# Patient Record
Sex: FEMALE | Race: WHITE | NOT HISPANIC OR LATINO | Employment: OTHER | URBAN - METROPOLITAN AREA
[De-identification: names, ages, dates, MRNs, and addresses within clinical notes are randomized per-mention and may not be internally consistent; named-entity substitution may affect disease eponyms.]

---

## 2023-04-15 LAB
ALANINE AMINOTRANSFERASE (SGPT) (U/L) IN SER/PLAS: 15 U/L (ref 7–45)
ALBUMIN (G/DL) IN SER/PLAS: 4.1 G/DL (ref 3.4–5)
ALKALINE PHOSPHATASE (U/L) IN SER/PLAS: 111 U/L (ref 33–136)
ANION GAP IN SER/PLAS: 15 MMOL/L (ref 10–20)
ASPARTATE AMINOTRANSFERASE (SGOT) (U/L) IN SER/PLAS: 20 U/L (ref 9–39)
BILIRUBIN TOTAL (MG/DL) IN SER/PLAS: 0.6 MG/DL (ref 0–1.2)
CALCIDIOL (25 OH VITAMIN D3) (NG/ML) IN SER/PLAS: 17 NG/ML
CALCIUM (MG/DL) IN SER/PLAS: 9.8 MG/DL (ref 8.6–10.6)
CARBON DIOXIDE, TOTAL (MMOL/L) IN SER/PLAS: 25 MMOL/L (ref 21–32)
CHLORIDE (MMOL/L) IN SER/PLAS: 106 MMOL/L (ref 98–107)
COBALAMIN (VITAMIN B12) (PG/ML) IN SER/PLAS: 496 PG/ML (ref 211–911)
CREATININE (MG/DL) IN SER/PLAS: 1.69 MG/DL (ref 0.5–1.05)
ERYTHROCYTE DISTRIBUTION WIDTH (RATIO) BY AUTOMATED COUNT: 12.5 % (ref 11.5–14.5)
ERYTHROCYTE MEAN CORPUSCULAR HEMOGLOBIN CONCENTRATION (G/DL) BY AUTOMATED: 31.8 G/DL (ref 32–36)
ERYTHROCYTE MEAN CORPUSCULAR VOLUME (FL) BY AUTOMATED COUNT: 99 FL (ref 80–100)
ERYTHROCYTES (10*6/UL) IN BLOOD BY AUTOMATED COUNT: 4.09 X10E12/L (ref 4–5.2)
ESTIMATED AVERAGE GLUCOSE FOR HBA1C: 171 MG/DL
GFR FEMALE: 28 ML/MIN/1.73M2
GLUCOSE (MG/DL) IN SER/PLAS: 141 MG/DL (ref 74–99)
HEMATOCRIT (%) IN BLOOD BY AUTOMATED COUNT: 40.6 % (ref 36–46)
HEMOGLOBIN (G/DL) IN BLOOD: 12.9 G/DL (ref 12–16)
HEMOGLOBIN A1C/HEMOGLOBIN TOTAL IN BLOOD: 7.6 %
LEUKOCYTES (10*3/UL) IN BLOOD BY AUTOMATED COUNT: 9.8 X10E9/L (ref 4.4–11.3)
NRBC (PER 100 WBCS) BY AUTOMATED COUNT: 0 /100 WBC (ref 0–0)
PLATELETS (10*3/UL) IN BLOOD AUTOMATED COUNT: 261 X10E9/L (ref 150–450)
POTASSIUM (MMOL/L) IN SER/PLAS: 5.1 MMOL/L (ref 3.5–5.3)
PROTEIN TOTAL: 6.7 G/DL (ref 6.4–8.2)
SODIUM (MMOL/L) IN SER/PLAS: 141 MMOL/L (ref 136–145)
THYROTROPIN (MIU/L) IN SER/PLAS BY DETECTION LIMIT <= 0.05 MIU/L: 1.72 MIU/L (ref 0.44–3.98)
UREA NITROGEN (MG/DL) IN SER/PLAS: 42 MG/DL (ref 6–23)

## 2023-04-21 ENCOUNTER — OFFICE VISIT (OUTPATIENT)
Dept: PRIMARY CARE | Facility: CLINIC | Age: 88
End: 2023-04-21
Payer: MEDICARE

## 2023-04-21 VITALS
SYSTOLIC BLOOD PRESSURE: 126 MMHG | WEIGHT: 142 LBS | BODY MASS INDEX: 24.24 KG/M2 | DIASTOLIC BLOOD PRESSURE: 68 MMHG | HEIGHT: 64 IN

## 2023-04-21 DIAGNOSIS — N18.9 ACUTE RENAL FAILURE SUPERIMPOSED ON CHRONIC KIDNEY DISEASE, UNSPECIFIED CKD STAGE, UNSPECIFIED ACUTE RENAL FAILURE TYPE: ICD-10-CM

## 2023-04-21 DIAGNOSIS — E78.5 HYPERLIPIDEMIA, UNSPECIFIED HYPERLIPIDEMIA TYPE: ICD-10-CM

## 2023-04-21 DIAGNOSIS — N17.9 ACUTE RENAL FAILURE SUPERIMPOSED ON CHRONIC KIDNEY DISEASE, UNSPECIFIED CKD STAGE, UNSPECIFIED ACUTE RENAL FAILURE TYPE: ICD-10-CM

## 2023-04-21 DIAGNOSIS — I10 PRIMARY HYPERTENSION: Primary | ICD-10-CM

## 2023-04-21 DIAGNOSIS — K21.9 GASTROESOPHAGEAL REFLUX DISEASE WITHOUT ESOPHAGITIS: ICD-10-CM

## 2023-04-21 DIAGNOSIS — E11.9 TYPE 2 DIABETES MELLITUS WITHOUT COMPLICATION, WITHOUT LONG-TERM CURRENT USE OF INSULIN (MULTI): ICD-10-CM

## 2023-04-21 PROBLEM — R26.81 UNSTEADY GAIT: Status: ACTIVE | Noted: 2023-04-21

## 2023-04-21 PROBLEM — K57.32 DIVERTICULITIS OF COLON: Status: ACTIVE | Noted: 2023-04-21

## 2023-04-21 PROBLEM — I83.93 VARICOSE VEINS OF LEGS: Status: ACTIVE | Noted: 2023-04-21

## 2023-04-21 PROBLEM — I26.99 PULMONARY EMBOLISM (MULTI): Status: ACTIVE | Noted: 2023-04-21

## 2023-04-21 PROBLEM — S91.214A: Status: ACTIVE | Noted: 2023-04-21

## 2023-04-21 PROBLEM — K80.50 CHOLEDOCHOLITHIASIS: Status: ACTIVE | Noted: 2023-04-21

## 2023-04-21 PROBLEM — H90.3 ASYMMETRICAL SENSORINEURAL HEARING LOSS: Status: ACTIVE | Noted: 2020-02-13

## 2023-04-21 PROBLEM — R09.89 CHRONIC SINUS COMPLAINTS: Status: ACTIVE | Noted: 2023-04-21

## 2023-04-21 PROBLEM — S09.93XA INJURY OF FACE: Status: ACTIVE | Noted: 2023-04-21

## 2023-04-21 PROBLEM — R42 VERTIGO: Status: ACTIVE | Noted: 2023-04-21

## 2023-04-21 PROBLEM — M79.605 BILATERAL LEG AND FOOT PAIN: Status: ACTIVE | Noted: 2023-04-21

## 2023-04-21 PROBLEM — L98.9 SKIN LESION: Status: ACTIVE | Noted: 2023-04-21

## 2023-04-21 PROBLEM — H90.3 SENSORINEURAL HEARING LOSS, BILATERAL: Status: ACTIVE | Noted: 2020-02-13

## 2023-04-21 PROBLEM — G89.29 CHRONIC BILATERAL LOW BACK PAIN WITH RIGHT-SIDED SCIATICA: Status: ACTIVE | Noted: 2023-04-13

## 2023-04-21 PROBLEM — G45.9 TIA (TRANSIENT ISCHEMIC ATTACK): Status: ACTIVE | Noted: 2023-04-21

## 2023-04-21 PROBLEM — R10.9 ABDOMINAL PAIN: Status: ACTIVE | Noted: 2023-04-21

## 2023-04-21 PROBLEM — R19.7 DIARRHEA: Status: ACTIVE | Noted: 2023-04-21

## 2023-04-21 PROBLEM — M25.521 RIGHT ELBOW PAIN: Status: ACTIVE | Noted: 2023-04-21

## 2023-04-21 PROBLEM — H52.13 BILATERAL MYOPIA: Status: ACTIVE | Noted: 2023-04-21

## 2023-04-21 PROBLEM — M51.16 DISPLACEMENT OF LUMBAR INTERVERTEBRAL DISC WITH RADICULOPATHY: Status: ACTIVE | Noted: 2023-03-15

## 2023-04-21 PROBLEM — M54.32 SCIATICA OF LEFT SIDE: Status: ACTIVE | Noted: 2023-04-21

## 2023-04-21 PROBLEM — N20.0 KIDNEY STONE: Status: ACTIVE | Noted: 2023-04-21

## 2023-04-21 PROBLEM — R74.8 LIVER ENZYME ELEVATION: Status: ACTIVE | Noted: 2023-04-21

## 2023-04-21 PROBLEM — F41.9 ANXIETY: Status: ACTIVE | Noted: 2023-04-21

## 2023-04-21 PROBLEM — R19.5 LOOSE STOOLS: Status: ACTIVE | Noted: 2023-04-21

## 2023-04-21 PROBLEM — M25.569 KNEE PAIN: Status: ACTIVE | Noted: 2023-04-21

## 2023-04-21 PROBLEM — G57.60 LESION OF PLANTAR NERVE: Status: ACTIVE | Noted: 2023-04-21

## 2023-04-21 PROBLEM — F32.A DEPRESSION: Status: ACTIVE | Noted: 2023-04-21

## 2023-04-21 PROBLEM — I25.10 ARTERIOSCLEROTIC CARDIOVASCULAR DISEASE: Status: ACTIVE | Noted: 2023-04-21

## 2023-04-21 PROBLEM — L08.9 LOCAL INFECTION OF SKIN AND SUBCUTANEOUS TISSUE: Status: ACTIVE | Noted: 2023-04-21

## 2023-04-21 PROBLEM — M54.41 CHRONIC BILATERAL LOW BACK PAIN WITH RIGHT-SIDED SCIATICA: Status: ACTIVE | Noted: 2023-04-13

## 2023-04-21 PROBLEM — M79.672 BILATERAL LEG AND FOOT PAIN: Status: ACTIVE | Noted: 2023-04-21

## 2023-04-21 PROBLEM — E11.42 DIABETIC PERIPHERAL NEUROPATHY (MULTI): Status: ACTIVE | Noted: 2023-04-21

## 2023-04-21 PROBLEM — R35.0 INCREASED FREQUENCY OF URINATION: Status: ACTIVE | Noted: 2023-04-21

## 2023-04-21 PROBLEM — B35.1 ONYCHOMYCOSIS: Status: ACTIVE | Noted: 2023-04-21

## 2023-04-21 PROBLEM — H43.399 VITREOUS FLOATERS: Status: ACTIVE | Noted: 2023-04-21

## 2023-04-21 PROBLEM — H69.93 DYSFUNCTION OF BOTH EUSTACHIAN TUBES: Status: ACTIVE | Noted: 2020-02-13

## 2023-04-21 PROBLEM — Q45.3 CONGENITAL ANOMALY OF PANCREAS: Status: ACTIVE | Noted: 2023-04-21

## 2023-04-21 PROBLEM — R07.9 CHEST PAIN: Status: ACTIVE | Noted: 2023-04-21

## 2023-04-21 PROBLEM — J01.90 ACUTE SINUSITIS: Status: ACTIVE | Noted: 2023-04-21

## 2023-04-21 PROBLEM — R11.2 NAUSEA AND VOMITING: Status: ACTIVE | Noted: 2023-04-21

## 2023-04-21 PROBLEM — J32.9 SINUSITIS: Status: ACTIVE | Noted: 2023-04-21

## 2023-04-21 PROBLEM — Z96.1 PRESENCE OF INTRAOCULAR LENS: Status: ACTIVE | Noted: 2023-04-21

## 2023-04-21 PROBLEM — E78.00 HYPERCHOLESTEROLEMIA: Status: ACTIVE | Noted: 2023-04-21

## 2023-04-21 PROBLEM — Z97.3 WEARS EYEGLASSES: Status: ACTIVE | Noted: 2023-04-21

## 2023-04-21 PROBLEM — M21.6X1 PLANTAR FAT PAD ATROPHY OF RIGHT FOOT: Status: ACTIVE | Noted: 2023-04-21

## 2023-04-21 PROBLEM — M79.604 BILATERAL LEG AND FOOT PAIN: Status: ACTIVE | Noted: 2023-04-21

## 2023-04-21 PROBLEM — T14.8XXA WOUND OF SKIN: Status: ACTIVE | Noted: 2023-04-21

## 2023-04-21 PROBLEM — H93.13 BILATERAL TINNITUS: Status: ACTIVE | Noted: 2020-02-13

## 2023-04-21 PROBLEM — H26.499 PCO (POSTERIOR CAPSULAR OPACIFICATION): Status: ACTIVE | Noted: 2023-04-21

## 2023-04-21 PROBLEM — I72.8 SPLENIC ARTERY ANEURYSM (CMS-HCC): Status: ACTIVE | Noted: 2023-04-21

## 2023-04-21 PROBLEM — H52.203 ASTIGMATISM OF BOTH EYES: Status: ACTIVE | Noted: 2023-04-21

## 2023-04-21 PROBLEM — Z98.890 HISTORY OF YAG LASER IRIDOTOMY: Status: ACTIVE | Noted: 2023-04-21

## 2023-04-21 PROBLEM — R05.9 COUGH: Status: ACTIVE | Noted: 2023-04-21

## 2023-04-21 PROBLEM — J30.9 ALLERGIC RHINITIS: Status: ACTIVE | Noted: 2020-02-13

## 2023-04-21 PROBLEM — B02.9 SHINGLES: Status: ACTIVE | Noted: 2023-04-21

## 2023-04-21 PROBLEM — H53.002 AMBLYOPIA OF LEFT EYE: Status: ACTIVE | Noted: 2023-04-21

## 2023-04-21 PROBLEM — A04.72 CLOSTRIDIUM DIFFICILE COLITIS: Status: ACTIVE | Noted: 2023-04-21

## 2023-04-21 PROBLEM — M54.9 BACK PAIN: Status: ACTIVE | Noted: 2023-04-21

## 2023-04-21 PROBLEM — G47.00 INSOMNIA: Status: ACTIVE | Noted: 2023-04-21

## 2023-04-21 PROBLEM — K31.84 GASTROPARESIS: Status: ACTIVE | Noted: 2023-04-21

## 2023-04-21 PROBLEM — H35.3131 EARLY DRY STAGE NONEXUDATIVE AGE-RELATED MACULAR DEGENERATION OF BOTH EYES: Status: ACTIVE | Noted: 2023-04-21

## 2023-04-21 PROBLEM — K59.00 CONSTIPATION: Status: ACTIVE | Noted: 2023-04-21

## 2023-04-21 PROBLEM — M48.062 SPINAL STENOSIS, LUMBAR REGION, WITH NEUROGENIC CLAUDICATION: Status: ACTIVE | Noted: 2023-03-15

## 2023-04-21 PROBLEM — R60.0 EDEMA, LEG: Status: ACTIVE | Noted: 2023-04-21

## 2023-04-21 PROBLEM — M79.671 BILATERAL LEG AND FOOT PAIN: Status: ACTIVE | Noted: 2023-04-21

## 2023-04-21 PROBLEM — H53.009 AMBLYOPIA: Status: ACTIVE | Noted: 2023-04-21

## 2023-04-21 PROBLEM — H35.30 AMD (AGE RELATED MACULAR DEGENERATION): Status: ACTIVE | Noted: 2023-04-21

## 2023-04-21 PROBLEM — I73.9 CLAUDICATION (CMS-HCC): Status: ACTIVE | Noted: 2023-04-21

## 2023-04-21 PROBLEM — E87.1 HYPONATREMIA: Status: ACTIVE | Noted: 2023-04-21

## 2023-04-21 PROCEDURE — 3074F SYST BP LT 130 MM HG: CPT | Performed by: INTERNAL MEDICINE

## 2023-04-21 PROCEDURE — 1159F MED LIST DOCD IN RCRD: CPT | Performed by: INTERNAL MEDICINE

## 2023-04-21 PROCEDURE — 99214 OFFICE O/P EST MOD 30 MIN: CPT | Performed by: INTERNAL MEDICINE

## 2023-04-21 PROCEDURE — 3078F DIAST BP <80 MM HG: CPT | Performed by: INTERNAL MEDICINE

## 2023-04-21 RX ORDER — CLOPIDOGREL BISULFATE 75 MG/1
75 TABLET ORAL DAILY
Status: ON HOLD | COMMUNITY
Start: 2020-01-21 | End: 2024-03-04

## 2023-04-21 RX ORDER — CYCLOBENZAPRINE HCL 10 MG
10 TABLET ORAL 2 TIMES DAILY PRN
COMMUNITY
Start: 2023-02-21 | End: 2024-03-08 | Stop reason: HOSPADM

## 2023-04-21 RX ORDER — PANTOPRAZOLE SODIUM 40 MG/1
40 TABLET, DELAYED RELEASE ORAL
Qty: 90 TABLET | Refills: 1 | Status: SHIPPED | OUTPATIENT
Start: 2023-04-21 | End: 2023-10-23 | Stop reason: SDUPTHER

## 2023-04-21 RX ORDER — ATORVASTATIN CALCIUM 40 MG/1
40 TABLET, FILM COATED ORAL DAILY
COMMUNITY
Start: 2012-08-06 | End: 2023-10-20 | Stop reason: WASHOUT

## 2023-04-21 RX ORDER — CHOLECALCIFEROL (VITAMIN D3) 25 MCG
TABLET ORAL
COMMUNITY
End: 2023-10-20 | Stop reason: WASHOUT

## 2023-04-21 RX ORDER — LANCETS
EACH MISCELLANEOUS
Qty: 100 EACH | Refills: 3 | Status: SHIPPED | OUTPATIENT
Start: 2023-04-21 | End: 2023-07-13 | Stop reason: SDUPTHER

## 2023-04-21 RX ORDER — GABAPENTIN 300 MG/1
300 CAPSULE ORAL 2 TIMES DAILY
COMMUNITY

## 2023-04-21 RX ORDER — BLOOD SUGAR DIAGNOSTIC
STRIP MISCELLANEOUS
COMMUNITY
End: 2024-03-08 | Stop reason: HOSPADM

## 2023-04-21 RX ORDER — FLUTICASONE PROPIONATE 50 MCG
2 SPRAY, SUSPENSION (ML) NASAL DAILY
COMMUNITY
Start: 2017-08-18

## 2023-04-21 RX ORDER — URSODIOL 300 MG/1
300 CAPSULE ORAL 2 TIMES DAILY
COMMUNITY
Start: 2015-09-16 | End: 2023-07-13 | Stop reason: SDUPTHER

## 2023-04-21 RX ORDER — ALPRAZOLAM 0.25 MG/1
TABLET ORAL
COMMUNITY
End: 2023-10-20 | Stop reason: WASHOUT

## 2023-04-21 RX ORDER — GLIPIZIDE 5 MG/1
5 TABLET ORAL
Qty: 180 TABLET | Refills: 1 | Status: SHIPPED
Start: 2023-04-21 | End: 2023-10-20 | Stop reason: WASHOUT

## 2023-04-21 RX ORDER — METOPROLOL SUCCINATE 100 MG/1
100 TABLET, EXTENDED RELEASE ORAL 2 TIMES DAILY
COMMUNITY
Start: 2018-09-27

## 2023-04-21 RX ORDER — GLIPIZIDE 5 MG/1
5 TABLET ORAL DAILY
COMMUNITY
Start: 2013-04-01 | End: 2023-04-21 | Stop reason: SDUPTHER

## 2023-04-21 RX ORDER — PANTOPRAZOLE SODIUM 40 MG/1
40 TABLET, DELAYED RELEASE ORAL
COMMUNITY
Start: 2019-10-29 | End: 2023-04-21 | Stop reason: SDUPTHER

## 2023-04-21 RX ORDER — LOSARTAN POTASSIUM 100 MG/1
100 TABLET ORAL DAILY
COMMUNITY
Start: 2019-10-28

## 2023-04-21 RX ORDER — AZELASTINE 1 MG/ML
2 SPRAY, METERED NASAL 2 TIMES DAILY
COMMUNITY

## 2023-04-21 RX ORDER — AMLODIPINE BESYLATE 10 MG/1
10 TABLET ORAL DAILY
COMMUNITY
Start: 2015-08-18

## 2023-04-21 NOTE — PROGRESS NOTES
Subjective   Patient ID: Catie Groves is a 89 y.o. female who presents for Follow-up and Med Refill.    Med Refill       Patient is here for follow-up  Follow-up on coronary artery disease hypertension high cholesterol anxiety  Did blood work  Needs medications  Patient got II nerve blocks back pain is doing little better but she has really suffered from the back pain.  Steroid only helped temporarily        past recap  Blood pressure  Needs medication refill  Shingles pain is doing better  Follow-up on coronary artery disease hypertension high cholesterol  Anxiety is doing better sometimes she takes tramadol  Wants nausea medication for occasional use  Wants handicap sticker  Shingles vaccine she is due  Overall doing well        Patient is complaining of having right upper quadrant pain and soreness having loose stools cholestyramine is not agreeing  She is worried that her GI issues are flaring up  She has history of common bile duct stones and irritable bowel disease  She also has history of diverticulitis C. difficile colitis     patient went to see the GI for the abdominal discomfort she was having she had MRI done CAT scans and ultrasound done everything came back normal  She did blood work for cholesterol diabetes  Here for follow-up and needs refills on medications  Overall she is doing great  Wants prescription for Zofran for occasional use      patient fell and broke her nose has a lot of bruises. On Wednesday she was walking in the parking lot to get groceries and she fell on her face  Now she is having pain in the right elbow she did not get x-ray of the elbow done concerned if she has broken the bone  She has sutures on the chin     Patient here for follow-up on hospital stay  Was admitted for TIA symptoms started on Plavix  Her blood pressure was very high last evening she is concerned if Plavix is making her blood pressure goal  She called me early morning hours around 5 spoke to her and after that  "she felt comfortable and slept  blood pressure is better now   She is also concerned about findings on the mastoid in MRI  Still feels sinuses to be congested  Review of Systems    Objective   /68   Ht 1.626 m (5' 4\")   Wt 64.4 kg (142 lb)   BMI 24.37 kg/m²     Physical Exam  Vitals reviewed.   Constitutional:       Appearance: Normal appearance.   HENT:      Head: Normocephalic and atraumatic.      Right Ear: Tympanic membrane, ear canal and external ear normal.      Left Ear: Tympanic membrane, ear canal and external ear normal.      Nose: Nose normal.      Mouth/Throat:      Pharynx: Oropharynx is clear.   Eyes:      Extraocular Movements: Extraocular movements intact.      Conjunctiva/sclera: Conjunctivae normal.      Pupils: Pupils are equal, round, and reactive to light.   Cardiovascular:      Rate and Rhythm: Normal rate and regular rhythm.      Pulses: Normal pulses.      Heart sounds: Normal heart sounds.   Pulmonary:      Effort: Pulmonary effort is normal.      Breath sounds: Normal breath sounds.   Abdominal:      General: Abdomen is flat. Bowel sounds are normal.      Palpations: Abdomen is soft.   Musculoskeletal:      Cervical back: Normal range of motion and neck supple.   Skin:     General: Skin is warm and dry.   Neurological:      General: No focal deficit present.      Mental Status: She is alert and oriented to person, place, and time.   Psychiatric:         Mood and Affect: Mood normal.         Assessment/Plan   Problem List Items Addressed This Visit          Circulatory    Hypertension - Primary    Relevant Orders    Basic Metabolic Panel    CBC    Comprehensive Metabolic Panel    Hemoglobin A1C    Lipid Panel    Vitamin D, Total       Digestive    Esophageal reflux    Relevant Medications    pantoprazole (ProtoNix) 40 mg EC tablet    Other Relevant Orders    Basic Metabolic Panel    CBC    Comprehensive Metabolic Panel    Hemoglobin A1C    Lipid Panel    Vitamin D, Total       " Endocrine/Metabolic    Diabetes mellitus (CMS/HCC)    Relevant Medications    glipiZIDE (Glucotrol) 5 mg tablet    lancets misc    Other Relevant Orders    Basic Metabolic Panel    CBC    Comprehensive Metabolic Panel    Hemoglobin A1C    Lipid Panel    Vitamin D, Total       Other    Hyperlipidemia    Relevant Orders    Basic Metabolic Panel    CBC    Comprehensive Metabolic Panel    Hemoglobin A1C    Lipid Panel    Vitamin D, Total     Other Visit Diagnoses       Acute renal failure superimposed on chronic kidney disease, unspecified CKD stage, unspecified acute renal failure type (CMS/[facility])        Relevant Orders    US renal complete    Basic Metabolic Panel    CBC    Comprehensive Metabolic Panel    Hemoglobin A1C    Lipid Panel    Vitamin D, Total    US bladder             past recap  Patient symptoms could be related to gastroparesis  Patient quit taking Reglan  We we will try Reglan again  Try sucralfate to help with the acid reflux  She needs Plavix for her coronary artery disease and I do not think symptoms are caused by Plavix  Increase fiber in the diet  Follow-up if not better     10/21/22     Blood work results reviewed  Cholesterol well controlled  Sugar well controlled  Blood pressure stable  Patient had follow-up with a cardiologist coronary artery disease stable  Advised patient to take Metamucil to help with the loose stool  Explained that not safe to give standing order for antibiotics  Refer to physical therapy for lower back pain and hip pain  Medications refilled  Follow-up in 6 months    4/21/2023  Blood pressure stable  Creatinine has gone up to 1.69 BUN 42  Encourage patient to drink more water  Cut down salt intake if not better will do ultrasound kidneys and recheck blood work in 3 months  A1c has gone up to 7.6 patient has been really well controlled on diabetes all along  Steroids must be doing it  We will recheck and see if needs medication adjustment  Patient wants to wait until next  blood work  Cholesterol is okay  Follow-up blood work in 3 months

## 2023-06-28 ENCOUNTER — LAB (OUTPATIENT)
Dept: LAB | Facility: LAB | Age: 88
End: 2023-06-28
Payer: MEDICARE

## 2023-06-28 DIAGNOSIS — N18.9 ACUTE RENAL FAILURE SUPERIMPOSED ON CHRONIC KIDNEY DISEASE, UNSPECIFIED CKD STAGE, UNSPECIFIED ACUTE RENAL FAILURE TYPE: ICD-10-CM

## 2023-06-28 DIAGNOSIS — E78.5 HYPERLIPIDEMIA, UNSPECIFIED HYPERLIPIDEMIA TYPE: ICD-10-CM

## 2023-06-28 DIAGNOSIS — K21.9 GASTROESOPHAGEAL REFLUX DISEASE WITHOUT ESOPHAGITIS: ICD-10-CM

## 2023-06-28 DIAGNOSIS — N17.9 ACUTE RENAL FAILURE SUPERIMPOSED ON CHRONIC KIDNEY DISEASE, UNSPECIFIED CKD STAGE, UNSPECIFIED ACUTE RENAL FAILURE TYPE: ICD-10-CM

## 2023-06-28 DIAGNOSIS — E11.9 TYPE 2 DIABETES MELLITUS WITHOUT COMPLICATION, WITHOUT LONG-TERM CURRENT USE OF INSULIN (MULTI): ICD-10-CM

## 2023-06-28 DIAGNOSIS — I10 PRIMARY HYPERTENSION: ICD-10-CM

## 2023-06-28 LAB
CALCIDIOL (25 OH VITAMIN D3) (NG/ML) IN SER/PLAS: 25 NG/ML
ERYTHROCYTE DISTRIBUTION WIDTH (RATIO) BY AUTOMATED COUNT: 11.7 % (ref 11.5–14.5)
ERYTHROCYTE MEAN CORPUSCULAR HEMOGLOBIN CONCENTRATION (G/DL) BY AUTOMATED: 32.3 G/DL (ref 32–36)
ERYTHROCYTE MEAN CORPUSCULAR VOLUME (FL) BY AUTOMATED COUNT: 99 FL (ref 80–100)
ERYTHROCYTES (10*6/UL) IN BLOOD BY AUTOMATED COUNT: 3.94 X10E12/L (ref 4–5.2)
ESTIMATED AVERAGE GLUCOSE FOR HBA1C: 137 MG/DL
HEMATOCRIT (%) IN BLOOD BY AUTOMATED COUNT: 39 % (ref 36–46)
HEMOGLOBIN (G/DL) IN BLOOD: 12.6 G/DL (ref 12–16)
HEMOGLOBIN A1C/HEMOGLOBIN TOTAL IN BLOOD: 6.4 %
LEUKOCYTES (10*3/UL) IN BLOOD BY AUTOMATED COUNT: 8.6 X10E9/L (ref 4.4–11.3)
NRBC (PER 100 WBCS) BY AUTOMATED COUNT: 0 /100 WBC (ref 0–0)
PLATELETS (10*3/UL) IN BLOOD AUTOMATED COUNT: 273 X10E9/L (ref 150–450)

## 2023-06-28 PROCEDURE — 36415 COLL VENOUS BLD VENIPUNCTURE: CPT

## 2023-06-28 PROCEDURE — 85027 COMPLETE CBC AUTOMATED: CPT

## 2023-06-28 PROCEDURE — 80053 COMPREHEN METABOLIC PANEL: CPT

## 2023-06-28 PROCEDURE — 83036 HEMOGLOBIN GLYCOSYLATED A1C: CPT

## 2023-06-28 PROCEDURE — 82306 VITAMIN D 25 HYDROXY: CPT

## 2023-06-28 PROCEDURE — 80061 LIPID PANEL: CPT

## 2023-06-29 LAB
ALANINE AMINOTRANSFERASE (SGPT) (U/L) IN SER/PLAS: 12 U/L (ref 7–45)
ALBUMIN (G/DL) IN SER/PLAS: 4.2 G/DL (ref 3.4–5)
ALKALINE PHOSPHATASE (U/L) IN SER/PLAS: 108 U/L (ref 33–136)
ANION GAP IN SER/PLAS: 16 MMOL/L (ref 10–20)
ASPARTATE AMINOTRANSFERASE (SGOT) (U/L) IN SER/PLAS: 19 U/L (ref 9–39)
BILIRUBIN TOTAL (MG/DL) IN SER/PLAS: 0.5 MG/DL (ref 0–1.2)
CALCIUM (MG/DL) IN SER/PLAS: 10 MG/DL (ref 8.6–10.6)
CARBON DIOXIDE, TOTAL (MMOL/L) IN SER/PLAS: 22 MMOL/L (ref 21–32)
CHLORIDE (MMOL/L) IN SER/PLAS: 107 MMOL/L (ref 98–107)
CHOLESTEROL (MG/DL) IN SER/PLAS: 145 MG/DL (ref 0–199)
CHOLESTEROL IN HDL (MG/DL) IN SER/PLAS: 45.5 MG/DL
CHOLESTEROL/HDL RATIO: 3.2
CREATININE (MG/DL) IN SER/PLAS: 1.93 MG/DL (ref 0.5–1.05)
GFR FEMALE: 24 ML/MIN/1.73M2
GLUCOSE (MG/DL) IN SER/PLAS: 142 MG/DL (ref 74–99)
LDL: 70 MG/DL (ref 0–99)
POTASSIUM (MMOL/L) IN SER/PLAS: 5.4 MMOL/L (ref 3.5–5.3)
PROTEIN TOTAL: 6.8 G/DL (ref 6.4–8.2)
SODIUM (MMOL/L) IN SER/PLAS: 140 MMOL/L (ref 136–145)
TRIGLYCERIDE (MG/DL) IN SER/PLAS: 150 MG/DL (ref 0–149)
UREA NITROGEN (MG/DL) IN SER/PLAS: 38 MG/DL (ref 6–23)
VLDL: 30 MG/DL (ref 0–40)

## 2023-07-13 ENCOUNTER — TELEPHONE (OUTPATIENT)
Dept: PRIMARY CARE | Facility: CLINIC | Age: 88
End: 2023-07-13
Payer: MEDICARE

## 2023-07-13 DIAGNOSIS — E11.9 TYPE 2 DIABETES MELLITUS WITHOUT COMPLICATION, WITHOUT LONG-TERM CURRENT USE OF INSULIN (MULTI): ICD-10-CM

## 2023-07-13 DIAGNOSIS — K80.50 CHOLEDOCHOLITHIASIS: ICD-10-CM

## 2023-07-13 RX ORDER — LANCETS
EACH MISCELLANEOUS
Qty: 100 EACH | Refills: 3 | Status: SHIPPED
Start: 2023-07-13 | End: 2023-07-14

## 2023-07-13 RX ORDER — URSODIOL 300 MG/1
300 CAPSULE ORAL 2 TIMES DAILY
Qty: 180 CAPSULE | Refills: 0 | Status: SHIPPED | OUTPATIENT
Start: 2023-07-13 | End: 2023-10-11

## 2023-07-14 RX ORDER — LANCETS
EACH MISCELLANEOUS
Qty: 100 EACH | Refills: 3 | Status: SHIPPED | OUTPATIENT
Start: 2023-07-14 | End: 2024-03-29 | Stop reason: SDUPTHER

## 2023-07-28 PROBLEM — M54.50 LOWER BACK PAIN: Status: ACTIVE | Noted: 2023-07-28

## 2023-07-28 PROBLEM — E11.9 NON-INSULIN DEPENDENT TYPE 2 DIABETES MELLITUS (MULTI): Status: ACTIVE | Noted: 2023-07-28

## 2023-07-28 PROBLEM — K21.00 REFLUX ESOPHAGITIS: Status: ACTIVE | Noted: 2023-07-28

## 2023-07-28 PROBLEM — M54.16 LUMBAR RADICULOPATHY: Status: ACTIVE | Noted: 2023-07-28

## 2023-07-28 PROBLEM — M79.606 LOWER EXTREMITY PAIN: Status: ACTIVE | Noted: 2023-07-28

## 2023-07-28 PROBLEM — J30.2 SEASONAL ALLERGIC RHINITIS: Status: ACTIVE | Noted: 2020-02-13

## 2023-07-28 PROBLEM — E55.9 VITAMIN D DEFICIENCY: Status: ACTIVE | Noted: 2023-07-28

## 2023-07-28 RX ORDER — NAPROXEN SODIUM 220 MG/1
TABLET, FILM COATED ORAL EVERY 24 HOURS
COMMUNITY
End: 2023-10-20 | Stop reason: WASHOUT

## 2023-07-31 ENCOUNTER — OFFICE VISIT (OUTPATIENT)
Dept: PRIMARY CARE | Facility: CLINIC | Age: 88
End: 2023-07-31
Payer: MEDICARE

## 2023-07-31 VITALS
HEIGHT: 64 IN | SYSTOLIC BLOOD PRESSURE: 136 MMHG | BODY MASS INDEX: 23.39 KG/M2 | DIASTOLIC BLOOD PRESSURE: 60 MMHG | WEIGHT: 137 LBS

## 2023-07-31 DIAGNOSIS — E78.5 HYPERLIPIDEMIA, UNSPECIFIED HYPERLIPIDEMIA TYPE: ICD-10-CM

## 2023-07-31 DIAGNOSIS — N39.0 URINARY TRACT INFECTION WITHOUT HEMATURIA, SITE UNSPECIFIED: ICD-10-CM

## 2023-07-31 DIAGNOSIS — E11.9 TYPE 2 DIABETES MELLITUS WITHOUT COMPLICATION, WITHOUT LONG-TERM CURRENT USE OF INSULIN (MULTI): ICD-10-CM

## 2023-07-31 DIAGNOSIS — I10 PRIMARY HYPERTENSION: ICD-10-CM

## 2023-07-31 DIAGNOSIS — R10.9 ABDOMINAL PAIN, UNSPECIFIED ABDOMINAL LOCATION: ICD-10-CM

## 2023-07-31 PROCEDURE — 1036F TOBACCO NON-USER: CPT | Performed by: INTERNAL MEDICINE

## 2023-07-31 PROCEDURE — 3075F SYST BP GE 130 - 139MM HG: CPT | Performed by: INTERNAL MEDICINE

## 2023-07-31 PROCEDURE — 99214 OFFICE O/P EST MOD 30 MIN: CPT | Performed by: INTERNAL MEDICINE

## 2023-07-31 PROCEDURE — 1159F MED LIST DOCD IN RCRD: CPT | Performed by: INTERNAL MEDICINE

## 2023-07-31 PROCEDURE — 1126F AMNT PAIN NOTED NONE PRSNT: CPT | Performed by: INTERNAL MEDICINE

## 2023-07-31 PROCEDURE — 3078F DIAST BP <80 MM HG: CPT | Performed by: INTERNAL MEDICINE

## 2023-07-31 RX ORDER — METHYLPREDNISOLONE 4 MG/1
TABLET ORAL
Qty: 21 TABLET | Refills: 0 | Status: SHIPPED | OUTPATIENT
Start: 2023-07-31 | End: 2024-02-14 | Stop reason: SDUPTHER

## 2023-07-31 RX ORDER — LACTULOSE 10 G/15ML
30 SOLUTION ORAL DAILY
Qty: 1350 ML | Refills: 0 | Status: SHIPPED
Start: 2023-07-31 | End: 2024-03-08 | Stop reason: HOSPADM

## 2023-07-31 ASSESSMENT — ENCOUNTER SYMPTOMS
OCCASIONAL FEELINGS OF UNSTEADINESS: 0
DEPRESSION: 0
LOSS OF SENSATION IN FEET: 0

## 2023-08-05 ENCOUNTER — LAB (OUTPATIENT)
Dept: LAB | Facility: LAB | Age: 88
End: 2023-08-05
Payer: MEDICARE

## 2023-08-05 DIAGNOSIS — N39.0 URINARY TRACT INFECTION WITHOUT HEMATURIA, SITE UNSPECIFIED: ICD-10-CM

## 2023-08-05 LAB
APPEARANCE, URINE: CLEAR
BILIRUBIN, URINE: NEGATIVE
BLOOD, URINE: NEGATIVE
COLOR, URINE: COLORLESS
GLUCOSE, URINE: NEGATIVE MG/DL
KETONES, URINE: NEGATIVE MG/DL
LEUKOCYTE ESTERASE, URINE: NEGATIVE
NITRITE, URINE: NEGATIVE
PH, URINE: 6 (ref 5–8)
PROTEIN, URINE: NEGATIVE MG/DL
SPECIFIC GRAVITY, URINE: 1 (ref 1–1.03)
UROBILINOGEN, URINE: <2 MG/DL (ref 0–1.9)

## 2023-08-05 PROCEDURE — 81003 URINALYSIS AUTO W/O SCOPE: CPT

## 2023-08-05 PROCEDURE — 87086 URINE CULTURE/COLONY COUNT: CPT

## 2023-08-06 LAB — URINE CULTURE: NORMAL

## 2023-08-07 ENCOUNTER — OFFICE VISIT (OUTPATIENT)
Dept: PRIMARY CARE | Facility: CLINIC | Age: 88
End: 2023-08-07
Payer: MEDICARE

## 2023-08-07 VITALS — DIASTOLIC BLOOD PRESSURE: 66 MMHG | SYSTOLIC BLOOD PRESSURE: 130 MMHG

## 2023-08-07 DIAGNOSIS — R10.9 ABDOMINAL PAIN, UNSPECIFIED ABDOMINAL LOCATION: Primary | ICD-10-CM

## 2023-08-07 DIAGNOSIS — R14.0 ABDOMINAL DISTENSION: ICD-10-CM

## 2023-08-07 PROCEDURE — 1126F AMNT PAIN NOTED NONE PRSNT: CPT | Performed by: INTERNAL MEDICINE

## 2023-08-07 PROCEDURE — 3078F DIAST BP <80 MM HG: CPT | Performed by: INTERNAL MEDICINE

## 2023-08-07 PROCEDURE — 99213 OFFICE O/P EST LOW 20 MIN: CPT | Performed by: INTERNAL MEDICINE

## 2023-08-07 PROCEDURE — 3075F SYST BP GE 130 - 139MM HG: CPT | Performed by: INTERNAL MEDICINE

## 2023-08-07 PROCEDURE — 1159F MED LIST DOCD IN RCRD: CPT | Performed by: INTERNAL MEDICINE

## 2023-08-07 PROCEDURE — 1036F TOBACCO NON-USER: CPT | Performed by: INTERNAL MEDICINE

## 2023-08-07 ASSESSMENT — ENCOUNTER SYMPTOMS
ABDOMINAL PAIN: 1
BACK PAIN: 1

## 2023-08-08 NOTE — PROGRESS NOTES
Subjective   Patient ID: Catie Groves is a 89 y.o. female who presents for Follow-up and Med Refill.    Med Refill    Patient is here with complaints of having abdominal pain.  Last Wednesday went to the ER urine grew Klebsiella pneumonia treated for UTI  2 weeks ago was very constipated stomach was hurting all over no still gets distended and not having good bowel movement  She is on gabapentin by Dr. quesada  Her back also hurts quite a bit     patient is here for follow-up  Follow-up on coronary artery disease hypertension high cholesterol anxiety  Did blood work  Needs medications  Patient got II nerve blocks back pain is doing little better but she has really suffered from the back pain.  Steroid only helped temporarily         past recap  Blood pressure  Needs medication refill  Shingles pain is doing better  Follow-up on coronary artery disease hypertension high cholesterol  Anxiety is doing better sometimes she takes tramadol  Wants nausea medication for occasional use  Wants handicap sticker  Shingles vaccine she is due  Overall doing well        Patient is complaining of having right upper quadrant pain and soreness having loose stools cholestyramine is not agreeing  She is worried that her GI issues are flaring up  She has history of common bile duct stones and irritable bowel disease  She also has history of diverticulitis C. difficile colitis     patient went to see the GI for the abdominal discomfort she was having she had MRI done CAT scans and ultrasound done everything came back normal  She did blood work for cholesterol diabetes  Here for follow-up and needs refills on medications  Overall she is doing great  Wants prescription for Zofran for occasional use      patient fell and broke her nose has a lot of bruises. On Wednesday she was walking in the parking lot to get groceries and she fell on her face  Now she is having pain in the right elbow she did not get x-ray of the elbow done concerned  "if she has broken the bone  She has sutures on the chin     Patient here for follow-up on hospital stay  Was admitted for TIA symptoms started on Plavix  Her blood pressure was very high last evening she is concerned if Plavix is making her blood pressure goal  She called me early morning hours around 5 spoke to her and after that she felt comfortable and slept  blood pressure is better now   She is also concerned about findings on the mastoid in MRI  Still feels sinuses to be congested    Review of Systems    Objective   /60   Ht 1.626 m (5' 4\")   Wt 62.1 kg (137 lb)   BMI 23.52 kg/m²     Physical Exam  Vitals reviewed.   Constitutional:       Appearance: Normal appearance.   HENT:      Head: Normocephalic and atraumatic.      Right Ear: Tympanic membrane, ear canal and external ear normal.      Left Ear: Tympanic membrane, ear canal and external ear normal.      Nose: Nose normal.      Mouth/Throat:      Pharynx: Oropharynx is clear.   Eyes:      Extraocular Movements: Extraocular movements intact.      Conjunctiva/sclera: Conjunctivae normal.      Pupils: Pupils are equal, round, and reactive to light.   Cardiovascular:      Rate and Rhythm: Normal rate and regular rhythm.      Pulses: Normal pulses.      Heart sounds: Normal heart sounds.   Pulmonary:      Effort: Pulmonary effort is normal.      Breath sounds: Normal breath sounds.   Abdominal:      General: Abdomen is flat. Bowel sounds are normal.      Palpations: Abdomen is soft.   Musculoskeletal:      Cervical back: Normal range of motion and neck supple.   Skin:     General: Skin is warm and dry.   Neurological:      General: No focal deficit present.      Mental Status: She is alert and oriented to person, place, and time.   Psychiatric:         Mood and Affect: Mood normal.         Assessment/Plan   Problem List Items Addressed This Visit          Cardiac and Vasculature    Hyperlipidemia    Relevant Orders    CBC    Comprehensive Metabolic " Panel    Lipid Panel    Hemoglobin A1C    Hypertension    Relevant Orders    CBC    Comprehensive Metabolic Panel    Lipid Panel    Hemoglobin A1C       Endocrine/Metabolic    Diabetes mellitus (CMS/HCC)    Relevant Orders    CBC    Comprehensive Metabolic Panel    Lipid Panel    Hemoglobin A1C       Gastrointestinal and Abdominal    Abdominal pain    Relevant Medications    methylPREDNISolone (Medrol Dospak) 4 mg tablets    lactulose 20 gram/30 mL oral solution     Other Visit Diagnoses       Urinary tract infection without hematuria, site unspecified        Relevant Orders    Urine Culture (Completed)    Urinalysis with Reflex Microscopic (Completed)           past recap  Patient symptoms could be related to gastroparesis  Patient quit taking Reglan  We we will try Reglan again  Try sucralfate to help with the acid reflux  She needs Plavix for her coronary artery disease and I do not think symptoms are caused by Plavix  Increase fiber in the diet  Follow-up if not better     10/21/22     Blood work results reviewed  Cholesterol well controlled  Sugar well controlled  Blood pressure stable  Patient had follow-up with a cardiologist coronary artery disease stable  Advised patient to take Metamucil to help with the loose stool  Explained that not safe to give standing order for antibiotics  Refer to physical therapy for lower back pain and hip pain  Medications refilled  Follow-up in 6 months     4/21/2023  Blood pressure stable  Creatinine has gone up to 1.69 BUN 42  Encourage patient to drink more water  Cut down salt intake if not better will do ultrasound kidneys and recheck blood work in 3 months  A1c has gone up to 7.6 patient has been really well controlled on diabetes all along  Steroids must be doing it  We will recheck and see if needs medication adjustment  Patient wants to wait until next blood work  Cholesterol is okay  Follow-up blood work in 3 months    7/31/2023  ER records reviewed  BUN 23 creatinine  1.5 potassium 5.1  Last blood work here showed elevated creatinine and potassium was high but at that time patient says she was getting nerve blocks which made her kidneys work well  We will check UA C&S  Try lactulose for constipation  Medrol Dosepak for the back pain  Follow-up if not better

## 2023-08-08 NOTE — PROGRESS NOTES
Subjective   Patient ID: Catie Groves is a 89 y.o. female who presents for Back Pain and Abdominal Pain.    Back Pain  Associated symptoms include abdominal pain.   Abdominal Pain    Patient is here because she is still having off-and-on abdominal pain and feels very distended not having good bowel movement in spite of taking lactulose      Patient is here with complaints of having abdominal pain.  Last Wednesday went to the ER urine grew Klebsiella pneumonia treated for UTI  2 weeks ago was very constipated stomach was hurting all over no still gets distended and not having good bowel movement  She is on gabapentin by Dr. quesada  Her back also hurts quite a bit      patient is here for follow-up  Follow-up on coronary artery disease hypertension high cholesterol anxiety  Did blood work  Needs medications  Patient got II nerve blocks back pain is doing little better but she has really suffered from the back pain.  Steroid only helped temporarily         past recap  Blood pressure  Needs medication refill  Shingles pain is doing better  Follow-up on coronary artery disease hypertension high cholesterol  Anxiety is doing better sometimes she takes tramadol  Wants nausea medication for occasional use  Wants handicap sticker  Shingles vaccine she is due  Overall doing well        Patient is complaining of having right upper quadrant pain and soreness having loose stools cholestyramine is not agreeing  She is worried that her GI issues are flaring up  She has history of common bile duct stones and irritable bowel disease  She also has history of diverticulitis C. difficile colitis     patient went to see the GI for the abdominal discomfort she was having she had MRI done CAT scans and ultrasound done everything came back normal  She did blood work for cholesterol diabetes  Here for follow-up and needs refills on medications  Overall she is doing great  Wants prescription for Zofran for occasional use      patient  fell and broke her nose has a lot of bruises. On Wednesday she was walking in the parking lot to get groceries and she fell on her face  Now she is having pain in the right elbow she did not get x-ray of the elbow done concerned if she has broken the bone  She has sutures on the chin     Patient here for follow-up on hospital stay  Was admitted for TIA symptoms started on Plavix  Her blood pressure was very high last evening she is concerned if Plavix is making her blood pressure goal  She called me early morning hours around 5 spoke to her and after that she felt comfortable and slept  blood pressure is better now   She is also concerned about findings on the mastoid in MRI  Still feels sinuses to be congested       Review of Systems   Gastrointestinal:  Positive for abdominal pain.   Musculoskeletal:  Positive for back pain.       Objective   /66     Physical Exam  Vitals reviewed.   Constitutional:       Appearance: Normal appearance.   HENT:      Head: Normocephalic and atraumatic.      Right Ear: Tympanic membrane, ear canal and external ear normal.      Left Ear: Tympanic membrane, ear canal and external ear normal.      Nose: Nose normal.      Mouth/Throat:      Pharynx: Oropharynx is clear.   Eyes:      Extraocular Movements: Extraocular movements intact.      Conjunctiva/sclera: Conjunctivae normal.      Pupils: Pupils are equal, round, and reactive to light.   Cardiovascular:      Rate and Rhythm: Normal rate and regular rhythm.      Pulses: Normal pulses.      Heart sounds: Normal heart sounds.   Pulmonary:      Effort: Pulmonary effort is normal.      Breath sounds: Normal breath sounds.   Abdominal:      General: Abdomen is flat. Bowel sounds are normal. There is distension.      Palpations: Abdomen is soft.   Musculoskeletal:      Cervical back: Normal range of motion and neck supple.   Skin:     General: Skin is warm and dry.   Neurological:      General: No focal deficit present.      Mental  Status: She is alert and oriented to person, place, and time.   Psychiatric:         Mood and Affect: Mood normal.         Assessment/Plan   Problem List Items Addressed This Visit          Gastrointestinal and Abdominal    Abdominal pain - Primary    Relevant Orders    CT abdomen pelvis wo IV contrast (Completed)     Other Visit Diagnoses       Abdominal distension        Relevant Orders    CT abdomen pelvis wo IV contrast (Completed)          past recap  Patient symptoms could be related to gastroparesis  Patient quit taking Reglan  We we will try Reglan again  Try sucralfate to help with the acid reflux  She needs Plavix for her coronary artery disease and I do not think symptoms are caused by Plavix  Increase fiber in the diet  Follow-up if not better     10/21/22     Blood work results reviewed  Cholesterol well controlled  Sugar well controlled  Blood pressure stable  Patient had follow-up with a cardiologist coronary artery disease stable  Advised patient to take Metamucil to help with the loose stool  Explained that not safe to give standing order for antibiotics  Refer to physical therapy for lower back pain and hip pain  Medications refilled  Follow-up in 6 months     4/21/2023  Blood pressure stable  Creatinine has gone up to 1.69 BUN 42  Encourage patient to drink more water  Cut down salt intake if not better will do ultrasound kidneys and recheck blood work in 3 months  A1c has gone up to 7.6 patient has been really well controlled on diabetes all along  Steroids must be doing it  We will recheck and see if needs medication adjustment  Patient wants to wait until next blood work  Cholesterol is okay  Follow-up blood work in 3 months     7/31/2023  ER records reviewed  BUN 23 creatinine 1.5 potassium 5.1  Last blood work here showed elevated creatinine and potassium was high but at that time patient says she was getting nerve blocks which made her kidneys work well  We will check UA C&S  Try lactulose  for constipation  Medrol Dosepak for the back pain  Follow-up if not better    8/7/2023  Patient has distended abdomen  Stat CT of the abdomen pelvis done without contrast because of compromised kidney function  No acute pathology found  Advised patient to take lactulose twice a day or 3 times a day to see if it helps to improve and regularize her bowel movements  Follow-up if not better

## 2023-08-24 ENCOUNTER — LAB (OUTPATIENT)
Dept: LAB | Facility: LAB | Age: 88
End: 2023-08-24
Payer: MEDICARE

## 2023-08-24 ENCOUNTER — HOSPITAL ENCOUNTER (OUTPATIENT)
Dept: DATA CONVERSION | Facility: HOSPITAL | Age: 88
Discharge: HOME | End: 2023-08-24

## 2023-08-24 ENCOUNTER — OFFICE VISIT (OUTPATIENT)
Dept: PRIMARY CARE | Facility: CLINIC | Age: 88
End: 2023-08-24
Payer: MEDICARE

## 2023-08-24 VITALS
WEIGHT: 133 LBS | HEIGHT: 64 IN | SYSTOLIC BLOOD PRESSURE: 138 MMHG | BODY MASS INDEX: 22.71 KG/M2 | DIASTOLIC BLOOD PRESSURE: 72 MMHG

## 2023-08-24 DIAGNOSIS — R10.9 ABDOMINAL PAIN, UNSPECIFIED ABDOMINAL LOCATION: Primary | ICD-10-CM

## 2023-08-24 DIAGNOSIS — R10.9 ABDOMINAL PAIN, UNSPECIFIED ABDOMINAL LOCATION: ICD-10-CM

## 2023-08-24 DIAGNOSIS — R10.9 UNSPECIFIED ABDOMINAL PAIN: ICD-10-CM

## 2023-08-24 LAB
ANION GAP IN SER/PLAS: 16 MMOL/L (ref 10–20)
CALCIUM (MG/DL) IN SER/PLAS: 10.3 MG/DL (ref 8.6–10.6)
CARBON DIOXIDE, TOTAL (MMOL/L) IN SER/PLAS: 21 MMOL/L (ref 21–32)
CHLORIDE (MMOL/L) IN SER/PLAS: 105 MMOL/L (ref 98–107)
CREATININE (MG/DL) IN SER/PLAS: 1.13 MG/DL (ref 0.5–1.05)
ERYTHROCYTE DISTRIBUTION WIDTH (RATIO) BY AUTOMATED COUNT: 11.9 % (ref 11.5–14.5)
ERYTHROCYTE MEAN CORPUSCULAR HEMOGLOBIN CONCENTRATION (G/DL) BY AUTOMATED: 31.6 G/DL (ref 32–36)
ERYTHROCYTE MEAN CORPUSCULAR VOLUME (FL) BY AUTOMATED COUNT: 98 FL (ref 80–100)
ERYTHROCYTES (10*6/UL) IN BLOOD BY AUTOMATED COUNT: 4.42 X10E12/L (ref 4–5.2)
GFR FEMALE: 45 ML/MIN/1.73M2
GLUCOSE (MG/DL) IN SER/PLAS: 133 MG/DL (ref 74–99)
HEMATOCRIT (%) IN BLOOD BY AUTOMATED COUNT: 43.4 % (ref 36–46)
HEMOGLOBIN (G/DL) IN BLOOD: 13.7 G/DL (ref 12–16)
LEUKOCYTES (10*3/UL) IN BLOOD BY AUTOMATED COUNT: 9.8 X10E9/L (ref 4.4–11.3)
NRBC (PER 100 WBCS) BY AUTOMATED COUNT: 0 /100 WBC (ref 0–0)
PLATELETS (10*3/UL) IN BLOOD AUTOMATED COUNT: 275 X10E9/L (ref 150–450)
POTASSIUM (MMOL/L) IN SER/PLAS: 4.4 MMOL/L (ref 3.5–5.3)
SODIUM (MMOL/L) IN SER/PLAS: 138 MMOL/L (ref 136–145)
UREA NITROGEN (MG/DL) IN SER/PLAS: 22 MG/DL (ref 6–23)

## 2023-08-24 PROCEDURE — 80048 BASIC METABOLIC PNL TOTAL CA: CPT

## 2023-08-24 PROCEDURE — 1126F AMNT PAIN NOTED NONE PRSNT: CPT | Performed by: INTERNAL MEDICINE

## 2023-08-24 PROCEDURE — 36415 COLL VENOUS BLD VENIPUNCTURE: CPT

## 2023-08-24 PROCEDURE — 3075F SYST BP GE 130 - 139MM HG: CPT | Performed by: INTERNAL MEDICINE

## 2023-08-24 PROCEDURE — 3078F DIAST BP <80 MM HG: CPT | Performed by: INTERNAL MEDICINE

## 2023-08-24 PROCEDURE — 99213 OFFICE O/P EST LOW 20 MIN: CPT | Performed by: INTERNAL MEDICINE

## 2023-08-24 PROCEDURE — 85027 COMPLETE CBC AUTOMATED: CPT

## 2023-08-24 PROCEDURE — 1036F TOBACCO NON-USER: CPT | Performed by: INTERNAL MEDICINE

## 2023-08-24 PROCEDURE — 1159F MED LIST DOCD IN RCRD: CPT | Performed by: INTERNAL MEDICINE

## 2023-08-24 RX ORDER — METRONIDAZOLE 500 MG/1
500 TABLET ORAL 3 TIMES DAILY
Qty: 30 TABLET | Refills: 0 | Status: SHIPPED | OUTPATIENT
Start: 2023-08-24 | End: 2023-09-03

## 2023-08-24 RX ORDER — CIPROFLOXACIN 500 MG/1
500 TABLET ORAL 2 TIMES DAILY
Qty: 20 TABLET | Refills: 0 | Status: SHIPPED | OUTPATIENT
Start: 2023-08-24 | End: 2023-09-03

## 2023-08-27 ASSESSMENT — ENCOUNTER SYMPTOMS: ABDOMINAL PAIN: 1

## 2023-08-27 NOTE — PROGRESS NOTES
Subjective   Patient ID: Catie Groves is a 89 y.o. female who presents for Abdominal Pain.    Abdominal Pain    Patient is here still having abdominal bloating still having left lower quadrant discomfort and very worried that something is wrong   She is drinking enough water  If you are in the morning but during the day does not pee as much     patient is here because she is still having off-and-on abdominal pain and feels very distended not having good bowel movement in spite of taking lactulose     Patient is here with complaints of having abdominal pain.  Last Wednesday went to the ER urine grew Klebsiella pneumonia treated for UTI  2 weeks ago was very constipated stomach was hurting all over no still gets distended and not having good bowel movement  She is on gabapentin by Dr. quesada  Her back also hurts quite a bit      patient is here for follow-up  Follow-up on coronary artery disease hypertension high cholesterol anxiety  Did blood work  Needs medications  Patient got II nerve blocks back pain is doing little better but she has really suffered from the back pain.  Steroid only helped temporarily      past recap  Blood pressure  Needs medication refill  Shingles pain is doing better  Follow-up on coronary artery disease hypertension high cholesterol  Anxiety is doing better sometimes she takes tramadol  Wants nausea medication for occasional use  Wants handicap sticker  Shingles vaccine she is due  Overall doing well        Patient is complaining of having right upper quadrant pain and soreness having loose stools cholestyramine is not agreeing  She is worried that her GI issues are flaring up  She has history of common bile duct stones and irritable bowel disease  She also has history of diverticulitis C. difficile colitis     patient went to see the GI for the abdominal discomfort she was having she had MRI done CAT scans and ultrasound done everything came back normal  She did blood work for  "cholesterol diabetes  Here for follow-up and needs refills on medications  Overall she is doing great  Wants prescription for Zofran for occasional use      patient fell and broke her nose has a lot of bruises. On Wednesday she was walking in the parking lot to get groceries and she fell on her face  Now she is having pain in the right elbow she did not get x-ray of the elbow done concerned if she has broken the bone  She has sutures on the chin     Patient here for follow-up on hospital stay  Was admitted for TIA symptoms started on Plavix  Her blood pressure was very high last evening she is concerned if Plavix is making her blood pressure goal  She called me early morning hours around 5 spoke to her and after that she felt comfortable and slept  blood pressure is better now   She is also concerned about findings on the mastoid in MRI  Still feels sinuses to be congested        Review of Systems   Gastrointestinal:  Positive for abdominal pain.   Musculoskeletal:  Positive for back pain.        Review of Systems   Gastrointestinal:  Positive for abdominal pain.       Objective   /72   Ht 1.626 m (5' 4\")   Wt 60.3 kg (133 lb)   BMI 22.83 kg/m²     Physical Exam  Vitals reviewed.   Constitutional:       Appearance: Normal appearance.   HENT:      Head: Normocephalic and atraumatic.      Right Ear: Tympanic membrane, ear canal and external ear normal.      Left Ear: Tympanic membrane, ear canal and external ear normal.      Nose: Nose normal.      Mouth/Throat:      Pharynx: Oropharynx is clear.   Eyes:      Extraocular Movements: Extraocular movements intact.      Conjunctiva/sclera: Conjunctivae normal.      Pupils: Pupils are equal, round, and reactive to light.   Cardiovascular:      Rate and Rhythm: Normal rate and regular rhythm.      Pulses: Normal pulses.      Heart sounds: Normal heart sounds.   Pulmonary:      Effort: Pulmonary effort is normal.      Breath sounds: Normal breath sounds. "   Abdominal:      General: Abdomen is flat. Bowel sounds are normal. There is distension.      Palpations: Abdomen is soft.   Musculoskeletal:      Cervical back: Normal range of motion and neck supple.   Skin:     General: Skin is warm and dry.   Neurological:      General: No focal deficit present.      Mental Status: She is alert and oriented to person, place, and time.   Psychiatric:         Mood and Affect: Mood normal.         Assessment/Plan   Problem List Items Addressed This Visit          Gastrointestinal and Abdominal    Abdominal pain - Primary    Relevant Medications    metroNIDAZOLE (Flagyl) 500 mg tablet    ciprofloxacin (Cipro) 500 mg tablet    Other Relevant Orders    CBC (Completed)    Basic Metabolic Panel (Completed)    US abdomen    Referral to Gastroenterology     past recap  Patient symptoms could be related to gastroparesis  Patient quit taking Reglan  We we will try Reglan again  Try sucralfate to help with the acid reflux  She needs Plavix for her coronary artery disease and I do not think symptoms are caused by Plavix  Increase fiber in the diet  Follow-up if not better     10/21/22     Blood work results reviewed  Cholesterol well controlled  Sugar well controlled  Blood pressure stable  Patient had follow-up with a cardiologist coronary artery disease stable  Advised patient to take Metamucil to help with the loose stool  Explained that not safe to give standing order for antibiotics  Refer to physical therapy for lower back pain and hip pain  Medications refilled  Follow-up in 6 months     4/21/2023  Blood pressure stable  Creatinine has gone up to 1.69 BUN 42  Encourage patient to drink more water  Cut down salt intake if not better will do ultrasound kidneys and recheck blood work in 3 months  A1c has gone up to 7.6 patient has been really well controlled on diabetes all along  Steroids must be doing it  We will recheck and see if needs medication adjustment  Patient wants to wait  until next blood work  Cholesterol is okay  Follow-up blood work in 3 months     7/31/2023  ER records reviewed  BUN 23 creatinine 1.5 potassium 5.1  Last blood work here showed elevated creatinine and potassium was high but at that time patient says she was getting nerve blocks which made her kidneys work well  We will check UA C&S  Try lactulose for constipation  Medrol Dosepak for the back pain  Follow-up if not better     8/7/2023  Patient has distended abdomen  Stat CT of the abdomen pelvis done without contrast because of compromised kidney function  No acute pathology found  Advised patient to take lactulose twice a day or 3 times a day to see if it helps to improve and regularize her bowel movements  Follow-up if not better            8/24/2023  All blood work has been negative  CAT scan has been negative  We will do the ultrasound of the abdomen to make sure there is no urinary retention causing the symptoms  Refer to GI if ultrasound is negative

## 2023-09-07 ENCOUNTER — HOSPITAL ENCOUNTER (OUTPATIENT)
Dept: DATA CONVERSION | Facility: HOSPITAL | Age: 88
Discharge: HOME | End: 2023-09-07
Payer: MEDICARE

## 2023-09-07 DIAGNOSIS — R14.0 ABDOMINAL DISTENSION (GASEOUS): ICD-10-CM

## 2023-09-21 PROBLEM — Z04.9 CONDITION NOT FOUND: Status: ACTIVE | Noted: 2023-09-21

## 2023-09-21 PROBLEM — S09.90XA INJURY OF HEAD: Status: ACTIVE | Noted: 2023-09-21

## 2023-09-21 PROBLEM — T14.8XXA CONTUSION: Status: ACTIVE | Noted: 2023-09-21

## 2023-09-21 PROBLEM — H92.09 OTALGIA: Status: ACTIVE | Noted: 2023-09-21
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
## 2023-09-21 PROBLEM — R00.2 PALPITATIONS: Status: ACTIVE | Noted: 2023-09-21

## 2023-09-21 PROBLEM — H53.9 ABNORMAL VISION: Status: ACTIVE | Noted: 2023-09-21

## 2023-09-21 PROBLEM — Z96.1 PSEUDOPHAKIA: Status: ACTIVE | Noted: 2023-09-21

## 2023-09-21 PROBLEM — Q45.3 PANCREAS DIVISUM: Status: ACTIVE | Noted: 2023-09-21

## 2023-09-21 PROBLEM — G50.1 ATYPICAL FACIAL PAIN: Status: ACTIVE | Noted: 2023-09-21

## 2023-09-21 RX ORDER — DOCUSATE SODIUM 100 MG/1
100 CAPSULE, LIQUID FILLED ORAL 2 TIMES DAILY
COMMUNITY

## 2023-09-21 RX ORDER — LOSARTAN POTASSIUM 25 MG/1
25 TABLET ORAL DAILY
Status: ON HOLD | COMMUNITY
End: 2024-03-04

## 2023-09-21 RX ORDER — ACETAMINOPHEN 500 MG
1 TABLET ORAL DAILY
COMMUNITY

## 2023-09-21 RX ORDER — NITROGLYCERIN 0.4 MG/1
0.4 TABLET SUBLINGUAL EVERY 5 MIN PRN
COMMUNITY

## 2023-09-21 RX ORDER — NAPROXEN SODIUM 220 MG/1
81 TABLET, FILM COATED ORAL DAILY
Status: ON HOLD | COMMUNITY
End: 2024-03-04

## 2023-09-21 RX ORDER — MIRTAZAPINE 7.5 MG/1
7.5 TABLET, FILM COATED ORAL EVERY EVENING
COMMUNITY
End: 2024-03-08 | Stop reason: HOSPADM

## 2023-09-21 RX ORDER — AMLODIPINE BESYLATE 5 MG/1
5 TABLET ORAL DAILY
COMMUNITY
End: 2023-10-20 | Stop reason: WASHOUT

## 2023-09-21 RX ORDER — ATENOLOL 100 MG/1
100 TABLET ORAL DAILY
COMMUNITY
End: 2024-03-08 | Stop reason: HOSPADM

## 2023-09-21 RX ORDER — GLIPIZIDE 5 MG/1
5 TABLET ORAL DAILY
COMMUNITY
End: 2023-10-23 | Stop reason: SDUPTHER

## 2023-09-21 RX ORDER — SIMVASTATIN 40 MG/1
40 TABLET, FILM COATED ORAL DAILY
COMMUNITY
End: 2024-03-08 | Stop reason: HOSPADM

## 2023-09-21 RX ORDER — MIRTAZAPINE 15 MG/1
15 TABLET, FILM COATED ORAL NIGHTLY
COMMUNITY
End: 2024-03-29 | Stop reason: SDUPTHER

## 2023-09-21 RX ORDER — ALPRAZOLAM 0.25 MG/1
0.25 TABLET ORAL 3 TIMES DAILY
COMMUNITY
End: 2024-03-08 | Stop reason: HOSPADM

## 2023-09-21 RX ORDER — METOCLOPRAMIDE 5 MG/1
5 TABLET ORAL 4 TIMES DAILY
COMMUNITY

## 2023-09-21 RX ORDER — ATORVASTATIN CALCIUM 40 MG/1
40 TABLET, FILM COATED ORAL DAILY
COMMUNITY

## 2023-09-21 RX ORDER — POLYETHYLENE GLYCOL 3350 17 G/17G
17 POWDER, FOR SOLUTION ORAL DAILY
COMMUNITY

## 2023-10-13 ENCOUNTER — APPOINTMENT (OUTPATIENT)
Dept: PRIMARY CARE | Facility: CLINIC | Age: 88
End: 2023-10-13
Payer: MEDICARE

## 2023-10-14 ENCOUNTER — LAB (OUTPATIENT)
Dept: LAB | Facility: LAB | Age: 88
End: 2023-10-14
Payer: MEDICARE

## 2023-10-14 DIAGNOSIS — E78.5 HYPERLIPIDEMIA, UNSPECIFIED HYPERLIPIDEMIA TYPE: ICD-10-CM

## 2023-10-14 DIAGNOSIS — I10 PRIMARY HYPERTENSION: ICD-10-CM

## 2023-10-14 DIAGNOSIS — E11.9 TYPE 2 DIABETES MELLITUS WITHOUT COMPLICATION, WITHOUT LONG-TERM CURRENT USE OF INSULIN (MULTI): ICD-10-CM

## 2023-10-14 LAB
ALBUMIN SERPL BCP-MCNC: 4.2 G/DL (ref 3.4–5)
ALP SERPL-CCNC: 125 U/L (ref 33–136)
ALT SERPL W P-5'-P-CCNC: 15 U/L (ref 7–45)
ANION GAP SERPL CALC-SCNC: 17 MMOL/L (ref 10–20)
AST SERPL W P-5'-P-CCNC: 22 U/L (ref 9–39)
BILIRUB SERPL-MCNC: 0.4 MG/DL (ref 0–1.2)
BUN SERPL-MCNC: 26 MG/DL (ref 6–23)
CALCIUM SERPL-MCNC: 9.8 MG/DL (ref 8.6–10.6)
CHLORIDE SERPL-SCNC: 106 MMOL/L (ref 98–107)
CHOLEST SERPL-MCNC: 168 MG/DL (ref 0–199)
CHOLESTEROL/HDL RATIO: 2.8
CO2 SERPL-SCNC: 23 MMOL/L (ref 21–32)
CREAT SERPL-MCNC: 1.27 MG/DL (ref 0.5–1.05)
ERYTHROCYTE [DISTWIDTH] IN BLOOD BY AUTOMATED COUNT: 12.6 % (ref 11.5–14.5)
EST. AVERAGE GLUCOSE BLD GHB EST-MCNC: 137 MG/DL
GFR SERPL CREATININE-BSD FRML MDRD: 39 ML/MIN/1.73M*2
GLUCOSE SERPL-MCNC: 103 MG/DL (ref 74–99)
HBA1C MFR BLD: 6.4 %
HCT VFR BLD AUTO: 40.7 % (ref 36–46)
HDLC SERPL-MCNC: 59.4 MG/DL
HGB BLD-MCNC: 12.7 G/DL (ref 12–16)
LDLC SERPL CALC-MCNC: 74 MG/DL
MCH RBC QN AUTO: 31.5 PG (ref 26–34)
MCHC RBC AUTO-ENTMCNC: 31.2 G/DL (ref 32–36)
MCV RBC AUTO: 101 FL (ref 80–100)
NON HDL CHOLESTEROL: 109 MG/DL (ref 0–149)
NRBC BLD-RTO: 0 /100 WBCS (ref 0–0)
PLATELET # BLD AUTO: 311 X10*3/UL (ref 150–450)
PMV BLD AUTO: 10 FL (ref 7.5–11.5)
POTASSIUM SERPL-SCNC: 4.5 MMOL/L (ref 3.5–5.3)
PROT SERPL-MCNC: 6.7 G/DL (ref 6.4–8.2)
RBC # BLD AUTO: 4.03 X10*6/UL (ref 4–5.2)
SODIUM SERPL-SCNC: 141 MMOL/L (ref 136–145)
TRIGL SERPL-MCNC: 171 MG/DL (ref 0–149)
VLDL: 34 MG/DL (ref 0–40)
WBC # BLD AUTO: 8.4 X10*3/UL (ref 4.4–11.3)

## 2023-10-14 PROCEDURE — 85027 COMPLETE CBC AUTOMATED: CPT

## 2023-10-14 PROCEDURE — 80061 LIPID PANEL: CPT

## 2023-10-14 PROCEDURE — 36415 COLL VENOUS BLD VENIPUNCTURE: CPT

## 2023-10-14 PROCEDURE — 80053 COMPREHEN METABOLIC PANEL: CPT

## 2023-10-14 PROCEDURE — 83036 HEMOGLOBIN GLYCOSYLATED A1C: CPT

## 2023-10-20 ENCOUNTER — OFFICE VISIT (OUTPATIENT)
Dept: CARDIOLOGY | Facility: CLINIC | Age: 88
End: 2023-10-20
Payer: MEDICARE

## 2023-10-20 ENCOUNTER — OFFICE VISIT (OUTPATIENT)
Dept: PRIMARY CARE | Facility: CLINIC | Age: 88
End: 2023-10-20
Payer: MEDICARE

## 2023-10-20 VITALS
DIASTOLIC BLOOD PRESSURE: 78 MMHG | OXYGEN SATURATION: 96 % | WEIGHT: 133.8 LBS | BODY MASS INDEX: 22.97 KG/M2 | HEART RATE: 70 BPM | SYSTOLIC BLOOD PRESSURE: 158 MMHG

## 2023-10-20 VITALS
SYSTOLIC BLOOD PRESSURE: 150 MMHG | DIASTOLIC BLOOD PRESSURE: 74 MMHG | HEIGHT: 64 IN | WEIGHT: 135.8 LBS | BODY MASS INDEX: 23.18 KG/M2

## 2023-10-20 DIAGNOSIS — I10 BENIGN ESSENTIAL HTN: ICD-10-CM

## 2023-10-20 DIAGNOSIS — E11.9 CONTROLLED TYPE 2 DIABETES MELLITUS WITHOUT COMPLICATION, WITHOUT LONG-TERM CURRENT USE OF INSULIN (MULTI): ICD-10-CM

## 2023-10-20 DIAGNOSIS — E78.5 DYSLIPIDEMIA: ICD-10-CM

## 2023-10-20 DIAGNOSIS — E78.00 HYPERCHOLESTEROLEMIA: ICD-10-CM

## 2023-10-20 DIAGNOSIS — K80.50 CHOLEDOCHOLITHIASIS: ICD-10-CM

## 2023-10-20 DIAGNOSIS — I25.10 ARTERIOSCLEROTIC CARDIOVASCULAR DISEASE: Primary | ICD-10-CM

## 2023-10-20 PROCEDURE — 3077F SYST BP >= 140 MM HG: CPT | Performed by: INTERNAL MEDICINE

## 2023-10-20 PROCEDURE — 1126F AMNT PAIN NOTED NONE PRSNT: CPT | Performed by: INTERNAL MEDICINE

## 2023-10-20 PROCEDURE — 99214 OFFICE O/P EST MOD 30 MIN: CPT | Mod: PO | Performed by: INTERNAL MEDICINE

## 2023-10-20 PROCEDURE — 1159F MED LIST DOCD IN RCRD: CPT | Performed by: INTERNAL MEDICINE

## 2023-10-20 PROCEDURE — 99214 OFFICE O/P EST MOD 30 MIN: CPT | Performed by: INTERNAL MEDICINE

## 2023-10-20 PROCEDURE — 1036F TOBACCO NON-USER: CPT | Performed by: INTERNAL MEDICINE

## 2023-10-20 PROCEDURE — 3078F DIAST BP <80 MM HG: CPT | Performed by: INTERNAL MEDICINE

## 2023-10-20 PROCEDURE — 99213 OFFICE O/P EST LOW 20 MIN: CPT | Performed by: INTERNAL MEDICINE

## 2023-10-20 RX ORDER — URSODIOL 300 MG/1
300 CAPSULE ORAL 2 TIMES DAILY
COMMUNITY
End: 2023-10-20 | Stop reason: SDUPTHER

## 2023-10-20 RX ORDER — URSODIOL 300 MG/1
300 CAPSULE ORAL 2 TIMES DAILY
Qty: 180 CAPSULE | Refills: 0 | Status: SHIPPED | OUTPATIENT
Start: 2023-10-20 | End: 2024-03-29 | Stop reason: SDUPTHER

## 2023-10-20 ASSESSMENT — LIFESTYLE VARIABLES
HOW MANY STANDARD DRINKS CONTAINING ALCOHOL DO YOU HAVE ON A TYPICAL DAY: PATIENT DOES NOT DRINK
HOW OFTEN DO YOU HAVE A DRINK CONTAINING ALCOHOL: NEVER
AUDIT-C TOTAL SCORE: 0
SKIP TO QUESTIONS 9-10: 1
HOW OFTEN DO YOU HAVE SIX OR MORE DRINKS ON ONE OCCASION: NEVER

## 2023-10-20 ASSESSMENT — PAIN SCALES - GENERAL: PAINLEVEL: 0-NO PAIN

## 2023-10-20 ASSESSMENT — ENCOUNTER SYMPTOMS: ABDOMINAL PAIN: 1

## 2023-10-20 NOTE — PROGRESS NOTES
Subjective   Catie Groves is a 89 y.o. female.    Chief Complaint:  No chief complaint on file.    HPI  This is a 89 y/o female here today for a six month Cardiology follow up visit. She was hospitalized in March 2022 at [facility] with HTN and heart palpitations- see discharge note. Today she denies chest pain, heart palpitations, sob, or pedal edema. She has chronic back pain and has gotten two injections (Cortizone) for pain. Reviewed lab work results and current medications.    Review of Systems   All other systems reviewed and are negative.      Objective   Cardiovascular:      PMI at left midclavicular line. Normal rate. Regular rhythm. Normal S1. Normal S2.       Murmurs: There is no murmur.      No gallop.  No click. No rub.   Pulses:     Intact distal pulses.   Edema:     Peripheral edema absent.         Lab Review:   Lab on 10/14/2023   Component Date Value    WBC 10/14/2023 8.4     nRBC 10/14/2023 0.0     RBC 10/14/2023 4.03     Hemoglobin 10/14/2023 12.7     Hematocrit 10/14/2023 40.7     MCV 10/14/2023 101 (H)     MCH 10/14/2023 31.5     MCHC 10/14/2023 31.2 (L)     RDW 10/14/2023 12.6     Platelets 10/14/2023 311     MPV 10/14/2023 10.0     Glucose 10/14/2023 103 (H)     Sodium 10/14/2023 141     Potassium 10/14/2023 4.5     Chloride 10/14/2023 106     Bicarbonate 10/14/2023 23     Anion Gap 10/14/2023 17     Urea Nitrogen 10/14/2023 26 (H)     Creatinine 10/14/2023 1.27 (H)     eGFR 10/14/2023 39 (L)     Calcium 10/14/2023 9.8     Albumin 10/14/2023 4.2     Alkaline Phosphatase 10/14/2023 125     Total Protein 10/14/2023 6.7     AST 10/14/2023 22     Bilirubin, Total 10/14/2023 0.4     ALT 10/14/2023 15     Cholesterol 10/14/2023 168     HDL-Cholesterol 10/14/2023 59.4     Cholesterol/HDL Ratio 10/14/2023 2.8     LDL Calculated 10/14/2023 74     VLDL 10/14/2023 34     Triglycerides 10/14/2023 171 (H)     Non HDL Cholesterol 10/14/2023 109     Hemoglobin A1C 10/14/2023 6.4 (H)     Estimated Average  Glucose 10/14/2023 137    Lab on 08/24/2023   Component Date Value    WBC 08/24/2023 9.8     nRBC 08/24/2023 0.0     RBC 08/24/2023 4.42     Hemoglobin 08/24/2023 13.7     Hematocrit 08/24/2023 43.4     MCV 08/24/2023 98     MCHC 08/24/2023 31.6 (L)     Platelets 08/24/2023 275     RDW 08/24/2023 11.9     Glucose 08/24/2023 133 (H)     Sodium 08/24/2023 138     Potassium 08/24/2023 4.4     Chloride 08/24/2023 105     Bicarbonate 08/24/2023 21     Anion Gap 08/24/2023 16     Urea Nitrogen 08/24/2023 22     Creatinine 08/24/2023 1.13 (H)     GFR Female 08/24/2023 45 (A)     Calcium 08/24/2023 10.3    Lab on 08/05/2023   Component Date Value    Urine Culture 08/05/2023 **Culture Comments - See Below     Color, Urine 08/05/2023 COLORLESS     Appearance, Urine 08/05/2023 CLEAR     Specific Gravity, Urine 08/05/2023 1.005     pH, Urine 08/05/2023 6.0     Protein, Urine 08/05/2023 NEGATIVE     Glucose, Urine 08/05/2023 NEGATIVE     Blood, Urine 08/05/2023 NEGATIVE     Ketones, Urine 08/05/2023 NEGATIVE     Bilirubin, Urine 08/05/2023 NEGATIVE     Urobilinogen, Urine 08/05/2023 <2.0     Nitrite, Urine 08/05/2023 NEGATIVE     Leukocyte Esterase, Urine 08/05/2023 NEGATIVE    Lab on 06/28/2023   Component Date Value    WBC 06/28/2023 8.6     nRBC 06/28/2023 0.0     RBC 06/28/2023 3.94 (L)     Hemoglobin 06/28/2023 12.6     Hematocrit 06/28/2023 39.0     MCV 06/28/2023 99     MCHC 06/28/2023 32.3     Platelets 06/28/2023 273     RDW 06/28/2023 11.7     Glucose 06/28/2023 142 (H)     Sodium 06/28/2023 140     Potassium 06/28/2023 5.4 (H)     Chloride 06/28/2023 107     Bicarbonate 06/28/2023 22     Anion Gap 06/28/2023 16     Urea Nitrogen 06/28/2023 38 (H)     Creatinine 06/28/2023 1.93 (H)     GFR Female 06/28/2023 24 (A)     Calcium 06/28/2023 10.0     Albumin 06/28/2023 4.2     Alkaline Phosphatase 06/28/2023 108     Total Protein 06/28/2023 6.8     AST 06/28/2023 19     Total Bilirubin 06/28/2023 0.5     ALT (SGPT)  06/28/2023 12     Hemoglobin A1C 06/28/2023 6.4 (A)     Estimated Average Glucose 06/28/2023 137     Cholesterol 06/28/2023 145     HDL 06/28/2023 45.5     Cholesterol/HDL Ratio 06/28/2023 3.2     LDL 06/28/2023 70     VLDL 06/28/2023 30     Triglycerides 06/28/2023 150 (H)     Vitamin D, 25-Hydroxy 06/28/2023 25 (A)        Assessment/Plan   Impressions  Assessment:  1 CAD with anteroseptal MI 01/19/1995.    2. Status post PCI to high-grade mid LAD stenosis 04/19/1995. Patient is without any anginal symptoms. She did have an IV pharmacological nuclear stress test on 04/19/2013. That was negative for evidence of ischemia. The patient did have a repeat cardiac catheter performed parenthetically in 12/ 2008, with results as noted. More recently the patient was admitted to [facility] on 6/6/2014 with recurrent diverticulitis along with some intermittent chest tightness/discomfort. As such she did have a pharmacological nuclear stress test performed on 6/9/2014 which was negative for evidence of ischemia. Patient was admitted to [facility] on 11/3/2014 for chest pain and hypertension. She has not had any recurrent chest pain. ECG done today shows NSR.    3. Status post a right groin exploration and repair of right femoral artery occlusion following cardiac cath 12/2008.    4. Hyperlipidemia. The patient will continue atorvastatin 40 mg daily. Recent lipid panel from 12/9/2020 includes cholesterol 158 LDL 77 HDL 52 triglyceride 143. The SMA panel was normal except creatinine 1.37. Glycohemoglobin was 6.1%.    5. Hypertension. The patient's blood pressure is slightly elevated today. The patient has a considerable degree of anxiety with respect to her blood pressure. Will continue amlodipine 10 mg daily, losartan 100 mg daily and metoprolol  mg daily unchanged for now.    6. Type 2 diabetes. Diabetic control has been satisfactory. Her recent glycohemoglobin was 6.1% on 12/9/2020.    7. Laparoscopic  cholecystectomy with multiple postoperative ERCPs 7/2006.    8. History of pancreas divisum.    9. Hyperplastic colonic polyps.    10. Lumbosacral spinal DJD with lumbar radiculopathy.    11. History of DJD.    12. History of Gamble's neuroma, left foot.    13. History of pulmonary embolism, left lower lobe.    14, former Coumadin anticoagulation.    15. Gastroparesis.    16. GERD.    17. History of splenic artery aneurysm. The patient had a repeat CT angiogram on 10/29/2013 which demonstrated that the splenic artery aneurysm was unchanged in size.    18. History of reactive depression.    19. History of syncope with head trauma and tiny intracranial subarachnoid hemorrhage. Please see previous office notes for review of the day of 5/31. The patient had gotten up from the couch to use the bathroom when she became woozy lightheaded and fell, striking her head. She was taken [facility]. Head CT showed a left parietal scalp hematoma, along with a small 2 x 7 mm focus of increased attenuation within the left frontal sulcus consistent with a small amount of subarachnoid hemorrhage or petechial hemorrhage of adjacent cortex with no mass effect. The patient was transferred to [facility] observed for 2 days. Repeat CT scanning was done during that admission. It was thought that she might have had orthostatic hypotension as the cause of her fall and as noted. Her previously prescribed imdur was stopped. Patient has not had any recurrent syncopal episodes.     20. Status post partial colectomy for diverticulitis 6/16/2014 with postoperative C. difficile colitis. This patient was admitted to [facility] on 6/6/2014 with acute diverticulitis. She ultimately underwent a partial colectomy on 6/16. She was hospitalized for a period of one week afterwards and then sent to [facility] for 2 weeks rehabilitation. She was then readmitted to [facility] because of C. difficile colitis and was  hospitalized for another 2 weeks. She was sent to [place] for rehabilitation for one week.     21. Hernia repair 2/2015 with Dr. Rodriguez at [facility].     22. Carotid vascular disease.    23. Palpitations. Hospital stay 09/2018 at [facility]. Was on metoprolol succinate 200 mg, but has decreased it to 100 mg without return of symptoms.     24. TIA. Had hospital stay at [facility] 01/17/2020 for suspected TIA. Had vision changes and buzzing in the head which have almost completely resolved. CT of brain was negative. MRI of brain was negative, but showed mastoiditis. She will schedule to see ENT. Will continue plavix. Is off asa.     25. Hx of covid-19 vaccine #1 and #2.

## 2023-10-20 NOTE — PROGRESS NOTES
Subjective   Patient ID: Catie Groves is a 89 y.o. female who presents for Follow-up.    Abdominal Pain    Patient is here for routine follow-up  Her abdominal pain is doing better  She went to the GI and had colonoscopy done which was unremarkable.  Her pain went away by itself   Needs medication refill  Follow-up on hypertension diabetes high cholesterol did blood work     patient is here still having abdominal bloating still having left lower quadrant discomfort and very worried that something is wrong   She is drinking enough water  If you are in the morning but during the day does not pee as much     patient is here because she is still having off-and-on abdominal pain and feels very distended not having good bowel movement in spite of taking lactulose     Patient is here with complaints of having abdominal pain.  Last Wednesday went to the ER urine grew Klebsiella pneumonia treated for UTI  2 weeks ago was very constipated stomach was hurting all over no still gets distended and not having good bowel movement  She is on gabapentin by Dr. quesada  Her back also hurts quite a bit      patient is here for follow-up  Follow-up on coronary artery disease hypertension high cholesterol anxiety  Did blood work  Needs medications  Patient got II nerve blocks back pain is doing little better but she has really suffered from the back pain.  Steroid only helped temporarily      past recap  Blood pressure  Needs medication refill  Shingles pain is doing better  Follow-up on coronary artery disease hypertension high cholesterol  Anxiety is doing better sometimes she takes tramadol  Wants nausea medication for occasional use  Wants handicap sticker  Shingles vaccine she is due  Overall doing well        Patient is complaining of having right upper quadrant pain and soreness having loose stools cholestyramine is not agreeing  She is worried that her GI issues are flaring up  She has history of common bile duct stones and  "irritable bowel disease  She also has history of diverticulitis C. difficile colitis     patient went to see the GI for the abdominal discomfort she was having she had MRI done CAT scans and ultrasound done everything came back normal  She did blood work for cholesterol diabetes  Here for follow-up and needs refills on medications  Overall she is doing great  Wants prescription for Zofran for occasional use      patient fell and broke her nose has a lot of bruises. On Wednesday she was walking in the parking lot to get groceries and she fell on her face  Now she is having pain in the right elbow she did not get x-ray of the elbow done concerned if she has broken the bone  She has sutures on the chin     Patient here for follow-up on hospital stay  Was admitted for TIA symptoms started on Plavix  Her blood pressure was very high last evening she is concerned if Plavix is making her blood pressure goal  She called me early morning hours around 5 spoke to her and after that she felt comfortable and slept  blood pressure is better now   She is also concerned about findings on the mastoid in MRI  Still feels sinuses to be congested        Review of Systems   Gastrointestinal:  Positive for abdominal pain.   Musculoskeletal:  Positive for back pain.        Review of Systems   Gastrointestinal:  Positive for abdominal pain.       Objective   /74   Ht 1.626 m (5' 4\")   Wt 61.6 kg (135 lb 12.8 oz)   BMI 23.31 kg/m²     Physical Exam  Vitals reviewed.   Constitutional:       Appearance: Normal appearance.   HENT:      Head: Normocephalic and atraumatic.      Right Ear: Tympanic membrane, ear canal and external ear normal.      Left Ear: Tympanic membrane, ear canal and external ear normal.      Nose: Nose normal.      Mouth/Throat:      Pharynx: Oropharynx is clear.   Eyes:      Extraocular Movements: Extraocular movements intact.      Conjunctiva/sclera: Conjunctivae normal.      Pupils: Pupils are equal, round, and " reactive to light.   Cardiovascular:      Rate and Rhythm: Normal rate and regular rhythm.      Pulses: Normal pulses.      Heart sounds: Normal heart sounds.   Pulmonary:      Effort: Pulmonary effort is normal.      Breath sounds: Normal breath sounds.   Abdominal:      General: Abdomen is flat. Bowel sounds are normal.      Palpations: Abdomen is soft.   Musculoskeletal:      Cervical back: Normal range of motion and neck supple.   Skin:     General: Skin is warm and dry.   Neurological:      General: No focal deficit present.      Mental Status: She is alert and oriented to person, place, and time.   Psychiatric:         Mood and Affect: Mood normal.         Assessment/Plan   Problem List Items Addressed This Visit          Cardiac and Vasculature    Benign essential HTN    Relevant Orders    CBC    Comprehensive Metabolic Panel    Lipid Panel    CBC    Comprehensive Metabolic Panel       Endocrine/Metabolic    Controlled diabetes mellitus type II without complication (CMS/HCC)    Relevant Orders    Hemoglobin A1C    Hemoglobin A1C       Gastrointestinal and Abdominal    Choledocholithiasis    Relevant Medications    ursodiol (Actigall) 300 mg capsule     Other Visit Diagnoses       Dyslipidemia        Relevant Orders    Lipid Panel        past recap  Patient symptoms could be related to gastroparesis  Patient quit taking Reglan  We we will try Reglan again  Try sucralfate to help with the acid reflux  She needs Plavix for her coronary artery disease and I do not think symptoms are caused by Plavix  Increase fiber in the diet  Follow-up if not better     10/21/22     Blood work results reviewed  Cholesterol well controlled  Sugar well controlled  Blood pressure stable  Patient had follow-up with a cardiologist coronary artery disease stable  Advised patient to take Metamucil to help with the loose stool  Explained that not safe to give standing order for antibiotics  Refer to physical therapy for lower back pain  and hip pain  Medications refilled  Follow-up in 6 months     4/21/2023  Blood pressure stable  Creatinine has gone up to 1.69 BUN 42  Encourage patient to drink more water  Cut down salt intake if not better will do ultrasound kidneys and recheck blood work in 3 months  A1c has gone up to 7.6 patient has been really well controlled on diabetes all along  Steroids must be doing it  We will recheck and see if needs medication adjustment  Patient wants to wait until next blood work  Cholesterol is okay  Follow-up blood work in 3 months     7/31/2023  ER records reviewed  BUN 23 creatinine 1.5 potassium 5.1  Last blood work here showed elevated creatinine and potassium was high but at that time patient says she was getting nerve blocks which made her kidneys work well  We will check UA C&S  Try lactulose for constipation  Medrol Dosepak for the back pain  Follow-up if not better     8/7/2023  Patient has distended abdomen  Stat CT of the abdomen pelvis done without contrast because of compromised kidney function  No acute pathology found  Advised patient to take lactulose twice a day or 3 times a day to see if it helps to improve and regularize her bowel movements  Follow-up if not better            8/24/2023  All blood work has been negative  CAT scan has been negative  We will do the ultrasound of the abdomen to make sure there is no urinary retention causing the symptoms  Refer to GI if ultrasound is negative  10/20/2023  Blood pressure stable  Cholesterol well controlled  Diabetes under control  Ursodiol given for bile duct/  Coronary artery disease stable patient to care of cardiologist  Medications refilled  Follow-up in 6 months

## 2023-10-23 DIAGNOSIS — E11.9 CONTROLLED TYPE 2 DIABETES MELLITUS WITHOUT COMPLICATION, WITHOUT LONG-TERM CURRENT USE OF INSULIN (MULTI): ICD-10-CM

## 2023-10-23 DIAGNOSIS — K21.9 GASTROESOPHAGEAL REFLUX DISEASE WITHOUT ESOPHAGITIS: ICD-10-CM

## 2023-10-23 RX ORDER — GLIPIZIDE 5 MG/1
5 TABLET ORAL DAILY
Qty: 90 TABLET | Refills: 0 | Status: SHIPPED | OUTPATIENT
Start: 2023-10-23 | End: 2024-01-25 | Stop reason: SDUPTHER

## 2023-10-23 RX ORDER — PANTOPRAZOLE SODIUM 40 MG/1
40 TABLET, DELAYED RELEASE ORAL
Qty: 90 TABLET | Refills: 0 | Status: SHIPPED | OUTPATIENT
Start: 2023-10-23 | End: 2024-01-25 | Stop reason: SDUPTHER

## 2023-10-31 ENCOUNTER — OFFICE VISIT (OUTPATIENT)
Dept: OPHTHALMOLOGY | Facility: CLINIC | Age: 88
End: 2023-10-31
Payer: MEDICARE

## 2023-10-31 DIAGNOSIS — H53.002 AMBLYOPIA OF LEFT EYE: ICD-10-CM

## 2023-10-31 DIAGNOSIS — H35.3131 EARLY DRY STAGE NONEXUDATIVE AGE-RELATED MACULAR DEGENERATION OF BOTH EYES: ICD-10-CM

## 2023-10-31 DIAGNOSIS — H52.13 BILATERAL MYOPIA: ICD-10-CM

## 2023-10-31 DIAGNOSIS — Z96.1 PSEUDOPHAKIA: ICD-10-CM

## 2023-10-31 DIAGNOSIS — H52.223 REGULAR ASTIGMATISM OF BOTH EYES: ICD-10-CM

## 2023-10-31 DIAGNOSIS — E11.9 TYPE 2 DIABETES MELLITUS WITHOUT RETINOPATHY (MULTI): Primary | ICD-10-CM

## 2023-10-31 PROCEDURE — 92014 COMPRE OPH EXAM EST PT 1/>: CPT | Performed by: STUDENT IN AN ORGANIZED HEALTH CARE EDUCATION/TRAINING PROGRAM

## 2023-10-31 PROCEDURE — 92134 CPTRZ OPH DX IMG PST SGM RTA: CPT | Mod: BILATERAL PROCEDURE | Performed by: STUDENT IN AN ORGANIZED HEALTH CARE EDUCATION/TRAINING PROGRAM

## 2023-10-31 ASSESSMENT — REFRACTION_WEARINGRX
OS_ADD: +3.00
OD_ADD: +3.00
OD_AXIS: 056
OD_SPHERE: -4.25
OS_CYLINDER: +4.50
OS_AXIS: 134
OD_CYLINDER: +1.75
OS_SPHERE: -6.00

## 2023-10-31 ASSESSMENT — ENCOUNTER SYMPTOMS
ALLERGIC/IMMUNOLOGIC NEGATIVE: 0
CARDIOVASCULAR NEGATIVE: 0
MUSCULOSKELETAL NEGATIVE: 0
RESPIRATORY NEGATIVE: 0
ENDOCRINE NEGATIVE: 0
GASTROINTESTINAL NEGATIVE: 0
PSYCHIATRIC NEGATIVE: 0
EYES NEGATIVE: 0
HEMATOLOGIC/LYMPHATIC NEGATIVE: 0
NEUROLOGICAL NEGATIVE: 0
CONSTITUTIONAL NEGATIVE: 0

## 2023-10-31 ASSESSMENT — REFRACTION_MANIFEST
OD_SPHERE: -6.25
OD_AXIS: 055
OS_AXIS: 136
OS_CYLINDER: +4.50
OS_CYLINDER: +5.50
OD_AXIS: 055
OD_SPHERE: -4.25
OS_SPHERE: -6.50
OD_CYLINDER: +2.75
OS_AXIS: 136
METHOD_AUTOREFRACTION: 1
OD_CYLINDER: +1.50
OS_SPHERE: -6.00

## 2023-10-31 ASSESSMENT — TONOMETRY
OS_IOP_MMHG: 17
OD_IOP_MMHG: 17
IOP_METHOD: GOLDMANN APPLANATION

## 2023-10-31 ASSESSMENT — VISUAL ACUITY
CORRECTION_TYPE: GLASSES
OS_CC+: -2
METHOD: SNELLEN - LINEAR
OS_CC: 20/70
OD_CC: 20/30
OD_CC+: -2

## 2023-10-31 ASSESSMENT — EXTERNAL EXAM - RIGHT EYE: OD_EXAM: DERMATOCHALASIS UL

## 2023-10-31 ASSESSMENT — EXTERNAL EXAM - LEFT EYE: OS_EXAM: DERMATOCHALASIS UL

## 2023-10-31 ASSESSMENT — SLIT LAMP EXAM - LIDS
COMMENTS: NORMAL
COMMENTS: NORMAL

## 2023-10-31 ASSESSMENT — CONF VISUAL FIELD
OS_SUPERIOR_TEMPORAL_RESTRICTION: 0
OD_NORMAL: 1
OS_SUPERIOR_NASAL_RESTRICTION: 0
OD_INFERIOR_NASAL_RESTRICTION: 0
METHOD: COUNTING FINGERS
OS_INFERIOR_NASAL_RESTRICTION: 0
OS_NORMAL: 1
OS_INFERIOR_TEMPORAL_RESTRICTION: 0
OD_INFERIOR_TEMPORAL_RESTRICTION: 0
OD_SUPERIOR_NASAL_RESTRICTION: 0
OD_SUPERIOR_TEMPORAL_RESTRICTION: 0

## 2023-10-31 ASSESSMENT — CUP TO DISC RATIO
OS_RATIO: .30
OD_RATIO: .30

## 2023-10-31 NOTE — PROGRESS NOTES
Assessment/Plan   Diagnoses and all orders for this visit:  Type 2 diabetes mellitus without retinopathy (CMS/HCC)  -No retinopathy observed on exam today od/os, pt ed to continue good BGlc, blood pressure and lipid control, rtc with any changes in vision, otherwise monitor 1 year  Early dry stage nonexudative age-related macular degeneration of both eyes  -Stable findings today. Ed on continued healthy diet/exercise/no smoking/sunglasses/ and monitoring visual acuity (VA) with amsler and RTC immediately for any vision changes.  -OCT MAC stable today  Amblyopia of left eye  Bilateral myopia  Regular astigmatism of both eyes  Pseudophakia  -Stable spec RX today. BCVA overall stable right eye (OD) 20/30 left eye (OS) 20/70.     RTC 6 months for DFE and Oct MAC

## 2024-01-25 DIAGNOSIS — E11.9 CONTROLLED TYPE 2 DIABETES MELLITUS WITHOUT COMPLICATION, WITHOUT LONG-TERM CURRENT USE OF INSULIN (MULTI): ICD-10-CM

## 2024-01-25 DIAGNOSIS — K21.9 GASTROESOPHAGEAL REFLUX DISEASE WITHOUT ESOPHAGITIS: ICD-10-CM

## 2024-01-25 RX ORDER — PANTOPRAZOLE SODIUM 40 MG/1
40 TABLET, DELAYED RELEASE ORAL
Qty: 90 TABLET | Refills: 0 | Status: SHIPPED | OUTPATIENT
Start: 2024-01-25 | End: 2024-03-29 | Stop reason: SDUPTHER

## 2024-01-25 RX ORDER — GLIPIZIDE 5 MG/1
5 TABLET ORAL DAILY
Qty: 90 TABLET | Refills: 0 | Status: SHIPPED | OUTPATIENT
Start: 2024-01-25 | End: 2024-03-29 | Stop reason: SDUPTHER

## 2024-02-13 ENCOUNTER — OFFICE VISIT (OUTPATIENT)
Dept: PRIMARY CARE | Facility: CLINIC | Age: 89
End: 2024-02-13
Payer: MEDICARE

## 2024-02-13 ENCOUNTER — HOSPITAL ENCOUNTER (OUTPATIENT)
Dept: RADIOLOGY | Facility: CLINIC | Age: 89
Discharge: HOME | End: 2024-02-13
Payer: MEDICARE

## 2024-02-13 VITALS
SYSTOLIC BLOOD PRESSURE: 140 MMHG | DIASTOLIC BLOOD PRESSURE: 78 MMHG | WEIGHT: 135 LBS | BODY MASS INDEX: 23.05 KG/M2 | HEIGHT: 64 IN

## 2024-02-13 DIAGNOSIS — R10.9 ABDOMINAL PAIN, UNSPECIFIED ABDOMINAL LOCATION: ICD-10-CM

## 2024-02-13 DIAGNOSIS — M25.552 PAIN OF LEFT HIP: ICD-10-CM

## 2024-02-13 DIAGNOSIS — M25.561 ACUTE PAIN OF RIGHT KNEE: ICD-10-CM

## 2024-02-13 DIAGNOSIS — R10.84 GENERALIZED ABDOMINAL PAIN: ICD-10-CM

## 2024-02-13 PROBLEM — M47.816 SPONDYLOSIS OF LUMBAR REGION WITHOUT MYELOPATHY OR RADICULOPATHY: Status: ACTIVE | Noted: 2023-11-16

## 2024-02-13 PROCEDURE — 1159F MED LIST DOCD IN RCRD: CPT | Performed by: INTERNAL MEDICINE

## 2024-02-13 PROCEDURE — 73562 X-RAY EXAM OF KNEE 3: CPT | Mod: RT

## 2024-02-13 PROCEDURE — 3077F SYST BP >= 140 MM HG: CPT | Performed by: INTERNAL MEDICINE

## 2024-02-13 PROCEDURE — 1036F TOBACCO NON-USER: CPT | Performed by: INTERNAL MEDICINE

## 2024-02-13 PROCEDURE — 73502 X-RAY EXAM HIP UNI 2-3 VIEWS: CPT | Mod: LEFT SIDE | Performed by: STUDENT IN AN ORGANIZED HEALTH CARE EDUCATION/TRAINING PROGRAM

## 2024-02-13 PROCEDURE — 3078F DIAST BP <80 MM HG: CPT | Performed by: INTERNAL MEDICINE

## 2024-02-13 PROCEDURE — 73502 X-RAY EXAM HIP UNI 2-3 VIEWS: CPT | Mod: LT

## 2024-02-13 PROCEDURE — 1126F AMNT PAIN NOTED NONE PRSNT: CPT | Performed by: INTERNAL MEDICINE

## 2024-02-13 PROCEDURE — 99214 OFFICE O/P EST MOD 30 MIN: CPT | Performed by: INTERNAL MEDICINE

## 2024-02-13 ASSESSMENT — ENCOUNTER SYMPTOMS
LOSS OF SENSATION IN FEET: 0
DEPRESSION: 0
OCCASIONAL FEELINGS OF UNSTEADINESS: 0

## 2024-02-13 NOTE — PROGRESS NOTES
Subjective   Patient ID: Catie Groves is a 89 y.o. female who presents for Follow-up.    Abdominal Pain    Patient is here because she continues to have pain and funny sensation in the left lower quadrant   Patient is wants her kidneys checked she is worried about the kidneys.  She is also having pain in the right knee having discomfort and having discomfort when walking  She got nerve block from Dr. Quesada for lumbar radiculopathy      Past recap  patient is here for routine follow-up  Her abdominal pain is doing better  She went to the GI and had colonoscopy done which was unremarkable.  Her pain went away by itself   Needs medication refill  Follow-up on hypertension diabetes high cholesterol did blood work     patient is here still having abdominal bloating still having left lower quadrant discomfort and very worried that something is wrong   She is drinking enough water  If you are in the morning but during the day does not pee as much     patient is here because she is still having off-and-on abdominal pain and feels very distended not having good bowel movement in spite of taking lactulose     Patient is here with complaints of having abdominal pain.  Last Wednesday went to the ER urine grew Klebsiella pneumonia treated for UTI  2 weeks ago was very constipated stomach was hurting all over no still gets distended and not having good bowel movement  She is on gabapentin by Dr. quesada  Her back also hurts quite a bit      patient is here for follow-up  Follow-up on coronary artery disease hypertension high cholesterol anxiety  Did blood work  Needs medications  Patient got II nerve blocks back pain is doing little better but she has really suffered from the back pain.  Steroid only helped temporarily      past recap  Blood pressure  Needs medication refill  Shingles pain is doing better  Follow-up on coronary artery disease hypertension high cholesterol  Anxiety is doing better sometimes she takes  "tramadol  Wants nausea medication for occasional use  Wants handicap sticker  Shingles vaccine she is due  Overall doing well        Patient is complaining of having right upper quadrant pain and soreness having loose stools cholestyramine is not agreeing  She is worried that her GI issues are flaring up  She has history of common bile duct stones and irritable bowel disease  She also has history of diverticulitis C. difficile colitis     patient went to see the GI for the abdominal discomfort she was having she had MRI done CAT scans and ultrasound done everything came back normal  She did blood work for cholesterol diabetes  Here for follow-up and needs refills on medications  Overall she is doing great  Wants prescription for Zofran for occasional use      patient fell and broke her nose has a lot of bruises. On Wednesday she was walking in the parking lot to get groceries and she fell on her face  Now she is having pain in the right elbow she did not get x-ray of the elbow done concerned if she has broken the bone  She has sutures on the chin     Patient here for follow-up on hospital stay  Was admitted for TIA symptoms started on Plavix  Her blood pressure was very high last evening she is concerned if Plavix is making her blood pressure goal  She called me early morning hours around 5 spoke to her and after that she felt comfortable and slept  blood pressure is better now   She is also concerned about findings on the mastoid in MRI  Still feels sinuses to be congested        Review of Systems   Gastrointestinal:  Positive for abdominal pain.   Musculoskeletal:  Positive for back pain.        Review of Systems   Gastrointestinal:  Positive for abdominal pain.       Objective   /78   Ht 1.626 m (5' 4\")   Wt 61.2 kg (135 lb)   BMI 23.17 kg/m²     Physical Exam  Vitals reviewed.   Constitutional:       Appearance: Normal appearance.   HENT:      Head: Normocephalic and atraumatic.      Right Ear: Tympanic " membrane, ear canal and external ear normal.      Left Ear: Tympanic membrane, ear canal and external ear normal.      Nose: Nose normal.      Mouth/Throat:      Pharynx: Oropharynx is clear.   Eyes:      Extraocular Movements: Extraocular movements intact.      Conjunctiva/sclera: Conjunctivae normal.      Pupils: Pupils are equal, round, and reactive to light.   Cardiovascular:      Rate and Rhythm: Normal rate and regular rhythm.      Pulses: Normal pulses.      Heart sounds: Normal heart sounds.   Pulmonary:      Effort: Pulmonary effort is normal.      Breath sounds: Normal breath sounds.   Abdominal:      General: Abdomen is flat. Bowel sounds are normal.      Palpations: Abdomen is soft.   Musculoskeletal:      Cervical back: Normal range of motion and neck supple.   Skin:     General: Skin is warm and dry.   Neurological:      General: No focal deficit present.      Mental Status: She is alert and oriented to person, place, and time.   Psychiatric:         Mood and Affect: Mood normal.         Assessment/Plan   Problem List Items Addressed This Visit          Gastrointestinal and Abdominal    Abdominal pain    Relevant Orders    US renal complete (Completed)    US bladder (Completed)       Musculoskeletal and Injuries    Knee pain    Relevant Medications    methylPREDNISolone (Medrol Dospak) 4 mg tablets     Other Visit Diagnoses       Pain of left hip        Relevant Medications    methylPREDNISolone (Medrol Dospak) 4 mg tablets    Other Relevant Orders    XR hip left with pelvis when performed 2 or 3 views (Completed)        past recap  Patient symptoms could be related to gastroparesis  Patient quit taking Reglan  We we will try Reglan again  Try sucralfate to help with the acid reflux  She needs Plavix for her coronary artery disease and I do not think symptoms are caused by Plavix  Increase fiber in the diet  Follow-up if not better     10/21/22     Blood work results reviewed  Cholesterol well  controlled  Sugar well controlled  Blood pressure stable  Patient had follow-up with a cardiologist coronary artery disease stable  Advised patient to take Metamucil to help with the loose stool  Explained that not safe to give standing order for antibiotics  Refer to physical therapy for lower back pain and hip pain  Medications refilled  Follow-up in 6 months     4/21/2023  Blood pressure stable  Creatinine has gone up to 1.69 BUN 42  Encourage patient to drink more water  Cut down salt intake if not better will do ultrasound kidneys and recheck blood work in 3 months  A1c has gone up to 7.6 patient has been really well controlled on diabetes all along  Steroids must be doing it  We will recheck and see if needs medication adjustment  Patient wants to wait until next blood work  Cholesterol is okay  Follow-up blood work in 3 months     7/31/2023  ER records reviewed  BUN 23 creatinine 1.5 potassium 5.1  Last blood work here showed elevated creatinine and potassium was high but at that time patient says she was getting nerve blocks which made her kidneys work well  We will check UA C&S  Try lactulose for constipation  Medrol Dosepak for the back pain  Follow-up if not better     8/7/2023  Patient has distended abdomen  Stat CT of the abdomen pelvis done without contrast because of compromised kidney function  No acute pathology found  Advised patient to take lactulose twice a day or 3 times a day to see if it helps to improve and regularize her bowel movements  Follow-up if not better            8/24/2023  All blood work has been negative  CAT scan has been negative  We will do the ultrasound of the abdomen to make sure there is no urinary retention causing the symptoms  Refer to GI if ultrasound is negative    10/20/2023  Blood pressure stable  Cholesterol well controlled  Diabetes under control  Ursodiol given for bile duct/  Coronary artery disease stable patient to care of cardiologist  Medications  refilled  Follow-up in 6 months    2/13/2024  Will check ultrasound kidney and bladder  Will check x-ray of the left hip to see if that is causing the pain will check x-ray of the right knee  Medrol Dosepak to help with the knee pain

## 2024-02-14 RX ORDER — METHYLPREDNISOLONE 4 MG/1
TABLET ORAL
Qty: 21 TABLET | Refills: 0 | Status: SHIPPED
Start: 2024-02-14 | End: 2024-03-08 | Stop reason: HOSPADM

## 2024-02-15 ENCOUNTER — HOSPITAL ENCOUNTER (OUTPATIENT)
Dept: RADIOLOGY | Facility: HOSPITAL | Age: 89
Discharge: HOME | End: 2024-02-15
Payer: MEDICARE

## 2024-02-15 ENCOUNTER — APPOINTMENT (OUTPATIENT)
Dept: RADIOLOGY | Facility: HOSPITAL | Age: 89
End: 2024-02-15
Payer: MEDICARE

## 2024-02-15 DIAGNOSIS — R10.84 GENERALIZED ABDOMINAL PAIN: ICD-10-CM

## 2024-02-15 PROCEDURE — 76857 US EXAM PELVIC LIMITED: CPT

## 2024-02-15 PROCEDURE — 76770 US EXAM ABDO BACK WALL COMP: CPT

## 2024-02-21 ASSESSMENT — ENCOUNTER SYMPTOMS: ABDOMINAL PAIN: 1

## 2024-02-22 ENCOUNTER — OFFICE VISIT (OUTPATIENT)
Dept: PRIMARY CARE | Facility: CLINIC | Age: 89
End: 2024-02-22
Payer: MEDICARE

## 2024-02-22 VITALS
HEIGHT: 64 IN | SYSTOLIC BLOOD PRESSURE: 124 MMHG | WEIGHT: 135 LBS | BODY MASS INDEX: 23.05 KG/M2 | DIASTOLIC BLOOD PRESSURE: 72 MMHG

## 2024-02-22 DIAGNOSIS — J32.9 SINUSITIS, UNSPECIFIED CHRONICITY, UNSPECIFIED LOCATION: Primary | ICD-10-CM

## 2024-02-22 PROCEDURE — 1126F AMNT PAIN NOTED NONE PRSNT: CPT | Performed by: INTERNAL MEDICINE

## 2024-02-22 PROCEDURE — 3074F SYST BP LT 130 MM HG: CPT | Performed by: INTERNAL MEDICINE

## 2024-02-22 PROCEDURE — 99213 OFFICE O/P EST LOW 20 MIN: CPT | Performed by: INTERNAL MEDICINE

## 2024-02-22 PROCEDURE — 1159F MED LIST DOCD IN RCRD: CPT | Performed by: INTERNAL MEDICINE

## 2024-02-22 PROCEDURE — 1036F TOBACCO NON-USER: CPT | Performed by: INTERNAL MEDICINE

## 2024-02-22 PROCEDURE — 3078F DIAST BP <80 MM HG: CPT | Performed by: INTERNAL MEDICINE

## 2024-02-22 RX ORDER — LORATADINE 10 MG/1
10 TABLET ORAL DAILY
Qty: 30 TABLET | Refills: 2 | Status: SHIPPED | OUTPATIENT
Start: 2024-02-22 | End: 2024-05-22

## 2024-02-22 RX ORDER — AZITHROMYCIN 500 MG/1
500 TABLET, FILM COATED ORAL DAILY
Qty: 10 TABLET | Refills: 0 | Status: SHIPPED
Start: 2024-02-22 | End: 2024-03-08 | Stop reason: HOSPADM

## 2024-02-26 ENCOUNTER — TELEMEDICINE (OUTPATIENT)
Dept: PRIMARY CARE | Facility: CLINIC | Age: 89
End: 2024-02-26
Payer: MEDICARE

## 2024-02-26 DIAGNOSIS — U07.1 COVID-19: ICD-10-CM

## 2024-02-26 PROCEDURE — 1126F AMNT PAIN NOTED NONE PRSNT: CPT | Performed by: INTERNAL MEDICINE

## 2024-02-26 PROCEDURE — 1159F MED LIST DOCD IN RCRD: CPT | Performed by: INTERNAL MEDICINE

## 2024-02-26 PROCEDURE — 1036F TOBACCO NON-USER: CPT | Performed by: INTERNAL MEDICINE

## 2024-02-26 PROCEDURE — 99213 OFFICE O/P EST LOW 20 MIN: CPT | Performed by: INTERNAL MEDICINE

## 2024-03-03 ENCOUNTER — APPOINTMENT (OUTPATIENT)
Dept: RADIOLOGY | Facility: HOSPITAL | Age: 89
DRG: 308 | End: 2024-03-03
Payer: MEDICARE

## 2024-03-03 ENCOUNTER — HOSPITAL ENCOUNTER (INPATIENT)
Facility: HOSPITAL | Age: 89
LOS: 5 days | Discharge: HOME | DRG: 308 | End: 2024-03-08
Attending: STUDENT IN AN ORGANIZED HEALTH CARE EDUCATION/TRAINING PROGRAM | Admitting: INTERNAL MEDICINE
Payer: MEDICARE

## 2024-03-03 ENCOUNTER — APPOINTMENT (OUTPATIENT)
Dept: CARDIOLOGY | Facility: HOSPITAL | Age: 89
DRG: 308 | End: 2024-03-03
Payer: MEDICARE

## 2024-03-03 DIAGNOSIS — E86.0 DEHYDRATION: ICD-10-CM

## 2024-03-03 DIAGNOSIS — U07.1 COVID-19: Primary | ICD-10-CM

## 2024-03-03 DIAGNOSIS — R07.9 CHEST PAIN, UNSPECIFIED TYPE: ICD-10-CM

## 2024-03-03 DIAGNOSIS — R73.9 HYPERGLYCEMIA: ICD-10-CM

## 2024-03-03 DIAGNOSIS — R00.0 TACHYCARDIA: ICD-10-CM

## 2024-03-03 LAB
ALBUMIN SERPL-MCNC: 3.7 G/DL (ref 3.5–5)
ALP BLD-CCNC: 151 U/L (ref 35–125)
ALT SERPL-CCNC: 14 U/L (ref 5–40)
ANION GAP BLDV CALCULATED.4IONS-SCNC: 15 MMOL/L (ref 10–25)
ANION GAP SERPL CALC-SCNC: 14 MMOL/L
ANION GAP SERPL CALC-SCNC: >19 MMOL/L
APPEARANCE UR: CLEAR
AST SERPL-CCNC: 22 U/L (ref 5–40)
BASE EXCESS BLDV CALC-SCNC: -6 MMOL/L (ref -2–3)
BASE EXCESS BLDV CALC-SCNC: -6.6 MMOL/L (ref -2–3)
BASOPHILS # BLD AUTO: 0.04 X10*3/UL (ref 0–0.1)
BASOPHILS NFR BLD AUTO: 0.5 %
BILIRUB SERPL-MCNC: <0.2 MG/DL (ref 0.1–1.2)
BILIRUB UR STRIP.AUTO-MCNC: NEGATIVE MG/DL
BODY TEMPERATURE: 37 DEGREES CELSIUS
BODY TEMPERATURE: 37 DEGREES CELSIUS
BUN SERPL-MCNC: 27 MG/DL (ref 8–25)
BUN SERPL-MCNC: 27 MG/DL (ref 8–25)
CA-I BLDV-SCNC: 1.31 MMOL/L (ref 1.1–1.33)
CALCIUM SERPL-MCNC: 8.4 MG/DL (ref 8.5–10.4)
CALCIUM SERPL-MCNC: 9.4 MG/DL (ref 8.5–10.4)
CHLORIDE BLDV-SCNC: 103 MMOL/L (ref 98–107)
CHLORIDE SERPL-SCNC: 100 MMOL/L (ref 97–107)
CHLORIDE SERPL-SCNC: 95 MMOL/L (ref 97–107)
CO2 SERPL-SCNC: 15 MMOL/L (ref 24–31)
CO2 SERPL-SCNC: 17 MMOL/L (ref 24–31)
COLOR UR: COLORLESS
CREAT SERPL-MCNC: 1.5 MG/DL (ref 0.4–1.6)
CREAT SERPL-MCNC: 1.6 MG/DL (ref 0.4–1.6)
D DIMER PPP FEU-MCNC: 2.05 MG/L FEU (ref 0.19–0.5)
EGFRCR SERPLBLD CKD-EPI 2021: 30 ML/MIN/1.73M*2
EGFRCR SERPLBLD CKD-EPI 2021: 32 ML/MIN/1.73M*2
EOSINOPHIL # BLD AUTO: 0.03 X10*3/UL (ref 0–0.4)
EOSINOPHIL NFR BLD AUTO: 0.3 %
ERYTHROCYTE [DISTWIDTH] IN BLOOD BY AUTOMATED COUNT: 12.9 % (ref 11.5–14.5)
FLUAV RNA RESP QL NAA+PROBE: NOT DETECTED
FLUBV RNA RESP QL NAA+PROBE: NOT DETECTED
GLUCOSE BLDV-MCNC: 262 MG/DL (ref 74–99)
GLUCOSE SERPL-MCNC: 244 MG/DL (ref 65–99)
GLUCOSE SERPL-MCNC: 335 MG/DL (ref 65–99)
GLUCOSE UR STRIP.AUTO-MCNC: NORMAL MG/DL
HCO3 BLDV-SCNC: 18.5 MMOL/L (ref 22–26)
HCO3 BLDV-SCNC: 18.8 MMOL/L (ref 22–26)
HCT VFR BLD AUTO: 39.4 % (ref 36–46)
HCT VFR BLD EST: 35 % (ref 36–46)
HGB BLD-MCNC: 12.9 G/DL (ref 12–16)
HGB BLDV-MCNC: 11.8 G/DL (ref 12–16)
IMM GRANULOCYTES # BLD AUTO: 0.09 X10*3/UL (ref 0–0.5)
IMM GRANULOCYTES NFR BLD AUTO: 1 % (ref 0–0.9)
INHALED O2 CONCENTRATION: 100 %
INHALED O2 CONCENTRATION: 100 %
KETONES UR STRIP.AUTO-MCNC: NEGATIVE MG/DL
LACTATE BLDV-SCNC: 2.1 MMOL/L (ref 0.4–2)
LACTATE BLDV-SCNC: 2.5 MMOL/L (ref 0.4–2)
LEUKOCYTE ESTERASE UR QL STRIP.AUTO: NEGATIVE
LYMPHOCYTES # BLD AUTO: 1.46 X10*3/UL (ref 0.8–3)
LYMPHOCYTES NFR BLD AUTO: 16.9 %
MAGNESIUM SERPL-MCNC: 1.6 MG/DL (ref 1.6–3.1)
MCH RBC QN AUTO: 31.1 PG (ref 26–34)
MCHC RBC AUTO-ENTMCNC: 32.7 G/DL (ref 32–36)
MCV RBC AUTO: 95 FL (ref 80–100)
MONOCYTES # BLD AUTO: 0.5 X10*3/UL (ref 0.05–0.8)
MONOCYTES NFR BLD AUTO: 5.8 %
NEUTROPHILS # BLD AUTO: 6.54 X10*3/UL (ref 1.6–5.5)
NEUTROPHILS NFR BLD AUTO: 75.5 %
NITRITE UR QL STRIP.AUTO: NEGATIVE
NRBC BLD-RTO: 0 /100 WBCS (ref 0–0)
NT-PROBNP SERPL-MCNC: 939 PG/ML (ref 0–624)
OXYHGB MFR BLDV: 69.4 % (ref 45–75)
OXYHGB MFR BLDV: 71.6 % (ref 45–75)
PCO2 BLDV: 34 MM HG (ref 41–51)
PCO2 BLDV: 35 MM HG (ref 41–51)
PH BLDV: 7.33 PH (ref 7.33–7.43)
PH BLDV: 7.35 PH (ref 7.33–7.43)
PH UR STRIP.AUTO: 5 [PH]
PHOSPHATE SERPL-MCNC: 3.5 MG/DL (ref 2.5–4.5)
PLATELET # BLD AUTO: 360 X10*3/UL (ref 150–450)
PO2 BLDV: 38 MM HG (ref 35–45)
PO2 BLDV: 44 MM HG (ref 35–45)
POTASSIUM BLDV-SCNC: 4.1 MMOL/L (ref 3.5–5.3)
POTASSIUM SERPL-SCNC: 3.9 MMOL/L (ref 3.4–5.1)
POTASSIUM SERPL-SCNC: 4.1 MMOL/L (ref 3.4–5.1)
PROT SERPL-MCNC: 7.4 G/DL (ref 5.9–7.9)
PROT UR STRIP.AUTO-MCNC: NEGATIVE MG/DL
RBC # BLD AUTO: 4.15 X10*6/UL (ref 4–5.2)
RBC # UR STRIP.AUTO: NEGATIVE /UL
SAO2 % BLDV: 71 % (ref 45–75)
SAO2 % BLDV: 74 % (ref 45–75)
SARS-COV-2 RNA RESP QL NAA+PROBE: DETECTED
SODIUM BLDV-SCNC: 133 MMOL/L (ref 136–145)
SODIUM SERPL-SCNC: 130 MMOL/L (ref 133–145)
SODIUM SERPL-SCNC: 131 MMOL/L (ref 133–145)
SP GR UR STRIP.AUTO: 1.01
TROPONIN T SERPL-MCNC: 27 NG/L
TROPONIN T SERPL-MCNC: 28 NG/L
UROBILINOGEN UR STRIP.AUTO-MCNC: NORMAL MG/DL
WBC # BLD AUTO: 8.7 X10*3/UL (ref 4.4–11.3)

## 2024-03-03 PROCEDURE — 2550000001 HC RX 255 CONTRASTS: Performed by: STUDENT IN AN ORGANIZED HEALTH CARE EDUCATION/TRAINING PROGRAM

## 2024-03-03 PROCEDURE — 83880 ASSAY OF NATRIURETIC PEPTIDE: CPT | Performed by: PHYSICIAN ASSISTANT

## 2024-03-03 PROCEDURE — 93010 ELECTROCARDIOGRAM REPORT: CPT | Performed by: INTERNAL MEDICINE

## 2024-03-03 PROCEDURE — 71275 CT ANGIOGRAPHY CHEST: CPT

## 2024-03-03 PROCEDURE — 84484 ASSAY OF TROPONIN QUANT: CPT | Performed by: PHYSICIAN ASSISTANT

## 2024-03-03 PROCEDURE — 85025 COMPLETE CBC W/AUTO DIFF WBC: CPT | Performed by: PHYSICIAN ASSISTANT

## 2024-03-03 PROCEDURE — 82330 ASSAY OF CALCIUM: CPT | Performed by: EMERGENCY MEDICINE

## 2024-03-03 PROCEDURE — 80053 COMPREHEN METABOLIC PANEL: CPT | Performed by: PHYSICIAN ASSISTANT

## 2024-03-03 PROCEDURE — 2500000002 HC RX 250 W HCPCS SELF ADMINISTERED DRUGS (ALT 637 FOR MEDICARE OP, ALT 636 FOR OP/ED): Performed by: PHYSICIAN ASSISTANT

## 2024-03-03 PROCEDURE — 83735 ASSAY OF MAGNESIUM: CPT | Performed by: STUDENT IN AN ORGANIZED HEALTH CARE EDUCATION/TRAINING PROGRAM

## 2024-03-03 PROCEDURE — 71046 X-RAY EXAM CHEST 2 VIEWS: CPT

## 2024-03-03 PROCEDURE — 36415 COLL VENOUS BLD VENIPUNCTURE: CPT | Performed by: STUDENT IN AN ORGANIZED HEALTH CARE EDUCATION/TRAINING PROGRAM

## 2024-03-03 PROCEDURE — 81003 URINALYSIS AUTO W/O SCOPE: CPT | Performed by: PHYSICIAN ASSISTANT

## 2024-03-03 PROCEDURE — 84132 ASSAY OF SERUM POTASSIUM: CPT | Performed by: PHYSICIAN ASSISTANT

## 2024-03-03 PROCEDURE — 2500000004 HC RX 250 GENERAL PHARMACY W/ HCPCS (ALT 636 FOR OP/ED): Performed by: PHYSICIAN ASSISTANT

## 2024-03-03 PROCEDURE — 36415 COLL VENOUS BLD VENIPUNCTURE: CPT | Performed by: PHYSICIAN ASSISTANT

## 2024-03-03 PROCEDURE — 96374 THER/PROPH/DIAG INJ IV PUSH: CPT

## 2024-03-03 PROCEDURE — 84100 ASSAY OF PHOSPHORUS: CPT | Performed by: STUDENT IN AN ORGANIZED HEALTH CARE EDUCATION/TRAINING PROGRAM

## 2024-03-03 PROCEDURE — 93005 ELECTROCARDIOGRAM TRACING: CPT

## 2024-03-03 PROCEDURE — 82805 BLOOD GASES W/O2 SATURATION: CPT | Performed by: STUDENT IN AN ORGANIZED HEALTH CARE EDUCATION/TRAINING PROGRAM

## 2024-03-03 PROCEDURE — 85300 ANTITHROMBIN III ACTIVITY: CPT | Performed by: PHYSICIAN ASSISTANT

## 2024-03-03 PROCEDURE — 99285 EMERGENCY DEPT VISIT HI MDM: CPT | Mod: 25

## 2024-03-03 PROCEDURE — 2060000001 HC INTERMEDIATE ICU ROOM DAILY

## 2024-03-03 PROCEDURE — 99222 1ST HOSP IP/OBS MODERATE 55: CPT | Performed by: INTERNAL MEDICINE

## 2024-03-03 PROCEDURE — 83605 ASSAY OF LACTIC ACID: CPT | Performed by: EMERGENCY MEDICINE

## 2024-03-03 PROCEDURE — 87636 SARSCOV2 & INF A&B AMP PRB: CPT | Performed by: PHYSICIAN ASSISTANT

## 2024-03-03 PROCEDURE — 2500000004 HC RX 250 GENERAL PHARMACY W/ HCPCS (ALT 636 FOR OP/ED): Performed by: INTERNAL MEDICINE

## 2024-03-03 PROCEDURE — 2500000001 HC RX 250 WO HCPCS SELF ADMINISTERED DRUGS (ALT 637 FOR MEDICARE OP): Performed by: INTERNAL MEDICINE

## 2024-03-03 RX ORDER — PANTOPRAZOLE SODIUM 40 MG/10ML
40 INJECTION, POWDER, LYOPHILIZED, FOR SOLUTION INTRAVENOUS
Status: DISCONTINUED | OUTPATIENT
Start: 2024-03-04 | End: 2024-03-08 | Stop reason: HOSPADM

## 2024-03-03 RX ORDER — NAPROXEN SODIUM 220 MG/1
81 TABLET, FILM COATED ORAL DAILY
Status: DISCONTINUED | OUTPATIENT
Start: 2024-03-03 | End: 2024-03-08 | Stop reason: HOSPADM

## 2024-03-03 RX ORDER — CLOPIDOGREL BISULFATE 75 MG/1
75 TABLET ORAL DAILY
Status: DISCONTINUED | OUTPATIENT
Start: 2024-03-03 | End: 2024-03-08 | Stop reason: HOSPADM

## 2024-03-03 RX ORDER — URSODIOL 300 MG/1
300 CAPSULE ORAL 2 TIMES DAILY
Status: DISCONTINUED | OUTPATIENT
Start: 2024-03-03 | End: 2024-03-08 | Stop reason: HOSPADM

## 2024-03-03 RX ORDER — POLYETHYLENE GLYCOL 3350 17 G/17G
17 POWDER, FOR SOLUTION ORAL DAILY
Status: DISCONTINUED | OUTPATIENT
Start: 2024-03-03 | End: 2024-03-08 | Stop reason: HOSPADM

## 2024-03-03 RX ORDER — PANTOPRAZOLE SODIUM 40 MG/1
40 TABLET, DELAYED RELEASE ORAL
Status: DISCONTINUED | OUTPATIENT
Start: 2024-03-04 | End: 2024-03-08 | Stop reason: HOSPADM

## 2024-03-03 RX ORDER — GUAIFENESIN 600 MG/1
600 TABLET, EXTENDED RELEASE ORAL EVERY 12 HOURS PRN
Status: DISCONTINUED | OUTPATIENT
Start: 2024-03-03 | End: 2024-03-08 | Stop reason: HOSPADM

## 2024-03-03 RX ORDER — ATORVASTATIN CALCIUM 40 MG/1
40 TABLET, FILM COATED ORAL NIGHTLY
Status: DISCONTINUED | OUTPATIENT
Start: 2024-03-03 | End: 2024-03-08 | Stop reason: HOSPADM

## 2024-03-03 RX ORDER — GUAIFENESIN/DEXTROMETHORPHAN 100-10MG/5
5 SYRUP ORAL EVERY 4 HOURS PRN
Status: DISCONTINUED | OUTPATIENT
Start: 2024-03-03 | End: 2024-03-08 | Stop reason: HOSPADM

## 2024-03-03 RX ORDER — ALPRAZOLAM 0.25 MG/1
0.25 TABLET ORAL 3 TIMES DAILY
Status: DISCONTINUED | OUTPATIENT
Start: 2024-03-03 | End: 2024-03-06

## 2024-03-03 RX ORDER — AZELASTINE 1 MG/ML
2 SPRAY, METERED NASAL 2 TIMES DAILY
Status: DISCONTINUED | OUTPATIENT
Start: 2024-03-03 | End: 2024-03-08 | Stop reason: HOSPADM

## 2024-03-03 RX ORDER — METOPROLOL SUCCINATE 100 MG/1
100 TABLET, EXTENDED RELEASE ORAL 2 TIMES DAILY
Status: DISCONTINUED | OUTPATIENT
Start: 2024-03-03 | End: 2024-03-03

## 2024-03-03 RX ORDER — METOPROLOL SUCCINATE 100 MG/1
100 TABLET, EXTENDED RELEASE ORAL 2 TIMES DAILY
Status: DISCONTINUED | OUTPATIENT
Start: 2024-03-03 | End: 2024-03-08 | Stop reason: HOSPADM

## 2024-03-03 RX ORDER — ONDANSETRON 4 MG/1
4 TABLET, ORALLY DISINTEGRATING ORAL EVERY 8 HOURS PRN
Status: DISCONTINUED | OUTPATIENT
Start: 2024-03-03 | End: 2024-03-08 | Stop reason: HOSPADM

## 2024-03-03 RX ORDER — TALC
3 POWDER (GRAM) TOPICAL NIGHTLY
Status: DISCONTINUED | OUTPATIENT
Start: 2024-03-03 | End: 2024-03-08 | Stop reason: HOSPADM

## 2024-03-03 RX ORDER — ONDANSETRON HYDROCHLORIDE 2 MG/ML
4 INJECTION, SOLUTION INTRAVENOUS EVERY 8 HOURS PRN
Status: DISCONTINUED | OUTPATIENT
Start: 2024-03-03 | End: 2024-03-08 | Stop reason: HOSPADM

## 2024-03-03 RX ORDER — HEPARIN SODIUM 5000 [USP'U]/ML
5000 INJECTION, SOLUTION INTRAVENOUS; SUBCUTANEOUS EVERY 8 HOURS SCHEDULED
Status: DISCONTINUED | OUTPATIENT
Start: 2024-03-03 | End: 2024-03-08 | Stop reason: HOSPADM

## 2024-03-03 RX ORDER — LOSARTAN POTASSIUM 100 MG/1
100 TABLET ORAL DAILY
Status: DISCONTINUED | OUTPATIENT
Start: 2024-03-03 | End: 2024-03-08 | Stop reason: HOSPADM

## 2024-03-03 RX ORDER — ATENOLOL 50 MG/1
100 TABLET ORAL DAILY
Status: DISCONTINUED | OUTPATIENT
Start: 2024-03-03 | End: 2024-03-03

## 2024-03-03 RX ORDER — DOCUSATE SODIUM 100 MG/1
100 CAPSULE, LIQUID FILLED ORAL 2 TIMES DAILY
Status: DISCONTINUED | OUTPATIENT
Start: 2024-03-03 | End: 2024-03-03

## 2024-03-03 RX ORDER — ACETAMINOPHEN 325 MG/1
975 TABLET ORAL ONCE
Status: DISCONTINUED | OUTPATIENT
Start: 2024-03-03 | End: 2024-03-08 | Stop reason: HOSPADM

## 2024-03-03 RX ORDER — SIMVASTATIN 20 MG/1
40 TABLET, FILM COATED ORAL DAILY
Status: DISCONTINUED | OUTPATIENT
Start: 2024-03-03 | End: 2024-03-03

## 2024-03-03 RX ORDER — FLUTICASONE PROPIONATE 50 MCG
2 SPRAY, SUSPENSION (ML) NASAL DAILY
Status: DISCONTINUED | OUTPATIENT
Start: 2024-03-04 | End: 2024-03-08 | Stop reason: HOSPADM

## 2024-03-03 RX ORDER — ACETAMINOPHEN 650 MG/1
650 SUPPOSITORY RECTAL EVERY 4 HOURS PRN
Status: DISCONTINUED | OUTPATIENT
Start: 2024-03-03 | End: 2024-03-08 | Stop reason: HOSPADM

## 2024-03-03 RX ORDER — ACETAMINOPHEN 160 MG/5ML
650 SOLUTION ORAL EVERY 4 HOURS PRN
Status: DISCONTINUED | OUTPATIENT
Start: 2024-03-03 | End: 2024-03-08 | Stop reason: HOSPADM

## 2024-03-03 RX ORDER — ACETAMINOPHEN 325 MG/1
650 TABLET ORAL EVERY 4 HOURS PRN
Status: DISCONTINUED | OUTPATIENT
Start: 2024-03-03 | End: 2024-03-08 | Stop reason: HOSPADM

## 2024-03-03 RX ORDER — DOCUSATE SODIUM 100 MG/1
100 CAPSULE, LIQUID FILLED ORAL 2 TIMES DAILY
Status: DISCONTINUED | OUTPATIENT
Start: 2024-03-03 | End: 2024-03-08 | Stop reason: HOSPADM

## 2024-03-03 RX ORDER — MIRTAZAPINE 15 MG/1
15 TABLET, FILM COATED ORAL NIGHTLY
Status: DISCONTINUED | OUTPATIENT
Start: 2024-03-03 | End: 2024-03-08 | Stop reason: HOSPADM

## 2024-03-03 RX ORDER — AMLODIPINE BESYLATE 10 MG/1
10 TABLET ORAL DAILY
Status: DISCONTINUED | OUTPATIENT
Start: 2024-03-03 | End: 2024-03-08 | Stop reason: HOSPADM

## 2024-03-03 RX ORDER — ATENOLOL 50 MG/1
100 TABLET ORAL DAILY
Status: DISCONTINUED | OUTPATIENT
Start: 2024-03-03 | End: 2024-03-04

## 2024-03-03 RX ORDER — GABAPENTIN 300 MG/1
300 CAPSULE ORAL 2 TIMES DAILY
Status: DISCONTINUED | OUTPATIENT
Start: 2024-03-03 | End: 2024-03-08 | Stop reason: HOSPADM

## 2024-03-03 RX ORDER — SODIUM CHLORIDE 9 MG/ML
75 INJECTION, SOLUTION INTRAVENOUS CONTINUOUS
Status: DISCONTINUED | OUTPATIENT
Start: 2024-03-03 | End: 2024-03-05

## 2024-03-03 RX ADMIN — SODIUM CHLORIDE 500 ML: 900 INJECTION, SOLUTION INTRAVENOUS at 13:10

## 2024-03-03 RX ADMIN — INSULIN HUMAN 5 UNITS: 100 INJECTION, SOLUTION PARENTERAL at 14:39

## 2024-03-03 RX ADMIN — SODIUM CHLORIDE 75 ML/HR: 900 INJECTION, SOLUTION INTRAVENOUS at 20:17

## 2024-03-03 RX ADMIN — IOHEXOL 75 ML: 350 INJECTION, SOLUTION INTRAVENOUS at 14:47

## 2024-03-03 RX ADMIN — SODIUM CHLORIDE 1000 ML: 900 INJECTION, SOLUTION INTRAVENOUS at 14:41

## 2024-03-03 RX ADMIN — AMLODIPINE BESYLATE 10 MG: 10 TABLET ORAL at 20:32

## 2024-03-03 RX ADMIN — ASPIRIN 81 MG CHEWABLE TABLET 81 MG: 81 TABLET CHEWABLE at 20:32

## 2024-03-03 RX ADMIN — METOPROLOL SUCCINATE 100 MG: 100 TABLET, EXTENDED RELEASE ORAL at 20:32

## 2024-03-03 SDOH — HEALTH STABILITY: PHYSICAL HEALTH: ON AVERAGE, HOW MANY DAYS PER WEEK DO YOU ENGAGE IN MODERATE TO STRENUOUS EXERCISE (LIKE A BRISK WALK)?: 0 DAYS

## 2024-03-03 SDOH — SOCIAL STABILITY: SOCIAL INSECURITY
WITHIN THE LAST YEAR, HAVE YOU BEEN KICKED, HIT, SLAPPED, OR OTHERWISE PHYSICALLY HURT BY YOUR PARTNER OR EX-PARTNER?: NO

## 2024-03-03 SDOH — SOCIAL STABILITY: SOCIAL NETWORK: HOW OFTEN DO YOU GET TOGETHER WITH FRIENDS OR RELATIVES?: MORE THAN THREE TIMES A WEEK

## 2024-03-03 SDOH — ECONOMIC STABILITY: HOUSING INSECURITY
IN THE LAST 12 MONTHS, WAS THERE A TIME WHEN YOU DID NOT HAVE A STEADY PLACE TO SLEEP OR SLEPT IN A SHELTER (INCLUDING NOW)?: NO

## 2024-03-03 SDOH — HEALTH STABILITY: PHYSICAL HEALTH: ON AVERAGE, HOW MANY MINUTES DO YOU ENGAGE IN EXERCISE AT THIS LEVEL?: 0 MIN

## 2024-03-03 SDOH — ECONOMIC STABILITY: FOOD INSECURITY: WITHIN THE PAST 12 MONTHS, YOU WORRIED THAT YOUR FOOD WOULD RUN OUT BEFORE YOU GOT MONEY TO BUY MORE.: NEVER TRUE

## 2024-03-03 SDOH — HEALTH STABILITY: MENTAL HEALTH: HOW MANY STANDARD DRINKS CONTAINING ALCOHOL DO YOU HAVE ON A TYPICAL DAY?: PATIENT DOES NOT DRINK

## 2024-03-03 SDOH — ECONOMIC STABILITY: INCOME INSECURITY: IN THE PAST 12 MONTHS, HAS THE ELECTRIC, GAS, OIL, OR WATER COMPANY THREATENED TO SHUT OFF SERVICE IN YOUR HOME?: NO

## 2024-03-03 SDOH — HEALTH STABILITY: MENTAL HEALTH: HOW OFTEN DO YOU HAVE 6 OR MORE DRINKS ON ONE OCCASION?: NEVER

## 2024-03-03 SDOH — SOCIAL STABILITY: SOCIAL INSECURITY: WITHIN THE LAST YEAR, HAVE YOU BEEN AFRAID OF YOUR PARTNER OR EX-PARTNER?: NO

## 2024-03-03 SDOH — ECONOMIC STABILITY: INCOME INSECURITY: IN THE LAST 12 MONTHS, WAS THERE A TIME WHEN YOU WERE NOT ABLE TO PAY THE MORTGAGE OR RENT ON TIME?: NO

## 2024-03-03 SDOH — SOCIAL STABILITY: SOCIAL NETWORK
DO YOU BELONG TO ANY CLUBS OR ORGANIZATIONS SUCH AS CHURCH GROUPS UNIONS, FRATERNAL OR ATHLETIC GROUPS, OR SCHOOL GROUPS?: YES

## 2024-03-03 SDOH — SOCIAL STABILITY: SOCIAL INSECURITY
WITHIN THE LAST YEAR, HAVE TO BEEN RAPED OR FORCED TO HAVE ANY KIND OF SEXUAL ACTIVITY BY YOUR PARTNER OR EX-PARTNER?: NO

## 2024-03-03 SDOH — ECONOMIC STABILITY: TRANSPORTATION INSECURITY
IN THE PAST 12 MONTHS, HAS LACK OF TRANSPORTATION KEPT YOU FROM MEETINGS, WORK, OR FROM GETTING THINGS NEEDED FOR DAILY LIVING?: NO

## 2024-03-03 SDOH — ECONOMIC STABILITY: INCOME INSECURITY: HOW HARD IS IT FOR YOU TO PAY FOR THE VERY BASICS LIKE FOOD, HOUSING, MEDICAL CARE, AND HEATING?: NOT HARD AT ALL

## 2024-03-03 SDOH — SOCIAL STABILITY: SOCIAL NETWORK: HOW OFTEN DO YOU ATTEND CHURCH OR RELIGIOUS SERVICES?: MORE THAN 4 TIMES PER YEAR

## 2024-03-03 SDOH — SOCIAL STABILITY: SOCIAL INSECURITY: WITHIN THE LAST YEAR, HAVE YOU BEEN HUMILIATED OR EMOTIONALLY ABUSED IN OTHER WAYS BY YOUR PARTNER OR EX-PARTNER?: NO

## 2024-03-03 SDOH — SOCIAL STABILITY: SOCIAL NETWORK
IN A TYPICAL WEEK, HOW MANY TIMES DO YOU TALK ON THE PHONE WITH FAMILY, FRIENDS, OR NEIGHBORS?: MORE THAN THREE TIMES A WEEK

## 2024-03-03 SDOH — ECONOMIC STABILITY: FOOD INSECURITY: WITHIN THE PAST 12 MONTHS, THE FOOD YOU BOUGHT JUST DIDN'T LAST AND YOU DIDN'T HAVE MONEY TO GET MORE.: NEVER TRUE

## 2024-03-03 SDOH — SOCIAL STABILITY: SOCIAL NETWORK: ARE YOU MARRIED, WIDOWED, DIVORCED, SEPARATED, NEVER MARRIED, OR LIVING WITH A PARTNER?: WIDOWED

## 2024-03-03 SDOH — ECONOMIC STABILITY: TRANSPORTATION INSECURITY
IN THE PAST 12 MONTHS, HAS THE LACK OF TRANSPORTATION KEPT YOU FROM MEDICAL APPOINTMENTS OR FROM GETTING MEDICATIONS?: NO

## 2024-03-03 SDOH — HEALTH STABILITY: MENTAL HEALTH
STRESS IS WHEN SOMEONE FEELS TENSE, NERVOUS, ANXIOUS, OR CAN'T SLEEP AT NIGHT BECAUSE THEIR MIND IS TROUBLED. HOW STRESSED ARE YOU?: NOT AT ALL

## 2024-03-03 SDOH — HEALTH STABILITY: MENTAL HEALTH: HOW OFTEN DO YOU HAVE A DRINK CONTAINING ALCOHOL?: NEVER

## 2024-03-03 SDOH — ECONOMIC STABILITY: HOUSING INSECURITY: IN THE LAST 12 MONTHS, HOW MANY PLACES HAVE YOU LIVED?: 1

## 2024-03-03 SDOH — SOCIAL STABILITY: SOCIAL NETWORK: HOW OFTEN DO YOU ATTENT MEETINGS OF THE CLUB OR ORGANIZATION YOU BELONG TO?: MORE THAN 4 TIMES PER YEAR

## 2024-03-03 ASSESSMENT — ENCOUNTER SYMPTOMS
FLANK PAIN: 0
DIFFICULTY URINATING: 0
WEAKNESS: 0
DIZZINESS: 0
CHEST TIGHTNESS: 0
EYE REDNESS: 0
PALPITATIONS: 1
CONFUSION: 0
FACIAL ASYMMETRY: 0
MYALGIAS: 0
BRUISES/BLEEDS EASILY: 0
DECREASED CONCENTRATION: 0
ACTIVITY CHANGE: 0
COUGH: 0
ADENOPATHY: 0
FREQUENCY: 0
BLOOD IN STOOL: 0
DIAPHORESIS: 0
NUMBNESS: 0
CONSTIPATION: 0
AGITATION: 0
HEMATURIA: 0
NECK PAIN: 0
EYE PAIN: 0
WOUND: 0
SEIZURES: 0
NECK STIFFNESS: 0
RECTAL PAIN: 0
SINUS PAIN: 0
APNEA: 0
POLYDIPSIA: 0
WHEEZING: 0
SORE THROAT: 0
TROUBLE SWALLOWING: 0
PHOTOPHOBIA: 0
FATIGUE: 0
EYE ITCHING: 0
FEVER: 0
SINUS PRESSURE: 0
JOINT SWELLING: 0
DYSURIA: 0
DYSPHORIC MOOD: 0
EYE DISCHARGE: 0
UNEXPECTED WEIGHT CHANGE: 0
NERVOUS/ANXIOUS: 0
ARTHRALGIAS: 0
DIARRHEA: 0
SPEECH DIFFICULTY: 0
HALLUCINATIONS: 0
SHORTNESS OF BREATH: 0
HYPERACTIVE: 0
RHINORRHEA: 0
CHILLS: 0
TREMORS: 0
STRIDOR: 0
COLOR CHANGE: 0
NAUSEA: 0
SLEEP DISTURBANCE: 0
APPETITE CHANGE: 0
HEADACHES: 0
FACIAL SWELLING: 0
VOICE CHANGE: 0
ABDOMINAL PAIN: 0
BACK PAIN: 0
POLYPHAGIA: 0
ANAL BLEEDING: 0
CHOKING: 0
VOMITING: 0
LIGHT-HEADEDNESS: 0
ABDOMINAL DISTENTION: 0

## 2024-03-03 ASSESSMENT — COLUMBIA-SUICIDE SEVERITY RATING SCALE - C-SSRS
6. HAVE YOU EVER DONE ANYTHING, STARTED TO DO ANYTHING, OR PREPARED TO DO ANYTHING TO END YOUR LIFE?: NO
1. IN THE PAST MONTH, HAVE YOU WISHED YOU WERE DEAD OR WISHED YOU COULD GO TO SLEEP AND NOT WAKE UP?: NO
2. HAVE YOU ACTUALLY HAD ANY THOUGHTS OF KILLING YOURSELF?: NO

## 2024-03-03 ASSESSMENT — PAIN SCALES - GENERAL: PAINLEVEL_OUTOF10: 0 - NO PAIN

## 2024-03-03 ASSESSMENT — LIFESTYLE VARIABLES
EVER FELT BAD OR GUILTY ABOUT YOUR DRINKING: NO
HAVE YOU EVER FELT YOU SHOULD CUT DOWN ON YOUR DRINKING: NO
SKIP TO QUESTIONS 9-10: 1
AUDIT-C TOTAL SCORE: 0
EVER HAD A DRINK FIRST THING IN THE MORNING TO STEADY YOUR NERVES TO GET RID OF A HANGOVER: NO
HAVE PEOPLE ANNOYED YOU BY CRITICIZING YOUR DRINKING: NO

## 2024-03-03 ASSESSMENT — PAIN DESCRIPTION - PROGRESSION: CLINICAL_PROGRESSION: NOT CHANGED

## 2024-03-03 ASSESSMENT — PAIN - FUNCTIONAL ASSESSMENT: PAIN_FUNCTIONAL_ASSESSMENT: 0-10

## 2024-03-03 NOTE — ED PROVIDER NOTES
HPI   Chief Complaint   Patient presents with    Rapid Heart Rate       89-year-old female presented emergency department with a chief complaint of shortness of breath, rapid heart rate, general not feeling well.  She has a current diagnosis of COVID-19.  She was started on some medications by her primary doctor over the last several days.  Has nausea without vomiting.  Normal bowel movements.  No abdominal discomfort.  Denies chest pain or pressure.  Nontoxic-appearing.  No other complaint.  Heart Rate is in the 140 range on arrival                          Josue Coma Scale Score: 15                     Patient History   Past Medical History:   Diagnosis Date    Gastroparesis     Gastroparesis    Other conditions influencing health status     Acute Myocardial Infarction    Other conditions influencing health status     Pulmonary Embolism    Other conditions influencing health status     Total Occlusion Of The Femoral Artery    Other conditions influencing health status     Pulmonary Embolism    Peripheral vascular angioplasty status     Status post angioplasty    Personal history of other diseases of the circulatory system     History of hypertension    Personal history of other diseases of the circulatory system     History of peripheral vascular disease    Personal history of other diseases of the digestive system     History of esophageal reflux    Personal history of other diseases of the nervous system and sense organs     History of cataract    Personal history of other endocrine, nutritional and metabolic disease 01/27/2017    History of diabetic neuropathy    Personal history of other endocrine, nutritional and metabolic disease     History of type 2 diabetes mellitus    Personal history of other specified conditions 06/17/2019    History of urinary frequency    Unspecified secondary cataract     Secondary cataract     Past Surgical History:   Procedure Laterality Date    CHOLECYSTECTOMY  04/01/2013     Cholecystectomy    CORONARY ANGIOPLASTY  04/01/2013    PTCA    MR HEAD ANGIO WO IV CONTRAST  10/15/2014    MR HEAD ANGIO WO IV CONTRAST [facility]    MR HEAD ANGIO WO IV CONTRAST  1/18/2020    MR HEAD ANGIO WO IV CONTRAST [facility]    MR NECK ANGIO WO IV CONTRAST  1/18/2020    MR NECK ANGIO WO IV CONTRAST [facility]    OTHER SURGICAL HISTORY  04/01/2013    Femoral Artery Exploration    OTHER SURGICAL HISTORY  04/01/2013    Endoscopic Retrograde Cholangiopancreatography (ERCP)    OTHER SURGICAL HISTORY  10/30/2018    Cataract Extraction With Insertion Of Intraocular Lens    OTHER SURGICAL HISTORY  10/30/2018    Iridotomy By YAG Laser    OTHER SURGICAL HISTORY  06/24/2020    YAG laser capsulotomy     Family History   Problem Relation Name Age of Onset    Anxiety disorder Mother      Heart disease Father       Social History     Tobacco Use    Smoking status: Never     Passive exposure: Never    Smokeless tobacco: Never   Substance Use Topics    Alcohol use: Never    Drug use: Never       Physical Exam   ED Triage Vitals [03/03/24 1249]   Temperature Heart Rate Respirations BP   36.5 °C (97.7 °F) (!) 145 (!) 28 129/66      Pulse Ox Temp Source Heart Rate Source Patient Position   97 % Temporal Monitor;Brachial Sitting      BP Location FiO2 (%)     Left arm --       Physical Exam  Vitals and nursing note reviewed.   Constitutional:       Appearance: Normal appearance.   HENT:      Head: Normocephalic.      Nose: Nose normal.   Cardiovascular:      Rate and Rhythm: Normal rate and regular rhythm.   Pulmonary:      Effort: Pulmonary effort is normal.   Abdominal:      General: Abdomen is flat.   Musculoskeletal:         General: Normal range of motion.   Skin:     General: Skin is warm.   Neurological:      General: No focal deficit present.      Mental Status: She is alert and oriented to person, place, and time.   Psychiatric:         Mood and Affect: Mood normal.         ED Course & MDM    ED Course as of 03/03/24 1627   Sun Mar 03, 2024   1256 EKG Time:1255  EKG Interpretation time:1256  EKG Interpretation: EKG shows sinus tachycardia with a rate of 137 bpm, left axis deviation, QTc 443, nonspecific ST changes but no evidence of STEMI.    EKG was interpreted by myself independently [JL]      ED Course User Index  [JL] Sky Coley DO         Diagnoses as of 03/03/24 1627   COVID-19   Dehydration   Hyperglycemia   Tachycardia       Medical Decision Making  I have seen and evaluated this patient.  The attending physician has also seen and evaluated this patient.  Vital signs, laboratory testing and diagnostic images if applicable have been reviewed.  All laboratory and imaging is interpreted by myself unless otherwise stated.  Radiology studies are also formally interpreted by radiologist.    Patient without significant leukocytosis, baseline anemia, metabolic panel renal insufficiency slightly worse than baseline.  No significant electrolyte abnormality.  She does have an anion gap of 19 with a bicarb of 15 and a glucose of 335 raising suspicion for diabetic ketoacidosis.  VBG obtained showing pH within normal limits.  Patient was given 1.5 L of fluid and 5 units insulin.  Metabolic panel repeated.  Anion gap has resolved, the bicarb is improving, the glucose is improving.  Patient's heart rate is now below 100 after IV fluids.  CT angiogram shows no evidence of a pulmonary embolism.  Will be admitted to the hospital for further hydration and correction of metabolic abnormality.    I have seen and evaluated this patient and independently provided 31 minutes of nonconcurrent critical care time. This does not include separately billable procedures. Patient with high potential for deterioration, required frequent monitoring and assessment.    Labs Reviewed  CBC WITH AUTO DIFFERENTIAL - Abnormal     WBC                           8.7                    nRBC                          0.0                     RBC                           4.15                   Hemoglobin                    12.9                   Hematocrit                    39.4                   MCV                           95                     MCH                           31.1                   MCHC                          32.7                   RDW                           12.9                   Platelets                     360                    Neutrophils %                 75.5                   Immature Granulocytes %, Automated   1.0 (*)                Lymphocytes %                 16.9                   Monocytes %                   5.8                    Eosinophils %                 0.3                    Basophils %                   0.5                    Neutrophils Absolute          6.54 (*)               Immature Granulocytes Absolute, Au*   0.09                   Lymphocytes Absolute          1.46                   Monocytes Absolute            0.50                   Eosinophils Absolute          0.03                   Basophils Absolute            0.04                COMPREHENSIVE METABOLIC PANEL - Abnormal     Glucose                       335 (*)                Sodium                        130 (*)                Potassium                     4.1                    Chloride                      95 (*)                 Bicarbonate                   15 (*)                 Urea Nitrogen                 27 (*)                 Creatinine                    1.60                   eGFR                          30 (*)                 Calcium                       9.4                    Albumin                       3.7                    Alkaline Phosphatase          151 (*)                Total Protein                 7.4                    AST                           22                     Bilirubin, Total              <0.2                   ALT                           14                     Anion Gap                      >19 (*)             N-TERMINAL PROBNP - Abnormal     PROBNP                        939 (*)                  Narrative: Reference ranges are based on clinical submission data. These ranges represent the 95th percentile of normal cut-off points. As NT Pro- BNP values approach 1000 pg/ml, clinical symptoms are more likely associated with CHF.  D-DIMER, NON VTE - Abnormal     D-Dimer Non VTE, Quant (mg/L FEU)   2.05 (*)                 Narrative: THROMBOEMBOLIC EVENTS CANNOT BE EXCLUDED SOLELY ON THE BASIS OF THE D-DIMER LEVEL BEING WITHIN THE NORMAL REFERENCE RANGE. D-DIMER LEVELS LESS THAN 0.5 MG/L FEU IN CONJUNCTION WITH A LOW CLINICAL PROBABILITY HAVE AN EXCELLENT NEGATIVE PREDICTIVE VALUE IN EXCLUDING A DIAGNOSIS OF PULMONARY EMBOLUS (PE) OR DEEP VEIN THROMBOSIS (DVT). ELEVATED D-DIMER LEVELS ARE NOT SPECIFIC TO PE OR DVT, AND MAY BE SEEN IN PATIENTS WITH DIC, ADVANCED AGE, PREGNANCY, MALIGNANCY, LIVER DISEASE, INFECTION, AND INFLAMMATORY CONDITIONS AMONG OTHERS. D-DIMER LEVELS MAY BE DECREASED IN PATIENTS RECEIVING ANTI-COAGULATION THERAPY.  SARS-COV-2 AND INFLUENZA A/B PCR - Abnormal     Flu A Result                                         Flu B Result                                         Coronavirus 2019, PCR         Detected (*)                 Narrative: This assay has received FDA Emergency Use Authorization (EUA) and  is only authorized for the duration of time that circumstances exist to justify the authorization of the emergency use of in vitro diagnostic tests for the detection of SARS-CoV-2 virus and/or diagnosis of COVID-19 infection under section 564(b)(1) of the Act, 21 U.S.C. 360bbb-3(b)(1). Testing for SARS-CoV-2 is only recommended for patients who meet current clinical and/or epidemiological criteria as defined by federal, state, or local public health directives. This assay is an in vitro diagnostic nucleic acid amplification test for the qualitative detection of SARS-CoV-2, Influenza A, and  Influenza B from nasopharyngeal specimens and has been validated for use at [facility]. Negative results do not preclude COVID-19 infections or Influenza A/B infections, and should not be used as the sole basis for diagnosis, treatment, or other management decisions. If Influenza A/B and RSV PCR results are negative, testing for Parainfluenza virus, Adenovirus and Metapneumovirus is routinely performed for [facility] [place], and is available on other patients by placing an add-on request.   URINALYSIS WITH REFLEX CULTURE AND MICROSCOPIC - Abnormal     Color, Urine                  Colorless (*)               Appearance, Urine             Clear                  Specific Gravity, Urine       1.014                  pH, Urine                     5.0                    Protein, Urine                NEGATIVE                Glucose, Urine                Normal                 Blood, Urine                  NEGATIVE                Ketones, Urine                NEGATIVE                Bilirubin, Urine              NEGATIVE                Urobilinogen, Urine           Normal                 Nitrite, Urine                NEGATIVE                Leukocyte Esterase, Urine     NEGATIVE             SERIAL TROPONIN, INITIAL ([facility]) - Abnormal     Troponin T, High Sensitivity   28 (*)              SERIAL TROPONIN,  2 HOUR ([facility]) - Abnormal     Troponin T, High Sensitivity   27 (*)              BLOOD GAS VENOUS FULL PANEL - Abnormal     POCT pH, Venous               7.35                   POCT pCO2, Venous             34 (*)                 POCT pO2, Venous              44                     POCT SO2, Venous              74                     POCT Oxy Hemoglobin, Venous   71.6                   POCT Hematocrit Calculated, Venous   35.0 (*)               POCT Sodium, Venous           133 (*)                POCT Potassium, Venous        4.1                    POCT Chloride,  Venous         103                    POCT Ionized Calicum, Venous   1.31                   POCT Glucose, Venous          262 (*)                POCT Lactate, Venous          2.1 (*)                POCT Base Excess, Venous      -6.0 (*)               POCT HCO3 Calculated, Venous   18.8 (*)               POCT Hemoglobin, Venous       11.8 (*)               POCT Anion Gap, Venous        15.0                   Patient Temperature           37.0                   FiO2                          100                 BLOOD GAS VENOUS - Abnormal     POCT pH, Venous               7.33                   POCT pCO2, Venous             35 (*)                 POCT pO2, Venous              38                     POCT SO2, Venous              71                     POCT Oxy Hemoglobin, Venous   69.4                   POCT Base Excess, Venous      -6.6 (*)               POCT HCO3 Calculated, Venous   18.5 (*)               Patient Temperature           37.0                   FiO2                          100                 BASIC METABOLIC PANEL - Abnormal     Glucose                       244 (*)                Sodium                        131 (*)                Potassium                     3.9                    Chloride                      100                    Bicarbonate                   17 (*)                 Urea Nitrogen                 27 (*)                 Creatinine                    1.50                   eGFR                          32 (*)                 Calcium                       8.4 (*)                Anion Gap                     14                  BLOOD GAS LACTIC ACID, VENOUS - Abnormal     POCT Lactate, Venous          2.5 (*)             MAGNESIUM - Normal     Magnesium                     1.60                PHOSPHORUS - Normal     Phosphorus                    3.5                 URINALYSIS WITH REFLEX CULTURE AND MICROSCOPIC       Narrative: The following orders were created for panel order  Urinalysis with Reflex Culture and Microscopic.                Procedure                               Abnormality         Status                                   ---------                               -----------         ------                                   Urinalysis with Reflex C...[[id number]]  Abnormal            Final result                             Extra Urine Gray Tube[[id number]]                            In process                                               Please view results for these tests on the individual orders.  TROPONIN T SERIES, HIGH SENSITIVITY (0, 2 HR, 6 HR)       Narrative: The following orders were created for panel order Troponin T Series, High Sensitivity (0, 2HR, 6HR).                Procedure                               Abnormality         Status                                   ---------                               -----------         ------                                   Serial Troponin, Initial...[[id number]]  Abnormal            Final result                             Serial Troponin, 2 Hour ...[[id number]]  Abnormal            Final result                             Serial Troponin, 6 Hour ...[[id number]]                                                                               Please view results for these tests on the individual orders.  EXTRA URINE GRAY TUBE  SERIAL TROPONIN, 6 HOUR ([facility])  CT angio chest for pulmonary embolism   Final Result    1. No evidence for pulmonary embolus with characterization    peripherally limited secondary to timing of the contrast bolus          2. Bilateral basilar linear scar and chronic appearing basilar    interstitial change          3. Moderate compression fracture along the T12 vertebral body in    particular inferior endplate appearing more conspicuous from CT dated    08/07/2023.                MACRO:    None          Signed by: Keerthi Coates 3/3/2024 3:38 PM    Dictation workstation:   [id number]     XR chest 2  views   Final Result    1. Emphysematous change without acute process.          2. Endplate compression along the inferior endplate of the T12    vertebral body as seen on CT from 08/07/2023          Signed by: Keerthi Coates 3/3/2024 1:42 PM    Dictation workstation:   [id number]     Medications  acetaminophen (Tylenol) tablet 975 mg (975 mg oral Not Given 3/3/24 1305)  sodium chloride 0.9% infusion (has no administration in time range)  sodium chloride 0.9 % bolus 500 mL (0 mL intravenous Stopped 3/3/24 1340)  insulin regular (HumuLIN R,NovoLIN R) injection 5 Units (5 Units intravenous Given 3/3/24 1439)  sodium chloride 0.9 % bolus 1,000 mL (0 mL intravenous Stopped 3/3/24 1511)  iohexol (OMNIPaque) 350 mg iodine/mL solution 75 mL (75 mL intravenous Given 3/3/24 1447)  New Prescriptions  No medications on file            Procedure  Procedures     Sky Presley PA-C  03/03/24 2003

## 2024-03-03 NOTE — ED PROVIDER NOTES
HPI   Chief Complaint   Patient presents with    Rapid Heart Rate       Patient is a 89-year-old female that presents emergency department for evaluation of palpitations, rapid heart rate.  Patient states that she recently was diagnosed with COVID just over a week ago when she started having symptoms of sinus congestion, fatigue.  She states that she has been feeling very rundown over the last week with fatigue and decreased appetite.  She states that she has not been eating very well and has not been taking her diabetic medications.  She did take Paxlovid for COVID-19 however started having heart racing beginning yesterday.  She also noted her blood pressure was low yesterday and did not take her blood pressure medication at home.  She denies fever, chest pain, abdominal pain, vomiting, dysuria, hematuria, change in bowel habits.      History provided by:  Patient                      Josue Coma Scale Score: 15                     Patient History   Past Medical History:   Diagnosis Date    Gastroparesis     Gastroparesis    Other conditions influencing health status     Acute Myocardial Infarction    Other conditions influencing health status     Pulmonary Embolism    Other conditions influencing health status     Total Occlusion Of The Femoral Artery    Other conditions influencing health status     Pulmonary Embolism    Peripheral vascular angioplasty status     Status post angioplasty    Personal history of other diseases of the circulatory system     History of hypertension    Personal history of other diseases of the circulatory system     History of peripheral vascular disease    Personal history of other diseases of the digestive system     History of esophageal reflux    Personal history of other diseases of the nervous system and sense organs     History of cataract    Personal history of other endocrine, nutritional and metabolic disease 01/27/2017    History of diabetic neuropathy    Personal history  of other endocrine, nutritional and metabolic disease     History of type 2 diabetes mellitus    Personal history of other specified conditions 06/17/2019    History of urinary frequency    Unspecified secondary cataract     Secondary cataract     Past Surgical History:   Procedure Laterality Date    CHOLECYSTECTOMY  04/01/2013    Cholecystectomy    CORONARY ANGIOPLASTY  04/01/2013    PTCA    MR HEAD ANGIO WO IV CONTRAST  10/15/2014    MR HEAD ANGIO WO IV CONTRAST [facility]    MR HEAD ANGIO WO IV CONTRAST  1/18/2020    MR HEAD ANGIO WO IV CONTRAST [facility]    MR NECK ANGIO WO IV CONTRAST  1/18/2020    MR NECK ANGIO WO IV CONTRAST [facility]    OTHER SURGICAL HISTORY  04/01/2013    Femoral Artery Exploration    OTHER SURGICAL HISTORY  04/01/2013    Endoscopic Retrograde Cholangiopancreatography (ERCP)    OTHER SURGICAL HISTORY  10/30/2018    Cataract Extraction With Insertion Of Intraocular Lens    OTHER SURGICAL HISTORY  10/30/2018    Iridotomy By YAG Laser    OTHER SURGICAL HISTORY  06/24/2020    YAG laser capsulotomy     Family History   Problem Relation Name Age of Onset    Anxiety disorder Mother      Heart disease Father       Social History     Tobacco Use    Smoking status: Never     Passive exposure: Never    Smokeless tobacco: Never   Substance Use Topics    Alcohol use: Never    Drug use: Never       Physical Exam   ED Triage Vitals [03/03/24 1249]   Temperature Heart Rate Respirations BP   36.5 °C (97.7 °F) (!) 145 (!) 28 129/66      Pulse Ox Temp Source Heart Rate Source Patient Position   97 % Temporal Monitor;Brachial Sitting      BP Location FiO2 (%)     Left arm --       Physical Exam  Vitals and nursing note reviewed.   Constitutional:       General: She is not in acute distress.     Appearance: She is ill-appearing.   HENT:      Head: Normocephalic and atraumatic.      Mouth/Throat:      Mouth: Mucous membranes are dry.   Eyes:      Extraocular Movements: Extraocular  movements intact.      Pupils: Pupils are equal, round, and reactive to light.   Cardiovascular:      Rate and Rhythm: Regular rhythm. Tachycardia present.   Pulmonary:      Effort: Pulmonary effort is normal. No respiratory distress.      Breath sounds: No stridor. No wheezing or rhonchi.   Abdominal:      General: Abdomen is flat.      Tenderness: There is no abdominal tenderness. There is no guarding or rebound.   Musculoskeletal:         General: No swelling.   Skin:     General: Skin is warm and dry.   Neurological:      General: No focal deficit present.      Mental Status: She is alert.       Recent Results (from the past 24 hour(s))   Basic Metabolic Panel    Collection Time: 03/06/24  4:53 AM   Result Value Ref Range    Glucose 170 (H) 65 - 99 mg/dL    Sodium 135 133 - 145 mmol/L    Potassium 4.0 3.4 - 5.1 mmol/L    Chloride 104 97 - 107 mmol/L    Bicarbonate 18 (L) 24 - 31 mmol/L    Urea Nitrogen 21 8 - 25 mg/dL    Creatinine 1.30 0.40 - 1.60 mg/dL    eGFR 38 (L) >60 mL/min/1.73m*2    Calcium 8.9 8.5 - 10.4 mg/dL    Anion Gap 13 <=19 mmol/L         ED Course & [facility]   ED Course as of 03/06/24 2204   [place] Mar 03, 2024   1256 EKG Time:1255  EKG Interpretation time:1256  EKG Interpretation: EKG shows sinus tachycardia with a rate of 137 bpm, left axis deviation, QTc 443, nonspecific ST changes but no evidence of STEMI.    EKG was interpreted by myself independently [JL]      ED Course User Index  [JL] Sky Coley DO         Diagnoses as of 03/06/24 2204   COVID-19   Dehydration   Hyperglycemia   Tachycardia       Medical Decision Making  Patient is a 89-year-old female that presents emergency room for evaluation of palpitations, rapid heart rate and fatigue.  Patient uncomfortable appearing but in no obvious distress on initial presentation.  She does have significant tachycardia with a heart rate of 145 however remainder vital signs are stable including normal blood pressure.  She is awake, alert.  Blood  work ordered including CBC, CMP, troponin, D-dimer, magnesium, phosphorus.  Troponin mildly elevated at 28 however EKG just shows sinus tachycardia at this time.  She does have normal white count of 8.7 and did test positive for COVID-19 consistent with known infection.  Patient is found to be hyperglycemic and has an anion gap greater than 19 however this is likely combination of dehydration, hyperglycemia due to noncompliance and recent infection.  Will repeat blood work at this time prior to determining disposition.  Patient given IV fluids and her heart rate is improving at this time.  D-dimer also elevated at 2.05 and a CT angio of the chest was ordered to evaluate for pulmonary embolism.  CT angio shows no evidence of pulmonary embolism.  Repeat blood work does show improved glucose and an anion gap that has resolved at 14 with a normal pH of 7.33.  I do feel that patient's hyperglycemia and lab work is more consistent with dehydration and poor oral intake due to her COVID-19 infection.  Patient will be admitted for continued IV hydration and further evaluation.     Patient was seen by both myself and advanced practitioner.  I personally saw the patient and made/approved the management plan and take responsibility for the patient management         Procedure  Procedures     Sky Coley, DO  03/03/24 4127       Sky Coley, DO  03/06/24 9535

## 2024-03-03 NOTE — PROGRESS NOTES
"Catie Groves is a 89 y.o. female on day 0 of admission presenting with COVID-19.    RNCC met with patient and her two sons, Pato and Rashawn. She lives alone in a multi-level house.  Her bedroom and bath are on the main level. She has a cane that she uses occaisionally due to \"a bad hip\". She still drives, short distances, \"to [employer] or my hair appointment\". Her sons are close by and very involved. PCP is Dr JUAN Peguero.  VERITO 03/06/2024  Patient and her sons agree that there are no needs for discharge.       Kellen Zapata RN      "

## 2024-03-03 NOTE — ED TRIAGE NOTES
Patient states she tested positive for covid last Sunday, patient states she also had a sinus infection, she was given medication for both. Patient states after receiving meds for covid her heart rate has been elevated and her bp has been low

## 2024-03-04 ENCOUNTER — APPOINTMENT (OUTPATIENT)
Dept: CARDIOLOGY | Facility: HOSPITAL | Age: 89
DRG: 308 | End: 2024-03-04
Payer: MEDICARE

## 2024-03-04 LAB
ALBUMIN SERPL-MCNC: 3.3 G/DL (ref 3.5–5)
ALP BLD-CCNC: 134 U/L (ref 35–125)
ALT SERPL-CCNC: 15 U/L (ref 5–40)
ANION GAP SERPL CALC-SCNC: 13 MMOL/L
AORTIC VALVE MEAN GRADIENT: 3 MMHG
AORTIC VALVE PEAK VELOCITY: 1.16 M/S
AST SERPL-CCNC: 26 U/L (ref 5–40)
ATRIAL RATE: 137 BPM
AV PEAK GRADIENT: 5.4 MMHG
AVA (PEAK VEL): 2.08 CM2
AVA (VTI): 1.82 CM2
BILIRUB SERPL-MCNC: <0.2 MG/DL (ref 0.1–1.2)
BUN SERPL-MCNC: 17 MG/DL (ref 8–25)
CALCIUM SERPL-MCNC: 9 MG/DL (ref 8.5–10.4)
CHLORIDE SERPL-SCNC: 107 MMOL/L (ref 97–107)
CO2 SERPL-SCNC: 19 MMOL/L (ref 24–31)
CREAT SERPL-MCNC: 1.2 MG/DL (ref 0.4–1.6)
EGFRCR SERPLBLD CKD-EPI 2021: 42 ML/MIN/1.73M*2
EJECTION FRACTION APICAL 4 CHAMBER: 60.4
EJECTION FRACTION: 60 %
ERYTHROCYTE [DISTWIDTH] IN BLOOD BY AUTOMATED COUNT: 13.1 % (ref 11.5–14.5)
GLUCOSE BLD MANUAL STRIP-MCNC: 104 MG/DL (ref 74–99)
GLUCOSE BLD MANUAL STRIP-MCNC: 161 MG/DL (ref 74–99)
GLUCOSE SERPL-MCNC: 96 MG/DL (ref 65–99)
HCT VFR BLD AUTO: 38.2 % (ref 36–46)
HGB BLD-MCNC: 12.2 G/DL (ref 12–16)
HOLD SPECIMEN: NORMAL
LEFT ATRIUM VOLUME AREA LENGTH INDEX BSA: 27.1 ML/M2
LEFT VENTRICLE INTERNAL DIMENSION DIASTOLE: 4.02 CM (ref 3.5–6)
LEFT VENTRICULAR OUTFLOW TRACT DIAMETER: 1.9 CM
MCH RBC QN AUTO: 30.7 PG (ref 26–34)
MCHC RBC AUTO-ENTMCNC: 31.9 G/DL (ref 32–36)
MCV RBC AUTO: 96 FL (ref 80–100)
MITRAL VALVE E/A RATIO: 0.46
MITRAL VALVE E/E' RATIO: 13.08
NRBC BLD-RTO: 0 /100 WBCS (ref 0–0)
P AXIS: 75 DEGREES
P OFFSET: 195 MS
P ONSET: 150 MS
PLATELET # BLD AUTO: 310 X10*3/UL (ref 150–450)
POTASSIUM SERPL-SCNC: 4 MMOL/L (ref 3.4–5.1)
PR INTERVAL: 134 MS
PROT SERPL-MCNC: 6.8 G/DL (ref 5.9–7.9)
Q ONSET: 217 MS
QRS COUNT: 22 BEATS
QRS DURATION: 64 MS
QT INTERVAL: 294 MS
QTC CALCULATION(BAZETT): 443 MS
QTC FREDERICIA: 387 MS
R AXIS: -56 DEGREES
RBC # BLD AUTO: 3.98 X10*6/UL (ref 4–5.2)
RIGHT VENTRICLE FREE WALL PEAK S': 8.7 CM/S
RIGHT VENTRICLE PEAK SYSTOLIC PRESSURE: 22.4 MMHG
SODIUM SERPL-SCNC: 139 MMOL/L (ref 133–145)
T AXIS: 53 DEGREES
T OFFSET: 364 MS
TRICUSPID ANNULAR PLANE SYSTOLIC EXCURSION: 2 CM
TROPONIN T SERPL-MCNC: 32 NG/L
VENTRICULAR RATE: 137 BPM
WBC # BLD AUTO: 7.5 X10*3/UL (ref 4.4–11.3)

## 2024-03-04 PROCEDURE — 99232 SBSQ HOSP IP/OBS MODERATE 35: CPT | Performed by: INTERNAL MEDICINE

## 2024-03-04 PROCEDURE — 97166 OT EVAL MOD COMPLEX 45 MIN: CPT | Mod: GO

## 2024-03-04 PROCEDURE — 93306 TTE W/DOPPLER COMPLETE: CPT

## 2024-03-04 PROCEDURE — 96372 THER/PROPH/DIAG INJ SC/IM: CPT | Performed by: INTERNAL MEDICINE

## 2024-03-04 PROCEDURE — 2500000002 HC RX 250 W HCPCS SELF ADMINISTERED DRUGS (ALT 637 FOR MEDICARE OP, ALT 636 FOR OP/ED): Performed by: INTERNAL MEDICINE

## 2024-03-04 PROCEDURE — 97162 PT EVAL MOD COMPLEX 30 MIN: CPT | Mod: GP

## 2024-03-04 PROCEDURE — 97535 SELF CARE MNGMENT TRAINING: CPT | Mod: GO

## 2024-03-04 PROCEDURE — 93306 TTE W/DOPPLER COMPLETE: CPT | Performed by: INTERNAL MEDICINE

## 2024-03-04 PROCEDURE — 2060000001 HC INTERMEDIATE ICU ROOM DAILY

## 2024-03-04 PROCEDURE — 36415 COLL VENOUS BLD VENIPUNCTURE: CPT | Performed by: INTERNAL MEDICINE

## 2024-03-04 PROCEDURE — 2500000004 HC RX 250 GENERAL PHARMACY W/ HCPCS (ALT 636 FOR OP/ED): Performed by: INTERNAL MEDICINE

## 2024-03-04 PROCEDURE — 80053 COMPREHEN METABOLIC PANEL: CPT | Performed by: INTERNAL MEDICINE

## 2024-03-04 PROCEDURE — 82947 ASSAY GLUCOSE BLOOD QUANT: CPT

## 2024-03-04 PROCEDURE — 99222 1ST HOSP IP/OBS MODERATE 55: CPT | Performed by: INTERNAL MEDICINE

## 2024-03-04 PROCEDURE — 93005 ELECTROCARDIOGRAM TRACING: CPT

## 2024-03-04 PROCEDURE — 85027 COMPLETE CBC AUTOMATED: CPT | Performed by: INTERNAL MEDICINE

## 2024-03-04 PROCEDURE — 2500000001 HC RX 250 WO HCPCS SELF ADMINISTERED DRUGS (ALT 637 FOR MEDICARE OP): Performed by: INTERNAL MEDICINE

## 2024-03-04 PROCEDURE — 84484 ASSAY OF TROPONIN QUANT: CPT | Performed by: PHYSICIAN ASSISTANT

## 2024-03-04 PROCEDURE — 97116 GAIT TRAINING THERAPY: CPT | Mod: GP

## 2024-03-04 RX ADMIN — AMLODIPINE BESYLATE 10 MG: 10 TABLET ORAL at 10:07

## 2024-03-04 RX ADMIN — HEPARIN SODIUM 5000 UNITS: 5000 INJECTION, SOLUTION INTRAVENOUS; SUBCUTANEOUS at 14:08

## 2024-03-04 RX ADMIN — HEPARIN SODIUM 5000 UNITS: 5000 INJECTION, SOLUTION INTRAVENOUS; SUBCUTANEOUS at 21:22

## 2024-03-04 RX ADMIN — Medication 3 MG: at 21:23

## 2024-03-04 RX ADMIN — HEPARIN SODIUM 5000 UNITS: 5000 INJECTION, SOLUTION INTRAVENOUS; SUBCUTANEOUS at 01:10

## 2024-03-04 RX ADMIN — Medication 3 MG: at 01:10

## 2024-03-04 RX ADMIN — AZELASTINE HYDROCHLORIDE 2 SPRAY: 137 SPRAY, METERED NASAL at 21:23

## 2024-03-04 RX ADMIN — SODIUM CHLORIDE 75 ML/HR: 900 INJECTION, SOLUTION INTRAVENOUS at 17:26

## 2024-03-04 RX ADMIN — ATORVASTATIN CALCIUM 40 MG: 40 TABLET, FILM COATED ORAL at 21:22

## 2024-03-04 RX ADMIN — URSODIOL 300 MG: 300 CAPSULE ORAL at 21:23

## 2024-03-04 RX ADMIN — LOSARTAN POTASSIUM 100 MG: 100 TABLET, FILM COATED ORAL at 10:07

## 2024-03-04 RX ADMIN — GABAPENTIN 300 MG: 300 CAPSULE ORAL at 01:10

## 2024-03-04 RX ADMIN — HEPARIN SODIUM 5000 UNITS: 5000 INJECTION, SOLUTION INTRAVENOUS; SUBCUTANEOUS at 08:03

## 2024-03-04 RX ADMIN — MIRTAZAPINE 15 MG: 15 TABLET, FILM COATED ORAL at 01:10

## 2024-03-04 RX ADMIN — CLOPIDOGREL BISULFATE 75 MG: 75 TABLET ORAL at 01:10

## 2024-03-04 RX ADMIN — GABAPENTIN 300 MG: 300 CAPSULE ORAL at 10:07

## 2024-03-04 RX ADMIN — GABAPENTIN 300 MG: 300 CAPSULE ORAL at 21:23

## 2024-03-04 RX ADMIN — METOPROLOL SUCCINATE 100 MG: 100 TABLET, EXTENDED RELEASE ORAL at 10:07

## 2024-03-04 RX ADMIN — URSODIOL 300 MG: 300 CAPSULE ORAL at 10:14

## 2024-03-04 RX ADMIN — ATORVASTATIN CALCIUM 40 MG: 40 TABLET, FILM COATED ORAL at 01:10

## 2024-03-04 RX ADMIN — PANTOPRAZOLE SODIUM 40 MG: 40 TABLET, DELAYED RELEASE ORAL at 08:03

## 2024-03-04 RX ADMIN — ALPRAZOLAM 0.25 MG: 0.25 TABLET ORAL at 14:08

## 2024-03-04 RX ADMIN — ALPRAZOLAM 0.25 MG: 0.25 TABLET ORAL at 01:10

## 2024-03-04 SDOH — SOCIAL STABILITY: SOCIAL INSECURITY: DOES ANYONE TRY TO KEEP YOU FROM HAVING/CONTACTING OTHER FRIENDS OR DOING THINGS OUTSIDE YOUR HOME?: NO

## 2024-03-04 SDOH — SOCIAL STABILITY: SOCIAL INSECURITY: ABUSE: ADULT

## 2024-03-04 SDOH — SOCIAL STABILITY: SOCIAL INSECURITY: HAVE YOU HAD THOUGHTS OF HARMING ANYONE ELSE?: NO

## 2024-03-04 SDOH — SOCIAL STABILITY: SOCIAL INSECURITY: DO YOU FEEL ANYONE HAS EXPLOITED OR TAKEN ADVANTAGE OF YOU FINANCIALLY OR OF YOUR PERSONAL PROPERTY?: NO

## 2024-03-04 SDOH — SOCIAL STABILITY: SOCIAL INSECURITY: HAS ANYONE EVER THREATENED TO HURT YOUR FAMILY OR YOUR PETS?: NO

## 2024-03-04 SDOH — SOCIAL STABILITY: SOCIAL INSECURITY: ARE THERE ANY APPARENT SIGNS OF INJURIES/BEHAVIORS THAT COULD BE RELATED TO ABUSE/NEGLECT?: NO

## 2024-03-04 SDOH — SOCIAL STABILITY: SOCIAL INSECURITY: ARE YOU OR HAVE YOU BEEN THREATENED OR ABUSED PHYSICALLY, EMOTIONALLY, OR SEXUALLY BY ANYONE?: NO

## 2024-03-04 SDOH — SOCIAL STABILITY: SOCIAL INSECURITY: DO YOU FEEL UNSAFE GOING BACK TO THE PLACE WHERE YOU ARE LIVING?: NO

## 2024-03-04 ASSESSMENT — COGNITIVE AND FUNCTIONAL STATUS - GENERAL
MOVING TO AND FROM BED TO CHAIR: A LITTLE
MOBILITY SCORE: 17
DRESSING REGULAR LOWER BODY CLOTHING: A LOT
MOVING TO AND FROM BED TO CHAIR: A LITTLE
DAILY ACTIVITIY SCORE: 22
WALKING IN HOSPITAL ROOM: A LITTLE
PERSONAL GROOMING: A LITTLE
WALKING IN HOSPITAL ROOM: A LITTLE
TURNING FROM BACK TO SIDE WHILE IN FLAT BAD: A LITTLE
MOVING FROM LYING ON BACK TO SITTING ON SIDE OF FLAT BED WITH BEDRAILS: A LITTLE
CLIMB 3 TO 5 STEPS WITH RAILING: A LOT
TURNING FROM BACK TO SIDE WHILE IN FLAT BAD: A LITTLE
DRESSING REGULAR UPPER BODY CLOTHING: A LITTLE
DAILY ACTIVITIY SCORE: 17
DRESSING REGULAR UPPER BODY CLOTHING: A LITTLE
STANDING UP FROM CHAIR USING ARMS: A LITTLE
HELP NEEDED FOR BATHING: A LOT
TOILETING: A LITTLE
MOBILITY SCORE: 17
MOVING FROM LYING ON BACK TO SITTING ON SIDE OF FLAT BED WITH BEDRAILS: A LITTLE
WALKING IN HOSPITAL ROOM: A LITTLE
TOILETING: A LITTLE
DRESSING REGULAR LOWER BODY CLOTHING: A LITTLE
CLIMB 3 TO 5 STEPS WITH RAILING: A LITTLE
STANDING UP FROM CHAIR USING ARMS: A LITTLE
MOBILITY SCORE: 22
HELP NEEDED FOR BATHING: A LOT
DRESSING REGULAR LOWER BODY CLOTHING: A LOT
PERSONAL GROOMING: A LITTLE
DAILY ACTIVITIY SCORE: 17
TOILETING: A LITTLE
PATIENT BASELINE BEDBOUND: NO
CLIMB 3 TO 5 STEPS WITH RAILING: A LOT

## 2024-03-04 ASSESSMENT — ACTIVITIES OF DAILY LIVING (ADL)
PATIENT'S MEMORY ADEQUATE TO SAFELY COMPLETE DAILY ACTIVITIES?: YES
ASSISTIVE_DEVICE: EYEGLASSES
TOILETING: INDEPENDENT
FEEDING YOURSELF: INDEPENDENT
HEARING - LEFT EAR: FUNCTIONAL
ADL_ASSISTANCE: INDEPENDENT
BATHING_ASSISTANCE: MINIMAL
HEARING - RIGHT EAR: FUNCTIONAL
HOME_MANAGEMENT_TIME_ENTRY: 12
GROOMING: INDEPENDENT
ADEQUATE_TO_COMPLETE_ADL: YES
ADL_ASSISTANCE: INDEPENDENT
WALKS IN HOME: INDEPENDENT
DRESSING YOURSELF: INDEPENDENT
JUDGMENT_ADEQUATE_SAFELY_COMPLETE_DAILY_ACTIVITIES: YES
BATHING: INDEPENDENT

## 2024-03-04 ASSESSMENT — LIFESTYLE VARIABLES
HOW MANY STANDARD DRINKS CONTAINING ALCOHOL DO YOU HAVE ON A TYPICAL DAY: PATIENT DOES NOT DRINK
SKIP TO QUESTIONS 9-10: 1
AUDIT-C TOTAL SCORE: 0
HOW OFTEN DO YOU HAVE 6 OR MORE DRINKS ON ONE OCCASION: NEVER
AUDIT-C TOTAL SCORE: 0
HOW OFTEN DO YOU HAVE A DRINK CONTAINING ALCOHOL: NEVER

## 2024-03-04 ASSESSMENT — PATIENT HEALTH QUESTIONNAIRE - PHQ9
1. LITTLE INTEREST OR PLEASURE IN DOING THINGS: NOT AT ALL
SUM OF ALL RESPONSES TO PHQ9 QUESTIONS 1 & 2: 0
2. FEELING DOWN, DEPRESSED OR HOPELESS: NOT AT ALL

## 2024-03-04 ASSESSMENT — PAIN SCALES - GENERAL
PAINLEVEL_OUTOF10: 0 - NO PAIN

## 2024-03-04 ASSESSMENT — PAIN - FUNCTIONAL ASSESSMENT
PAIN_FUNCTIONAL_ASSESSMENT: 0-10

## 2024-03-04 NOTE — PROGRESS NOTES
Physical Therapy    Physical Therapy Evaluation & Treatment    Patient Name: Catie Groves  MRN: [id number]  Today's Date: 3/4/2024   Time Calculation  Start Time: 1132  Stop Time: 1159  Time Calculation (min): 27 min    Assessment/Plan   PT Assessment  PT Assessment Results: Decreased strength, Decreased range of motion, Impaired balance, Decreased endurance, Decreased mobility, Decreased coordination, Decreased cognition, Impaired judgement, Decreased safety awareness, Impaired vision, Impaired hearing, Impaired sensation, Decreased skin integrity, Pain  Rehab Prognosis: Good  Evaluation/Treatment Tolerance: Patient tolerated treatment well, Patient limited by fatigue  Medical Staff Made Aware: Yes  Strengths: Support of Caregivers  Barriers to Participation: Comorbidities  End of Session Communication: Bedside nurse  Assessment Comment: The patient is a 89 y.o. female admitted to the hospital for weakness and found to have COVID-19. The patient currently requires Ashish for transfers and ambulation with HHA. The patient has experienced a functional decline and would continue to benefit from skilled therapy services to address residual deficits.  End of Session Patient Position: Up in chair, Alarm on   IP OR SWING BED PT PLAN  Inpatient or Swing Bed: Inpatient  PT Plan  Treatment/Interventions: Bed mobility, Transfer training, Gait training, Balance training, Neuromuscular re-education, Strengthening, Endurance training, Range of motion, Therapeutic exercise, Therapeutic activity, Home exercise program  PT Plan: Skilled PT  PT Frequency: 4 times per week  PT Discharge Recommendations: Moderate intensity level of continued care  Equipment Recommended upon Discharge: Wheeled walker  PT Recommended Transfer Status: Assist x1  PT - OK to Discharge: Yes (to next appropriate level of care)      Subjective     General Visit Information:  General  Reason for Referral: mobility impairment  Referred By: JUAN Peguero MD  Past  Medical History Relevant to Rehab: Gastroparesis, Acute Myocardial Infarction, Pulmonary Embolism, Total Occlusion Of The Femoral Artery,  Peripheral vascular angioplasty, hypertension, peripheral vascular disease esophageal reflux, cataract diabetic neuropathy type 2 diabetes mellitus  Co-Treatment: OT  Co-Treatment Reason: need for second skilled therapist due to limited patient endurance, safe patient handling  Prior to Session Communication: Bedside nurse  Patient Position Received: Bed, 3 rail up, Alarm on  Preferred Learning Style: verbal  General Comment: Pt is a 89 yo woman admit with c/o heart palpitations and weakness, found to have COVID 19.  Home Living:  Home Living  Type of Home: House  Lives With: Alone  Home Adaptive Equipment: Cane  Home Layout: Two level, Laundry in basement  Home Access: Stairs to enter with rails  Entrance Stairs-Rails: Right  Entrance Stairs-Number of Steps: 2  Bathroom Shower/Tub: Tub/shower unit  Home Living Comments: laundry in basement, full flight with rail, son calls daily  Prior Level of Function:  Prior Function Per Pt/Caregiver Report  Level of [place]: Independent with ADLs and functional transfers, Independent with homemaking with ambulation  Receives Help From: Other (Comment) (son)  ADL Assistance: Independent  Homemaking Assistance: Independent  Ambulatory Assistance: Independent  Prior Function Comments: Pt reports independent and driving  Precautions:  Precautions  Hearing/Visual Limitations: mild [place]; glasses  Medical Precautions: Fall precautions, Infection precautions (COVID+)    Objective   Pain:  Pain Assessment  Pain Assessment: 0-10  Pain Score: 0 - No pain  Cognition:  Cognition  Overall Cognitive Status: Impaired  Orientation Level: Oriented X4  Following Commands: Follows one step commands with increased time  Safety Judgment: Decreased awareness of need for safety precautions    General Assessments:  General Observation  General Observation:  pleasant; on room air    Activity Tolerance  Endurance: Decreased tolerance for upright activites  Rate of Perceived Exertion (RPE): 3/10    Strength  Strength Comments: BLE grossly 3+/5  Coordination  Coordination Comment: Increased time and effort for mobility    Postural Control  Posture Comment: kyphotic posture; narrow DEB    Static Sitting Balance  Static Sitting-Balance Support: Feet supported  Static Sitting-Level of Assistance: Distant supervision  Static Sitting-Comment/Number of Minutes: EOb for approx. 5 min    Static Standing Balance  Static Standing-Balance Support: Bilateral upper extremity supported (HHA)  Static Standing-Level of Assistance: Minimum assistance  Static Standing-Comment/Number of Minutes: forward flexed posture  Dynamic Standing Balance  Dynamic Standing-Balance Support: Bilateral upper extremity supported (HHA)  Dynamic Standing-Balance: Forward lean  Dynamic Standing-Comments: Narrow DEB and shuffled-like gait; Ashish for stability  Functional Assessments:  Bed Mobility  Bed Mobility: Yes  Bed Mobility 1  Bed Mobility 1: Supine to sitting  Level of Assistance 1: Minimum assistance  Bed Mobility Comments 1: HOB elevated to 30 deg; use of bed rails    Transfers  Transfer: Yes  Transfer 1  Transfer From 1: Bed to, Stand to  Transfer to 1: Stand, Toilet  Technique 1: Sit to stand, Stand to sit  Transfer Level of Assistance 1: Hand held assistance, Minimum assistance  Trials/Comments 1: VCs for safe sequencing  Transfers 2  Transfer From 2: Toilet to, Stand to  Transfer to 2: Stand, Chair with arms  Technique 2: Stand to sit, Sit to stand  Transfer Level of Assistance 2: Minimum assistance  Trials/Comments 2: VCs for use of grab bar    Ambulation/Gait Training  Ambulation/Gait Training Performed: Yes  Ambulation/Gait Training 1  Surface 1: Level tile  Assistance 1: Hand held assistance, Minimum assistance  Quality of Gait 1: Narrow base of support, Shuffling gait  Comments/Distance (ft) 1:  15ftx2 with HHA and Ashish. Decreased adeline with forward flexed posture; david flexed knees and shuffled-like gait pattern  Extremity/Trunk Assessments:  RLE   RLE :  (AROM limited at end range; grossly 3+/5)  LLE   LLE :  (AROM limited at end range; grossly 3+/5)  Treatments:  Bed Mobility  Bed Mobility: Yes  Bed Mobility 1  Bed Mobility 1: Supine to sitting  Level of Assistance 1: Minimum assistance  Bed Mobility Comments 1: HOB elevated to 30 deg; use of bed rails    Ambulation/Gait Training  Ambulation/Gait Training Performed: Yes  Ambulation/Gait Training 1  Surface 1: Level tile  Assistance 1: Hand held assistance, Minimum assistance  Quality of Gait 1: Narrow base of support, Shuffling gait  Comments/Distance (ft) 1: 15ftx2 with HHA and Ashish. Decreased adeline with forward flexed posture; david flexed knees and shuffled-like gait pattern  Transfers  Transfer: Yes  Transfer 1  Transfer From 1: Bed to, Stand to  Transfer to 1: Stand, Toilet  Technique 1: Sit to stand, Stand to sit  Transfer Level of Assistance 1: Hand held assistance, Minimum assistance  Trials/Comments 1: VCs for safe sequencing  Transfers 2  Transfer From 2: Toilet to, Stand to  Transfer to 2: Stand, Chair with arms  Technique 2: Stand to sit, Sit to stand  Transfer Level of Assistance 2: Minimum assistance  Trials/Comments 2: VCs for use of grab bar  Outcome Measures:  [facility] Basic Mobility  Turning from your back to your side while in a flat bed without using bedrails: A little  Moving from lying on your back to sitting on the side of a flat bed without using bedrails: A little  Moving to and from bed to chair (including a wheelchair): A little  Standing up from a chair using your arms (e.g. wheelchair or bedside chair): A little  To walk in hospital room: A little  Climbing 3-5 steps with railing: A lot  Basic Mobility - Total Score: 17    Encounter Problems       Encounter Problems (Active)       PT Problem       Strength (Progressing)        Start:  03/04/24    Expected End:  04/04/24       The patient will demonstrate an overall strength of 4/5 in BLE to assist with completion of functional mobility.           Balance (Progressing)       Start:  03/04/24    Expected End:  04/04/24       The patient will demonstrate good-/+ dynamic standing balance during activity with LRAD.          Mobility (Progressing)       Start:  03/04/24    Expected End:  04/04/24       The patient will complete functional mobility (bed mobility, transfers, etc.) at a mod indep level with LRAD by DC.           Ambulation (Progressing)       Start:  03/04/24    Expected End:  04/04/24       The patient will be able to ambulate at a mod indep level for >50ftx1 with LRAD.                 Education Documentation  Precautions, taught by Reta Mathews PT at 3/4/2024  2:22 PM.  Learner: Patient  Readiness: Acceptance  Method: Explanation  Response: Verbalizes Understanding    Mobility Training, taught by Reta Mathews PT at 3/4/2024  2:22 PM.  Learner: Patient  Readiness: Acceptance  Method: Explanation  Response: Verbalizes Understanding    Education Comments  No comments found.

## 2024-03-04 NOTE — NURSING NOTE
"Pt arrived to Unit at 0430 via cart from ED.  Pt was awake and alert.  Pt is alert and oriented x4.  Pt will be in bed for the present time.  Pt instructed and oriented to room and equipment within room.  Pt happy to be out of the ED in a \"softer bed\".    "

## 2024-03-04 NOTE — CARE PLAN
Problem: Pain  Goal: My pain/discomfort is manageable  Outcome: Progressing     Problem: Safety  Goal: Patient will be injury free during hospitalization  Outcome: Progressing  Goal: I will remain free of falls  Outcome: Progressing     Problem: Daily Care  Goal: Daily care needs are met  Outcome: Progressing     Problem: Psychosocial Needs  Goal: Demonstrates ability to cope with hospitalization/illness  Outcome: Progressing  Goal: Collaborate with me, my family, and caregiver to identify my specific goals  Outcome: Progressing  Flowsheets (Taken 3/4/2024 9549)  Cultural Requests During Hospitalization: None  Spiritual Requests During Hospitalization: Yes     Problem: Discharge Barriers  Goal: My discharge needs are met  Outcome: Progressing     Problem: Fall/Injury  Goal: Not fall by end of shift  Outcome: Progressing  Goal: Be free from injury by end of the shift  Outcome: Progressing  Goal: Verbalize understanding of personal risk factors for fall in the hospital  Outcome: Progressing   The patient's goals for the shift include  no falls or injuries    The clinical goals for the shift include VSS; free from fall and injuries    Over the shift, the patient did not make progress toward the following goals. Barriers to progression include. Recommendations to address these barriers include.

## 2024-03-04 NOTE — PROGRESS NOTES
Occupational Therapy    Evaluation/Treatment    Patient Name: Catie Groves  MRN: [id number]  : 3/10/1929  Today's Date: 24  Time Calculation  Start Time: 1134  Stop Time: 1158  Time Calculation (min): 24 min       Assessment:  OT Assessment: OT order received, chart reviewed, evaluation completed. Pt demonstrated deficits in ADLs and functional mobility, requiring min A, pt would benefit from acute OT services.  Barriers to Discharge: Decreased caregiver support  Medical Staff Made Aware: Yes  End of Session Communication: Bedside nurse  End of Session Patient Position: Up in chair, Alarm on  OT Assessment Results: Decreased ADL status, Decreased upper extremity strength, Decreased safe judgment during ADL, Decreased cognition, Decreased endurance, Decreased functional mobility, Decreased gross motor control, Decreased IADLs  Barriers to Discharge: Decreased caregiver support  Medical Staff Made Aware: Yes  Plan:  Treatment Interventions: ADL retraining, Functional transfer training, UE strengthening/ROM, Endurance training, Cognitive reorientation, Patient/family training, Equipment evaluation/education  OT Frequency: 3 times per week  OT Discharge Recommendations: Moderate intensity level of continued care  OT - OK to Discharge: Yes  Treatment Interventions: ADL retraining, Functional transfer training, UE strengthening/ROM, Endurance training, Cognitive reorientation, Patient/family training, Equipment evaluation/education    Subjective     General:   OT Received On: 24  General  Reason for Referral: activities of daily living, COVID 19  Referred By: JUAN Peguero MD  Past Medical History Relevant to Rehab: Gastroparesis, Acute Myocardial Infarction, Pulmonary Embolism, Total Occlusion Of The Femoral Artery,  Peripheral vascular angioplasty, hypertension, peripheral vascular disease esophageal reflux, cataract diabetic neuropathy type 2 diabetes mellitus  Family/Caregiver Present: No  Co-Treatment:  PT  Co-Treatment Reason: need for second skilled therapist due to limited patient endurance, safe patient handling  Prior to Session Communication: Bedside nurse  Patient Position Received: Bed, 3 rail up, Alarm on  Preferred Learning Style: verbal  General Comment: Pt is a 89 yo woman admit with c/o heart palpitations and weakness, found to have COVID 19.  Precautions:  Hearing/Visual Limitations: midlly [place], glasses  Medical Precautions: Fall precautions, Infection precautions (+COVID)    Pain:  Pain Assessment  Pain Assessment: 0-10  Pain Score: 0 - No pain    Objective   Cognition:  Overall Cognitive Status: Impaired (mildly)  Orientation Level: Oriented X4  Following Commands: Follows one step commands with repetition  Safety Judgment: Decreased awareness of need for assistance     Home Living:  Type of Home: House  Lives With: Alone  Home Adaptive Equipment: Cane  Home Layout: Two level, Laundry in basement  Home Access: Stairs to enter with rails  Entrance Stairs-Rails: Right  Entrance Stairs-Number of Steps: 2  Bathroom Shower/Tub: Tub/shower unit  Home Living Comments: laundry in basement, full flight with rail, son calls daily  Prior Function:  Level of [place]: Independent with ADLs and functional transfers, Independent with homemaking with ambulation  Receives Help From: Other (Comment) (son)  ADL Assistance: Independent  Homemaking Assistance: Independent  Ambulatory Assistance: Independent  Prior Function Comments: Pt reports independent and driving  IADL History:     ADL:  Eating Assistance: Independent  Grooming Deficit: Setup, Wash/dry hands, Wash/dry face, Teeth care (performed ADLs seated in chair post setup)  Bathing Assistance: Minimal  UE Dressing Assistance: Minimal  LE Dressing Assistance: Minimal  Toileting Assistance with Device: Minimal  Toileting Deficit: Clothing management up, Clothing management down, Perineal hygiene (Assist for clothing maangement and supervitsion for  hygiene)    Activity Tolerance:  Endurance: Decreased tolerance for upright activites  Rate of Perceived Exertion (RPE): 3/10  Functional Standing Tolerance:     Bed Mobility/Transfers: Bed Mobility  Bed Mobility: Yes  Bed Mobility 1  Bed Mobility 1: Supine to sitting  Level of Assistance 1: Minimum assistance  Bed Mobility Comments 1: HOB elevated and use of rail, min A to scoot to EOB.    Transfers  Transfer: Yes  Transfer 1  Transfer From 1: Sit to  Transfer to 1: Stand  Technique 1: Sit to stand  Transfer Level of Assistance 1: Minimum assistance, Arm in arm assistance, +2  Trials/Comments 1: pt stood from bed with arm in arm assist bilaterally. Performed functional mobility to and from bathroom in room returning to bedside chair, balance increased during mobility assist decreased to min A x1.  Transfers 2  Transfer From 2: Stand to  Transfer to 2: Toilet  Technique 2: Stand to sit, Sit to stand  Transfer Level of Assistance 2: Minimum assistance  Trials/Comments 2: cues for grab bar use  Transfers 3  Transfer From 3: Stand to  Transfer to 3: Chair with arms  Technique 3: Stand to sit  Transfer Level of Assistance 3: Minimum assistance  Trials/Comments 3: Pt transferred to chair with cues for safe hand palcement, remained with needs in reach.    Strength:  Strength Comments: 3/5 B UEs    Coordination:  Movements are Fluid and Coordinated: Yes   Hand Function:  Hand Function  Gross Grasp: Functional  Coordination: Functional  Extremities: RUE   RUE : Within Functional Limits and LUE   LUE: Within Functional Limits    Outcome Measures: [facility] Daily Activity  Putting on and taking off regular lower body clothing: A lot  Bathing (including washing, rinsing, drying): A lot  Putting on and taking off regular upper body clothing: A little  Toileting, which includes using toilet, bedpan or urinal: A little  Taking care of personal grooming such as brushing teeth: A little  Eating Meals: None  Daily Activity - Total  Score: 17    Education Documentation  Precautions, taught by Lidya Billings OT at 3/4/2024  1:57 PM.  Learner: Patient  Readiness: Acceptance  Method: Explanation  Response: Verbalizes Understanding  Comment: Educated on OT POC    Home Exercise Program, taught by Lidya Billings OT at 3/4/2024  1:57 PM.  Learner: Patient  Readiness: Acceptance  Method: Explanation  Response: Verbalizes Understanding  Comment: Educated on OT POC    ADL Training, taught by Lidya Billings OT at 3/4/2024  1:57 PM.  Learner: Patient  Readiness: Acceptance  Method: Explanation  Response: Verbalizes Understanding  Comment: Educated on OT POC    Education Comments  No comments found.      Goals:  Encounter Problems       Encounter Problems (Active)       OT Goals       ADLs (Progressing)       Start:  03/04/24    Expected End:  03/29/24       Pt will complete ADL tasks with Mod I, using AE as needed, in order to complete self-care tasks.           Transfers (Progressing)       Start:  03/04/24    Expected End:  03/29/24       Pt will perform functional mobility household distance independently           Functional Mobility (Progressing)       Start:  03/04/24    Expected End:  03/29/24       Pt will perform functional mobility household distance independently.           therapeutic exercise (Progressing)       Start:  03/04/24    Expected End:  03/29/24       Pt will perform upper body therapeutic exercises all joints/planes of motion independently to increase aerobic capacity

## 2024-03-04 NOTE — NURSING NOTE
Assumed care of pt, pt is awake in bed watching TV, HR is 83 SR on tele, pt hs no complaints of pain, on RA, bedside shift report given by ANATOLY Solorzano, call light w/in reach.

## 2024-03-04 NOTE — H&P
History Of Present Illness  Catie Groves is a 89 y.o. female presenting with palpitations and rapid heart rate.  Patient was recently diagnosed with COVID treated with Paxlovid.  Treated with antibiotic for sinus congestion and fatigue she has been doing better her cough is doing better but yesterday her blood pressure was running low she did not feel good felt nauseous she did not take her blood pressure medication , later in the day she was feeling palpitations but this morning when she woke up she was having palpitations and her heart rate was very high.  In the emergency room patient was found to be tachycardic also patient had hypoglycemia and acidosis with anion gap of 19.  Patient was given IV fluids.  CT chest no blood clot.  Repeat blood work improving glucose and anion gap.  Patient continues to have tachycardia after fluid but she is due for metoprolol dose  Past Medical History  Past Medical History:   Diagnosis Date    Gastroparesis     Gastroparesis    Other conditions influencing health status     Acute Myocardial Infarction    Other conditions influencing health status     Pulmonary Embolism    Other conditions influencing health status     Total Occlusion Of The Femoral Artery    Other conditions influencing health status     Pulmonary Embolism    Peripheral vascular angioplasty status     Status post angioplasty    Personal history of other diseases of the circulatory system     History of hypertension    Personal history of other diseases of the circulatory system     History of peripheral vascular disease    Personal history of other diseases of the digestive system     History of esophageal reflux    Personal history of other diseases of the nervous system and sense organs     History of cataract    Personal history of other endocrine, nutritional and metabolic disease 01/27/2017    History of diabetic neuropathy    Personal history of other endocrine, nutritional and metabolic disease      History of type 2 diabetes mellitus    Personal history of other specified conditions 06/17/2019    History of urinary frequency    Unspecified secondary cataract     Secondary cataract       Surgical History  Past Surgical History:   Procedure Laterality Date    CHOLECYSTECTOMY  04/01/2013    Cholecystectomy    CORONARY ANGIOPLASTY  04/01/2013    PTCA    MR HEAD ANGIO WO IV CONTRAST  10/15/2014    MR HEAD ANGIO WO IV CONTRAST [facility]    MR HEAD ANGIO WO IV CONTRAST  1/18/2020    MR HEAD ANGIO WO IV CONTRAST [facility]    MR NECK ANGIO WO IV CONTRAST  1/18/2020    MR NECK ANGIO WO IV CONTRAST [facility]    OTHER SURGICAL HISTORY  04/01/2013    Femoral Artery Exploration    OTHER SURGICAL HISTORY  04/01/2013    Endoscopic Retrograde Cholangiopancreatography (ERCP)    OTHER SURGICAL HISTORY  10/30/2018    Cataract Extraction With Insertion Of Intraocular Lens    OTHER SURGICAL HISTORY  10/30/2018    Iridotomy By YAG Laser    OTHER SURGICAL HISTORY  06/24/2020    YAG laser capsulotomy        Social History  She reports that she has never smoked. She has never been exposed to tobacco smoke. She has never used smokeless tobacco. She reports that she does not drink alcohol and does not use drugs.    Family History  Family History   Problem Relation Name Age of Onset    Anxiety disorder Mother      Heart disease Father          Allergies  Amoxicillin-pot clavulanate and Levofloxacin    Review of Systems   Constitutional:  Negative for activity change, appetite change, chills, diaphoresis, fatigue, fever and unexpected weight change.   HENT:  Negative for congestion, dental problem, drooling, ear discharge, ear pain, facial swelling, hearing loss, mouth sores, nosebleeds, postnasal drip, rhinorrhea, sinus pressure, sinus pain, sneezing, sore throat, tinnitus, trouble swallowing and voice change.    Eyes:  Negative for photophobia, pain, discharge, redness, itching and visual disturbance.    Respiratory:  Negative for apnea, cough, choking, chest tightness, shortness of breath, wheezing and stridor.    Cardiovascular:  Positive for palpitations. Negative for chest pain and leg swelling.   Gastrointestinal:  Negative for abdominal distention, abdominal pain, anal bleeding, blood in stool, constipation, diarrhea, nausea, rectal pain and vomiting.   Endocrine: Negative for cold intolerance, heat intolerance, polydipsia, polyphagia and polyuria.   Genitourinary:  Negative for decreased urine volume, difficulty urinating, dysuria, enuresis, flank pain, frequency, genital sores, hematuria and urgency.   Musculoskeletal:  Negative for arthralgias, back pain, gait problem, joint swelling, myalgias, neck pain and neck stiffness.   Skin:  Negative for color change, pallor, rash and wound.   Allergic/Immunologic: Negative for environmental allergies, food allergies and immunocompromised state.   Neurological:  Negative for dizziness, tremors, seizures, syncope, facial asymmetry, speech difficulty, weakness, light-headedness, numbness and headaches.   Hematological:  Negative for adenopathy. Does not bruise/bleed easily.   Psychiatric/Behavioral:  Negative for agitation, behavioral problems, confusion, decreased concentration, dysphoric mood, hallucinations, self-injury, sleep disturbance and suicidal ideas. The patient is not nervous/anxious and is not hyperactive.         Physical Exam  Vitals reviewed.   Constitutional:       Appearance: Normal appearance.   HENT:      Head: Normocephalic and atraumatic.      Right Ear: Tympanic membrane, ear canal and external ear normal.      Left Ear: Tympanic membrane, ear canal and external ear normal.      Nose: Nose normal.      Mouth/Throat:      Pharynx: Oropharynx is clear.   Eyes:      Extraocular Movements: Extraocular movements intact.      Conjunctiva/sclera: Conjunctivae normal.      Pupils: Pupils are equal, round, and reactive to light.   Cardiovascular:       "Rate and Rhythm: Regular rhythm. Tachycardia present.      Pulses: Normal pulses.      Heart sounds: Normal heart sounds.   Pulmonary:      Effort: Pulmonary effort is normal.      Breath sounds: Normal breath sounds.   Abdominal:      General: Abdomen is flat. Bowel sounds are normal.      Palpations: Abdomen is soft.   Musculoskeletal:      Cervical back: Normal range of motion and neck supple.   Skin:     General: Skin is warm and dry.   Neurological:      General: No focal deficit present.      Mental Status: She is alert and oriented to person, place, and time.   Psychiatric:         Mood and Affect: Mood normal.          Last Recorded Vitals  Blood pressure 133/87, pulse (!) 148, temperature 36.5 °C (97.7 °F), temperature source Temporal, resp. rate 17, height 1.626 m (5' 4\"), weight 62.6 kg (138 lb), SpO2 96 %.    Relevant Results        CT angio chest for pulmonary embolism    Result Date: 3/3/2024  Interpreted By:  Keerthi Coates, STUDY: CT ANGIO CHEST FOR PULMONARY EMBOLISM; ;  3/3/2024 3:05 pm   INDICATION: Signs/Symptoms:Tachycardia, positive D-dimer, concern for pulmonary embolism. Shortness of breath   COMPARISON: 10/25/2010   ACCESSION NUMBER(S): [id number]   ORDERING CLINICIAN: DOUGLAS WARNER   TECHNIQUE: CT angiogram performed through the chest following intravenous administration of 75 mL of Omnipaque 350. MIP reconstruction images reformatted in the coronal and sagittal projection   All CT examinations are performed with 1 or more of the following dose reduction techniques: Automated exposure control, adjustment of mA and/or kv according to patient's size, or use of iterative reconstruction techniques.   FINDINGS: Mediastinum demonstrates no lymphadenopathy. Hilar lymphadenopathy demonstrated. Esophagus is unremarkable   Heart and great vessels demonstrate ascending thoracic aorta to measure 2.8 cm. There is moderate vascular calcification of the thoracic aorta. Main pulmonary artery is " unremarkable. Central and segmental pulmonary arteries demonstrate no filling defects to suggest the possibility of pulmonary embolus. Characterization peripherally limited secondary to timing of contrast bolus.   Lung parenchyma demonstrates right and left lower lobe linear platelike atelectasis/scar. There is basilar dependent atelectasis. No airspace consolidation. No pleural effusions demonstrated.   Included portions visualized abdomen demonstrate a hypodensity with cyst in the midpole of the right kidney laterally measuring 8 mm. Left kidney visualized portions demonstrates incompletely visualized hypodensity within the midpole laterally measuring 9 mm suggesting renal cysts. Scattered portacaval and portal lymph nodes are demonstrated as seen on the remote CT with portal lymph node identified measuring 10 mm in short axis stable from the remote examination.   Visualized osseous structures demonstrate multilevel degenerative discogenic changes and exaggerated thoracic kyphosis within the spine. Multilevel endplate sclerosis demonstrated. There is a moderate compression deformity of the T12 vertebral body in particular along the inferior endplate with component of findings as seen on exam from 08/07/2023. However, the compression fracture appears more conspicuous on the current examination           1. No evidence for pulmonary embolus with characterization peripherally limited secondary to timing of the contrast bolus   2. Bilateral basilar linear scar and chronic appearing basilar interstitial change   3. Moderate compression fracture along the T12 vertebral body in particular inferior endplate appearing more conspicuous from CT dated 08/07/2023.     MACRO: None   Signed by: Keerthi Coates 3/3/2024 3:38 PM Dictation workstation:   [id number]    XR chest 2 views    Result Date: 3/3/2024  Interpreted By:  Keerthi Coates, STUDY: XR CHEST 2 VIEWS 3/3/2024 1:23 pm   INDICATION: Signs/Symptoms:tachy    COMPARISON: 03/03/2024   ACCESSION NUMBER(S): [id number]   ORDERING CLINICIAN: DOUGLAS WATSON   TECHNIQUE: Single AP view chest   FINDINGS: Cardiomediastinal silhouette is within normal limits. Aorta is tortuous. There is diffuse vascular calcification of the aortic knob. Lung fields are hyperinflated. No infiltrate or effusion is identified.   There is component of exaggerated thoracic kyphosis. Multilevel anterior and lateral osteophyte formation demonstrated with multilevel endplate sclerosis. Endplate compression along the inferior endplate of the T12 vertebral body is demonstrated.             1. Emphysematous change without acute process.   2. Endplate compression along the inferior endplate of the T12 vertebral body as seen on CT from 08/07/2023   Signed by: Keerthi Coates 3/3/2024 1:42 PM Dictation workstation:   [id number]   Results for orders placed or performed during the hospital encounter of 03/03/24 (from the past 24 hour(s))   Sars-CoV-2 and Influenza A/B PCR   Result Value Ref Range    Flu A Result Not Detected Not Detected    Flu B Result Not Detected Not Detected    Coronavirus 2019, PCR Detected (A) Not Detected   CBC and Auto Differential   Result Value Ref Range    WBC 8.7 4.4 - 11.3 x10*3/uL    nRBC 0.0 0.0 - 0.0 /100 WBCs    RBC 4.15 4.00 - 5.20 x10*6/uL    Hemoglobin 12.9 12.0 - 16.0 g/dL    Hematocrit 39.4 36.0 - 46.0 %    MCV 95 80 - 100 fL    MCH 31.1 26.0 - 34.0 pg    MCHC 32.7 32.0 - 36.0 g/dL    RDW 12.9 11.5 - 14.5 %    Platelets 360 150 - 450 x10*3/uL    Neutrophils % 75.5 40.0 - 80.0 %    Immature Granulocytes %, Automated 1.0 (H) 0.0 - 0.9 %    Lymphocytes % 16.9 13.0 - 44.0 %    Monocytes % 5.8 2.0 - 10.0 %    Eosinophils % 0.3 0.0 - 6.0 %    Basophils % 0.5 0.0 - 2.0 %    Neutrophils Absolute 6.54 (H) 1.60 - 5.50 x10*3/uL    Immature Granulocytes Absolute, Automated 0.09 0.00 - 0.50 x10*3/uL    Lymphocytes Absolute 1.46 0.80 - 3.00 x10*3/uL    Monocytes Absolute 0.50 0.05 -  0.80 x10*3/uL    Eosinophils Absolute 0.03 0.00 - 0.40 x10*3/uL    Basophils Absolute 0.04 0.00 - 0.10 x10*3/uL   Comprehensive metabolic panel   Result Value Ref Range    Glucose 335 (H) 65 - 99 mg/dL    Sodium 130 (L) 133 - 145 mmol/L    Potassium 4.1 3.4 - 5.1 mmol/L    Chloride 95 (L) 97 - 107 mmol/L    Bicarbonate 15 (L) 24 - 31 mmol/L    Urea Nitrogen 27 (H) 8 - 25 mg/dL    Creatinine 1.60 0.40 - 1.60 mg/dL    eGFR 30 (L) >60 mL/min/1.73m*2    Calcium 9.4 8.5 - 10.4 mg/dL    Albumin 3.7 3.5 - 5.0 g/dL    Alkaline Phosphatase 151 (H) 35 - 125 U/L    Total Protein 7.4 5.9 - 7.9 g/dL    AST 22 5 - 40 U/L    Bilirubin, Total <0.2 0.1 - 1.2 mg/dL    ALT 14 5 - 40 U/L    Anion Gap >19 (H) <=19 mmol/L   NT Pro-BNP   Result Value Ref Range    PROBNP 939 (H) 0 - 624 pg/mL   D-dimer, quantitative   Result Value Ref Range    D-Dimer Non VTE, Quant (mg/L FEU) 2.05 (H) 0.19 - 0.50 mg/L FEU   Serial Troponin, Initial ([facility])   Result Value Ref Range    Troponin T, High Sensitivity 28 (HH) <=14 ng/L   Magnesium   Result Value Ref Range    Magnesium 1.60 1.60 - 3.10 mg/dL   Phosphorus   Result Value Ref Range    Phosphorus 3.5 2.5 - 4.5 mg/dL   Serial Troponin, 2 Hour ([facility])   Result Value Ref Range    Troponin T, High Sensitivity 27 (HH) <=14 ng/L   BLOOD GAS VENOUS FULL PANEL   Result Value Ref Range    POCT pH, Venous 7.35 7.33 - 7.43 pH    POCT pCO2, Venous 34 (L) 41 - 51 mm Hg    POCT pO2, Venous 44 35 - 45 mm Hg    POCT SO2, Venous 74 45 - 75 %    POCT Oxy Hemoglobin, Venous 71.6 45.0 - 75.0 %    POCT Hematocrit Calculated, Venous 35.0 (L) 36.0 - 46.0 %    POCT Sodium, Venous 133 (L) 136 - 145 mmol/L    POCT Potassium, Venous 4.1 3.5 - 5.3 mmol/L    POCT Chloride, Venous 103 98 - 107 mmol/L    POCT Ionized Calicum, Venous 1.31 1.10 - 1.33 mmol/L    POCT Glucose, Venous 262 (H) 74 - 99 mg/dL    POCT Lactate, Venous 2.1 (H) 0.4 - 2.0 mmol/L    POCT Base Excess, Venous -6.0 (L) -2.0 - 3.0 mmol/L    POCT HCO3 Calculated,  Venous 18.8 (L) 22.0 - 26.0 mmol/L    POCT Hemoglobin, Venous 11.8 (L) 12.0 - 16.0 g/dL    POCT Anion Gap, Venous 15.0 10.0 - 25.0 mmol/L    Patient Temperature 37.0 degrees Celsius    FiO2 100 %   BLOOD GAS VENOUS   Result Value Ref Range    POCT pH, Venous 7.33 7.33 - 7.43 pH    POCT pCO2, Venous 35 (L) 41 - 51 mm Hg    POCT pO2, Venous 38 35 - 45 mm Hg    POCT SO2, Venous 71 45 - 75 %    POCT Oxy Hemoglobin, Venous 69.4 45.0 - 75.0 %    POCT Base Excess, Venous -6.6 (L) -2.0 - 3.0 mmol/L    POCT HCO3 Calculated, Venous 18.5 (L) 22.0 - 26.0 mmol/L    Patient Temperature 37.0 degrees Celsius    FiO2 100 %   Basic metabolic panel   Result Value Ref Range    Glucose 244 (H) 65 - 99 mg/dL    Sodium 131 (L) 133 - 145 mmol/L    Potassium 3.9 3.4 - 5.1 mmol/L    Chloride 100 97 - 107 mmol/L    Bicarbonate 17 (L) 24 - 31 mmol/L    Urea Nitrogen 27 (H) 8 - 25 mg/dL    Creatinine 1.50 0.40 - 1.60 mg/dL    eGFR 32 (L) >60 mL/min/1.73m*2    Calcium 8.4 (L) 8.5 - 10.4 mg/dL    Anion Gap 14 <=19 mmol/L   Blood Gas Lactic Acid, Venous   Result Value Ref Range    POCT Lactate, Venous 2.5 (H) 0.4 - 2.0 mmol/L   Urinalysis with Reflex Culture and Microscopic   Result Value Ref Range    Color, Urine Colorless (N) Light-Yellow, Yellow, Dark-Yellow    Appearance, Urine Clear Clear    Specific Gravity, Urine 1.014 1.005 - 1.035    pH, Urine 5.0 5.0, 5.5, 6.0, 6.5, 7.0, 7.5, 8.0    Protein, Urine NEGATIVE NEGATIVE, 10 (TRACE), 20 (TRACE) mg/dL    Glucose, Urine Normal Normal mg/dL    Blood, Urine NEGATIVE NEGATIVE    Ketones, Urine NEGATIVE NEGATIVE mg/dL    Bilirubin, Urine NEGATIVE NEGATIVE    Urobilinogen, Urine Normal Normal mg/dL    Nitrite, Urine NEGATIVE NEGATIVE    Leukocyte Esterase, Urine NEGATIVE NEGATIVE     CT angio chest for pulmonary embolism    Result Date: 3/3/2024  Interpreted By:  Keerthi Coates, STUDY: CT ANGIO CHEST FOR PULMONARY EMBOLISM; ;  3/3/2024 3:05 pm   INDICATION: Signs/Symptoms:Tachycardia, positive  D-dimer, concern for pulmonary embolism. Shortness of breath   COMPARISON: 10/25/2010   ACCESSION NUMBER(S): [id number]   ORDERING CLINICIAN: DOUGLAS WARNER   TECHNIQUE: CT angiogram performed through the chest following intravenous administration of 75 mL of Omnipaque 350. MIP reconstruction images reformatted in the coronal and sagittal projection   All CT examinations are performed with 1 or more of the following dose reduction techniques: Automated exposure control, adjustment of mA and/or kv according to patient's size, or use of iterative reconstruction techniques.   FINDINGS: Mediastinum demonstrates no lymphadenopathy. Hilar lymphadenopathy demonstrated. Esophagus is unremarkable   Heart and great vessels demonstrate ascending thoracic aorta to measure 2.8 cm. There is moderate vascular calcification of the thoracic aorta. Main pulmonary artery is unremarkable. Central and segmental pulmonary arteries demonstrate no filling defects to suggest the possibility of pulmonary embolus. Characterization peripherally limited secondary to timing of contrast bolus.   Lung parenchyma demonstrates right and left lower lobe linear platelike atelectasis/scar. There is basilar dependent atelectasis. No airspace consolidation. No pleural effusions demonstrated.   Included portions visualized abdomen demonstrate a hypodensity with cyst in the midpole of the right kidney laterally measuring 8 mm. Left kidney visualized portions demonstrates incompletely visualized hypodensity within the midpole laterally measuring 9 mm suggesting renal cysts. Scattered portacaval and portal lymph nodes are demonstrated as seen on the remote CT with portal lymph node identified measuring 10 mm in short axis stable from the remote examination.   Visualized osseous structures demonstrate multilevel degenerative discogenic changes and exaggerated thoracic kyphosis within the spine. Multilevel endplate sclerosis demonstrated. There is a moderate  compression deformity of the T12 vertebral body in particular along the inferior endplate with component of findings as seen on exam from 08/07/2023. However, the compression fracture appears more conspicuous on the current examination           1. No evidence for pulmonary embolus with characterization peripherally limited secondary to timing of the contrast bolus   2. Bilateral basilar linear scar and chronic appearing basilar interstitial change   3. Moderate compression fracture along the T12 vertebral body in particular inferior endplate appearing more conspicuous from CT dated 08/07/2023.     MACRO: None   Signed by: Keerthi Coates 3/3/2024 3:38 PM Dictation workstation:   [id number]    XR chest 2 views    Result Date: 3/3/2024  Interpreted By:  Keerthi Coates, STUDY: XR CHEST 2 VIEWS 3/3/2024 1:23 pm   INDICATION: Signs/Symptoms:tachy   COMPARISON: 03/03/2024   ACCESSION NUMBER(S): [id number]   ORDERING CLINICIAN: DOUGLAS WATSON   TECHNIQUE: Single AP view chest   FINDINGS: Cardiomediastinal silhouette is within normal limits. Aorta is tortuous. There is diffuse vascular calcification of the aortic knob. Lung fields are hyperinflated. No infiltrate or effusion is identified.   There is component of exaggerated thoracic kyphosis. Multilevel anterior and lateral osteophyte formation demonstrated with multilevel endplate sclerosis. Endplate compression along the inferior endplate of the T12 vertebral body is demonstrated.             1. Emphysematous change without acute process.   2. Endplate compression along the inferior endplate of the T12 vertebral body as seen on CT from 08/07/2023   Signed by: Keerthi Coates 3/3/2024 1:42 PM Dictation workstation:   [id number]        Assessment/Plan   Principal Problem:    COVID-19  Active Problems:    Anxiety    Arteriosclerotic cardiovascular disease    Diabetes mellitus (CMS/HCC)    Benign essential HTN    Spinal stenosis, lumbar region, with neurogenic  claudication    Tachycardia      Will consult ID  COVID isolation  Insulin to cover for elevated blood sugar  IV fluids gently to help with the heart rate and blood pressure  Get metoprolol  Consult cardiology for elevated heart rate  Continue home medications  PT OT and social service consult       I spent  minutes in the professional and overall care of this patient.      Darya Peguero MD

## 2024-03-04 NOTE — PROGRESS NOTES
[facility] spoke to pt regarding therapy recommendations for SNF. Patient refusing at this time would like to discharge home with [facility] provided patient would like  HC   Will need internal referral for home health upon discharge    ** do not discharge without speaking to care coordination**      Sydni Salgado RN

## 2024-03-04 NOTE — CONSULTS
Consults  History Of Present Illness:    Catie Groves is a 89 y.o. female presenting with palpitations.     The patient's past medical history includes the followin CAD with anteroseptal MI 1995.     2. Status post PCI to high-grade mid LAD stenosis 1995. Patient is without any anginal symptoms. She did have an IV pharmacological nuclear stress test on 2013. That was negative for evidence of ischemia. The patient did have a repeat cardiac catheter performed parenthetically in 2008, with results as noted. More recently the patient was admitted to [facility] [place] [facility] on 2014 with recurrent diverticulitis along with some intermittent chest tightness/discomfort. As such she did have a pharmacological nuclear stress test performed on 2014 which was negative for evidence of ischemia. Patient was admitted to [facility] on 11/3/2014 for chest pain and hypertension. She has not had any recurrent chest pain. ECG done today shows NSR.     3. Status post a right groin exploration and repair of right femoral artery occlusion following cardiac cath 2008.     4. Hyperlipidemia. The patient will continue atorvastatin 40 mg daily. Recent lipid panel from 2020 includes cholesterol 158 LDL 77 HDL 52 triglyceride 143. The SMA panel was normal except creatinine 1.37. Glycohemoglobin was 6.1%.     5. Hypertension. The patient's blood pressure is slightly elevated today. The patient has a considerable degree of anxiety with respect to her blood pressure. Will continue amlodipine 10 mg daily, losartan 100 mg daily and metoprolol  mg daily unchanged for now.     6. Type 2 diabetes. Diabetic control has been satisfactory. Her recent glycohemoglobin was 6.1% on 2020.     7. Laparoscopic cholecystectomy with multiple postoperative ERCPs 2006.     8. History of pancreas divisum.     9. Hyperplastic colonic polyps.     10. Lumbosacral spinal DJD with lumbar radiculopathy.     11.  History of DJD.     12. History of Gamble's neuroma, left foot.     13. History of pulmonary embolism, left lower lobe.     14, former Coumadin anticoagulation.     15. Gastroparesis.     16. GERD.     17. History of splenic artery aneurysm. The patient had a repeat CT angiogram on 10/29/2013 which demonstrated that the splenic artery aneurysm was unchanged in size.     18. History of reactive depression.     19. History of syncope with head trauma and tiny intracranial subarachnoid hemorrhage. Please see previous office notes for review of the day of 5/31. The patient had gotten up from the couch to use the bathroom when she became woozy lightheaded and fell, striking her head. She was taken [facility]. Head CT showed a left parietal scalp hematoma, along with a small 2 x 7 mm focus of increased attenuation within the left frontal sulcus consistent with a small amount of subarachnoid hemorrhage or petechial hemorrhage of adjacent cortex with no mass effect. The patient was transferred to [facility] observed for 2 days. Repeat CT scanning was done during that admission. It was thought that she might have had orthostatic hypotension as the cause of her fall and as noted. Her previously prescribed imdur was stopped. Patient has not had any recurrent syncopal episodes.      20. Status post partial colectomy for diverticulitis 6/16/2014 with postoperative C. difficile colitis. This patient was admitted to [facility] on 6/6/2014 with acute diverticulitis. She ultimately underwent a partial colectomy on 6/16. She was hospitalized for a period of one week afterwards and then sent to [facility] for 2 weeks rehabilitation. She was then readmitted to [facility] because of C. difficile colitis and was hospitalized for another 2 weeks. She was sent to [place] for rehabilitation for one week.      21. Hernia repair 2/2015 with Dr. Rodriguez at [facility].      22. Carotid  vascular disease.     23. Palpitations. Hospital stay 09/2018 at [facility]. Was on metoprolol succinate 200 mg, but has decreased it to 100 mg without return of symptoms.      24. TIA. Had hospital stay at [facility] 01/17/2020 for suspected TIA. Had vision changes and buzzing in the head which have almost completely resolved. CT of brain was negative. MRI of brain was negative, but showed mastoiditis. She will schedule to see ENT. Will continue plavix. Is off asa.      25. Hx of covid-19 vaccine #1 and #2.    The patient recently had been diagnosed with COVID after experiencing some upper respiratory symptoms including nasal and sinus congestion.  She was treated with Paxlovid along with oral antibiotics.  She had some improvement in her generalized congestion fatigue and cough but 2 days ago on 3/2/2024 she felt somewhat nauseated and detected a low blood pressure value.  As such she did not take her blood pressure medications.  Later in the day she began to experience palpitations which persisted on into the following morning with a rapid heart rate..  She presented to the [facility] emergency room to be further evaluated.  Her initial vital signs in the emergency room included a heart rate of 148 with a blood pressure of 133/87.  The patient's EKG demonstrated sinus tachycardia at 137/min left axis deviation and a diffuse nonspecific ST abnormality.  CT angiogram of the chest was negative for pulmonary embolization with bilateral basilar linear scars and chronic appearing basilar interstitial change.  There was a moderate compression fracture along the T12 vertebral body particularly the inferior endplate.  The standard portable chest x-ray showed emphysematous change without acute process again with an endplate compression fracture of the inferior endplate of the T12 vertebral body.  The patient's nasal swab was negative for influenza AB and coronavirus.  CBC was in normal range WBC 8700 hematocrit 39.4.  The  comprehensive metabolic panel included BUN 27 creatinine 1.6 glucose 335.  The proBNP was 939.  D-dimer 2.05.  High-sensitivity troponin was 28 repeated at 27.  Arterial blood gas included pH 7.33 pCO2 35 pO2 71.  The patient has been started on IV fluids.       Last Recorded Vitals:  Vitals:    03/03/24 2330 03/04/24 0000 03/04/24 0130 03/04/24 0700   BP: 126/62 136/65 147/71 144/70   BP Location:    Left leg   Patient Position:    Lying   Pulse: 86 91 97 84   Resp: 15 15 17 17   Temp:    36 °C (96.8 °F)   TempSrc:    Temporal   SpO2: 95%   96%   Weight:       Height:           Last Labs:  CBC - 3/4/2024:  4:50 AM  7.5 12.2 310    38.2      CMP - 3/4/2024:  4:50 AM  9.0 6.8 26 --- <0.2   3.5 3.3 15 134      PTT - No results in last year.  _   _ _     Hemoglobin A1C   Date/Time Value Ref Range Status   10/14/2023 08:30 AM 6.4 (H) see below % Final   06/28/2023 03:40 PM 6.4 (A) % Final     Comment:          Diagnosis of Diabetes-Adults   Non-Diabetic: < or = 5.6%   Increased risk for developing diabetes: 5.7-6.4%   Diagnostic of diabetes: > or = 6.5%  .       Monitoring of Diabetes                Age (y)     Therapeutic Goal (%)   Adults:          >18           <7.0   Pediatrics:    13-18           <7.5                   7-12           <8.0                   0- 6            7.5-8.5   American Diabetes Association. Diabetes Care 33(S1), Jan 2010.   04/15/2023 09:07 AM 7.6 (A) % Final     Comment:          Diagnosis of Diabetes-Adults   Non-Diabetic: < or = 5.6%   Increased risk for developing diabetes: 5.7-6.4%   Diagnostic of diabetes: > or = 6.5%  .       Monitoring of Diabetes                Age (y)     Therapeutic Goal (%)   Adults:          >18           <7.0   Pediatrics:    13-18           <7.5                   7-12           <8.0                   0- 6            7.5-8.5   American Diabetes Association. Diabetes Care 33(S1), Jan 2010.       LDL Calculated   Date/Time Value Ref Range Status   10/14/2023 08:30  "AM 74 <=99 mg/dL Final     Comment:                                 Near   Borderline      AGE      Desirable  Optimal    High     High     Very High     0-19 Y     0 - 109     ---    110-129   >/= 130     ----    20-24 Y     0 - 119     ---    120-159   >/= 160     ----      >24 Y     0 -  99   100-129  130-159   160-189     >/=190     09/27/2018 03:10 AM 55 (L) 65 - 130 MG/DL Final     VLDL   Date/Time Value Ref Range Status   10/14/2023 08:30 AM 34 0 - 40 mg/dL Final   06/28/2023 03:40 PM 30 0 - 40 mg/dL Final   10/19/2022 08:40 AM 29 0 - 40 mg/dL Final   05/11/2022 08:32 AM 31 0 - 40 mg/dL Final      Last I/O:  No intake/output data recorded.    Past Cardiology Tests (Last 3 Years):  EKG:  ECG 12 lead 03/03/2024 (Preliminary)    Echo:  No results found for this or any previous visit from the past 1095 days.    Ejection Fractions:  No results found for: \"EF\"  Cath:  No results found for this or any previous visit from the past 1095 days.    Stress Test:  No results found for this or any previous visit from the past 1095 days.    Cardiac Imaging:  No results found for this or any previous visit from the past 1095 days.      Past Medical History:  She has a past medical history of Gastroparesis, Other conditions influencing health status, Other conditions influencing health status, Other conditions influencing health status, Other conditions influencing health status, Peripheral vascular angioplasty status, Personal history of other diseases of the circulatory system, Personal history of other diseases of the circulatory system, Personal history of other diseases of the digestive system, Personal history of other diseases of the nervous system and sense organs, Personal history of other endocrine, nutritional and metabolic disease (01/27/2017), Personal history of other endocrine, nutritional and metabolic disease, Personal history of other specified conditions (06/17/2019), and Unspecified secondary " cataract.    Past Surgical History:  She has a past surgical history that includes Cholecystectomy (04/01/2013); Other surgical history (04/01/2013); Other surgical history (04/01/2013); Coronary angioplasty (04/01/2013); Other surgical history (10/30/2018); Other surgical history (10/30/2018); Other surgical history (06/24/2020); MR angio head wo IV contrast (10/15/2014); MR angio neck wo IV contrast (1/18/2020); and MR angio head wo IV contrast (1/18/2020).      Social History:  She reports that she has never smoked. She has never been exposed to tobacco smoke. She has never used smokeless tobacco. She reports that she does not drink alcohol and does not use drugs.    Family History:  Family History   Problem Relation Name Age of Onset    Anxiety disorder Mother      Heart disease Father          Allergies:  Amoxicillin-pot clavulanate and Levofloxacin    Inpatient Medications:  Scheduled medications   Medication Dose Route Frequency    acetaminophen  975 mg oral Once    ALPRAZolam  0.25 mg oral TID    amLODIPine  10 mg oral Daily    aspirin  81 mg oral Daily    atenolol  100 mg oral Daily    atorvastatin  40 mg oral Nightly    azelastine  2 spray Each Nostril BID    clopidogrel  75 mg oral Daily    docusate sodium  100 mg oral BID    fluticasone  2 spray Each Nostril Daily    gabapentin  300 mg oral BID    heparin (porcine)  5,000 Units subcutaneous q8h KAMILAH    losartan  100 mg oral Daily    melatonin  3 mg oral Nightly    metoprolol succinate XL  100 mg oral BID    mirtazapine  15 mg oral Nightly    pantoprazole  40 mg oral Daily before breakfast    Or    pantoprazole  40 mg intravenous Daily before breakfast    polyethylene glycol  17 g oral Daily    ursodiol  300 mg oral BID     PRN medications   Medication    acetaminophen    Or    acetaminophen    Or    acetaminophen    benzocaine-menthol    dextromethorphan-guaifenesin    guaiFENesin    ondansetron ODT    Or    ondansetron     Continuous Medications    Medication Dose Last Rate    sodium chloride 0.9%  75 mL/hr 75 mL/hr (03/03/24 2017)     Outpatient Medications:  Current Outpatient Medications   Medication Instructions    ALPRAZolam (XANAX) 0.25 mg, oral, 3 times daily    amLODIPine (NORVASC) 10 mg, oral, Daily    aspirin 81 mg, oral, Daily    atenolol (TENORMIN) 100 mg, oral, Daily    atorvastatin (LIPITOR) 40 mg, oral, Daily    azelastine (Astelin) 137 mcg (0.1 %) nasal spray 2 sprays, Each Nostril, 2 times daily    cholecalciferol (Vitamin D3) 50 mcg (2,000 unit) capsule 1 capsule, oral, Daily    clopidogrel (PLAVIX) 75 mg, oral, Daily    Contour Test Strips strip USE TO TEST BLOOD SUGAR TWICE DAILY    cyclobenzaprine (FLEXERIL) 10 mg, oral, 2 times daily PRN    docusate sodium (COLACE) 100 mg, oral, 2 times daily    fluticasone (Flonase) 50 mcg/actuation nasal spray 2 sprays, Each Nostril, Daily    gabapentin (NEURONTIN) 300 mg, oral, 2 times daily    glipiZIDE (GLUCOTROL) 5 mg, oral, Daily    Lactobacillus acidophilus (PROBIOTIC ORAL) oral    lactulose 30 g, oral, Daily    lancets (Microlet Lancet) misc Use to test 2-3 daily.    loratadine (CLARITIN) 10 mg, oral, Daily    losartan (COZAAR) 100 mg, oral, Daily    losartan (COZAAR) 25 mg, oral, Daily    methylPREDNISolone (Medrol Dospak) 4 mg tablets Take as directed on package.    metoclopramide (REGLAN) 5 mg, oral, 4 times daily    metoprolol succinate XL (TOPROL-XL) 100 mg, oral, 2 times daily    mirtazapine (REMERON) 7.5 mg, oral, Every evening    mirtazapine (REMERON) 15 mg, oral, Nightly    multivit with minerals/lutein (VISION PLUS LUTEIN ORAL) Take as directed     nitroglycerin (NITROSTAT) 0.4 mg, sublingual, Every 5 min PRN    pantoprazole (PROTONIX) 40 mg, oral, Daily before breakfast    polyethylene glycol (GLYCOLAX, MIRALAX) 17 g, oral, Daily    simvastatin (ZOCOR) 40 mg, oral, Daily    ursodiol (ACTIGALL) 300 mg, oral, 2 times daily    vancomycin HCl (VANCOMYCIN ORAL) 1 tablet, oral        Physical Exam:  The patient is a somewhat fatigued appearing upper elderly white female lying in bed no respiratory distress nasal oxygen  JVP not elevated carotid impulses 2+ no bruit no palpable thyroid enlargement  Chest mild thoracic kyphosis fair movement breath sounds relatively clear  Cardiac rhythm regular no premature beat S1-S2 normal minimal systolic murmur  Abdomen is soft without focal tenderness no paraspinal megaly  No peripheral edema pedal pulses present     Assessment/Plan     3/4:The patient is a 89-year-old white female with a past medical history including CAD with remote anteroseptal MI with PCI to high-grade mid LAD stenosis 4/19/1995 with repeat cardiac catheterization having been performed 10/12/2008.  She does have a history of hypertension hyperlipidemia type 2 diabetes cholecystectomy remote pulmonary embolism former Coumadin anticoagulation splenic artery aneurysm syncopal episode with head trauma and tiny intracranial subdural hemorrhage, partial colectomy for diverticulitis.  The patient had a recent episode of COVID-19 and possible sinusitis treated with Paxil bed and antibiotics.  She had some improvement but then developed increased weakness recorded low blood pressure and palpitations with rapid heart rate.  She presented to the [facility] emergency room where she was confirmed to have a very prominent sinus tachycardia at 137/min.  Patient had held her usual blood pressure medicines for 1 day including her beta-blocker.  Her tachycardia has resolved with heart rates now in the 80-90/min range.  She will be continued on metoprolol succinate 100 mg twice daily with discontinuance of atenolol.  Would advise continued IV fluid rehydration for another 24 hours.  Blood pressure readings are acceptable with resumption of her usual amlodipine 10 mg daily losartan 100 mg daily and the beta-blockade.  Will check echocardiogram.    Peripheral IV 03/03/24 20 G Left Antecubital  (Active)   Site Assessment Clean;Dry;Intact 03/03/24 1307   Dressing Type Transparent;Securing device 03/03/24 1307   Line Status Blood return noted;Occluded;Lab draw;Saline locked 03/03/24 1307   Dressing Status Dry;Clean 03/03/24 1307   Number of days: 1       Code Status:  Full Code    I spent 30 minutes in the professional and overall care of this patient.        David Hernandez MD

## 2024-03-05 LAB
ANION GAP SERPL CALC-SCNC: 12 MMOL/L
BUN SERPL-MCNC: 15 MG/DL (ref 8–25)
CALCIUM SERPL-MCNC: 9 MG/DL (ref 8.5–10.4)
CHLORIDE SERPL-SCNC: 106 MMOL/L (ref 97–107)
CO2 SERPL-SCNC: 19 MMOL/L (ref 24–31)
CREAT SERPL-MCNC: 1 MG/DL (ref 0.4–1.6)
EGFRCR SERPLBLD CKD-EPI 2021: 52 ML/MIN/1.73M*2
ERYTHROCYTE [DISTWIDTH] IN BLOOD BY AUTOMATED COUNT: 13 % (ref 11.5–14.5)
GLUCOSE SERPL-MCNC: 152 MG/DL (ref 65–99)
HCT VFR BLD AUTO: 36.8 % (ref 36–46)
HGB BLD-MCNC: 11.9 G/DL (ref 12–16)
MCH RBC QN AUTO: 30.7 PG (ref 26–34)
MCHC RBC AUTO-ENTMCNC: 32.3 G/DL (ref 32–36)
MCV RBC AUTO: 95 FL (ref 80–100)
NRBC BLD-RTO: 0 /100 WBCS (ref 0–0)
PLATELET # BLD AUTO: 283 X10*3/UL (ref 150–450)
POTASSIUM SERPL-SCNC: 4.2 MMOL/L (ref 3.4–5.1)
RBC # BLD AUTO: 3.87 X10*6/UL (ref 4–5.2)
SODIUM SERPL-SCNC: 137 MMOL/L (ref 133–145)
WBC # BLD AUTO: 10.1 X10*3/UL (ref 4.4–11.3)

## 2024-03-05 PROCEDURE — 2500000001 HC RX 250 WO HCPCS SELF ADMINISTERED DRUGS (ALT 637 FOR MEDICARE OP): Performed by: INTERNAL MEDICINE

## 2024-03-05 PROCEDURE — 99232 SBSQ HOSP IP/OBS MODERATE 35: CPT | Performed by: INTERNAL MEDICINE

## 2024-03-05 PROCEDURE — 80048 BASIC METABOLIC PNL TOTAL CA: CPT | Performed by: INTERNAL MEDICINE

## 2024-03-05 PROCEDURE — 97110 THERAPEUTIC EXERCISES: CPT | Mod: GP,CQ

## 2024-03-05 PROCEDURE — 2500000004 HC RX 250 GENERAL PHARMACY W/ HCPCS (ALT 636 FOR OP/ED): Performed by: INTERNAL MEDICINE

## 2024-03-05 PROCEDURE — 97116 GAIT TRAINING THERAPY: CPT | Mod: GP,CQ

## 2024-03-05 PROCEDURE — 36415 COLL VENOUS BLD VENIPUNCTURE: CPT | Performed by: INTERNAL MEDICINE

## 2024-03-05 PROCEDURE — 85027 COMPLETE CBC AUTOMATED: CPT | Performed by: INTERNAL MEDICINE

## 2024-03-05 PROCEDURE — 2060000001 HC INTERMEDIATE ICU ROOM DAILY

## 2024-03-05 PROCEDURE — 2500000002 HC RX 250 W HCPCS SELF ADMINISTERED DRUGS (ALT 637 FOR MEDICARE OP, ALT 636 FOR OP/ED): Performed by: INTERNAL MEDICINE

## 2024-03-05 RX ORDER — TRAMADOL HYDROCHLORIDE 50 MG/1
50 TABLET ORAL EVERY 6 HOURS PRN
Status: DISCONTINUED | OUTPATIENT
Start: 2024-03-05 | End: 2024-03-06

## 2024-03-05 RX ADMIN — GABAPENTIN 300 MG: 300 CAPSULE ORAL at 08:46

## 2024-03-05 RX ADMIN — METOPROLOL SUCCINATE 100 MG: 100 TABLET, EXTENDED RELEASE ORAL at 08:40

## 2024-03-05 RX ADMIN — HEPARIN SODIUM 5000 UNITS: 5000 INJECTION, SOLUTION INTRAVENOUS; SUBCUTANEOUS at 21:55

## 2024-03-05 RX ADMIN — ALPRAZOLAM 0.25 MG: 0.25 TABLET ORAL at 08:39

## 2024-03-05 RX ADMIN — ALPRAZOLAM 0.25 MG: 0.25 TABLET ORAL at 15:10

## 2024-03-05 RX ADMIN — ATORVASTATIN CALCIUM 40 MG: 40 TABLET, FILM COATED ORAL at 20:19

## 2024-03-05 RX ADMIN — CLOPIDOGREL BISULFATE 75 MG: 75 TABLET ORAL at 08:39

## 2024-03-05 RX ADMIN — TRAMADOL HYDROCHLORIDE 50 MG: 50 TABLET ORAL at 15:10

## 2024-03-05 RX ADMIN — URSODIOL 300 MG: 300 CAPSULE ORAL at 08:46

## 2024-03-05 RX ADMIN — ACETAMINOPHEN 650 MG: 325 TABLET ORAL at 23:30

## 2024-03-05 RX ADMIN — MIRTAZAPINE 15 MG: 15 TABLET, FILM COATED ORAL at 20:18

## 2024-03-05 RX ADMIN — Medication 3 MG: at 20:19

## 2024-03-05 RX ADMIN — HEPARIN SODIUM 5000 UNITS: 5000 INJECTION, SOLUTION INTRAVENOUS; SUBCUTANEOUS at 15:10

## 2024-03-05 RX ADMIN — AZELASTINE HYDROCHLORIDE 2 SPRAY: 137 SPRAY, METERED NASAL at 08:47

## 2024-03-05 RX ADMIN — LOSARTAN POTASSIUM 100 MG: 100 TABLET, FILM COATED ORAL at 08:39

## 2024-03-05 RX ADMIN — ACETAMINOPHEN 650 MG: 325 TABLET ORAL at 08:41

## 2024-03-05 RX ADMIN — HEPARIN SODIUM 5000 UNITS: 5000 INJECTION, SOLUTION INTRAVENOUS; SUBCUTANEOUS at 05:42

## 2024-03-05 RX ADMIN — ALPRAZOLAM 0.25 MG: 0.25 TABLET ORAL at 21:55

## 2024-03-05 RX ADMIN — AZELASTINE HYDROCHLORIDE 2 SPRAY: 137 SPRAY, METERED NASAL at 20:21

## 2024-03-05 RX ADMIN — DOCUSATE SODIUM 100 MG: 100 CAPSULE, LIQUID FILLED ORAL at 20:18

## 2024-03-05 RX ADMIN — GABAPENTIN 300 MG: 300 CAPSULE ORAL at 20:18

## 2024-03-05 RX ADMIN — AMLODIPINE BESYLATE 10 MG: 10 TABLET ORAL at 08:46

## 2024-03-05 RX ADMIN — PANTOPRAZOLE SODIUM 40 MG: 40 TABLET, DELAYED RELEASE ORAL at 06:04

## 2024-03-05 RX ADMIN — FLUTICASONE PROPIONATE 2 SPRAY: 50 SPRAY, METERED NASAL at 08:47

## 2024-03-05 RX ADMIN — ASPIRIN 81 MG CHEWABLE TABLET 81 MG: 81 TABLET CHEWABLE at 08:46

## 2024-03-05 RX ADMIN — URSODIOL 300 MG: 300 CAPSULE ORAL at 20:19

## 2024-03-05 RX ADMIN — METOPROLOL SUCCINATE 100 MG: 100 TABLET, EXTENDED RELEASE ORAL at 20:18

## 2024-03-05 ASSESSMENT — COGNITIVE AND FUNCTIONAL STATUS - GENERAL
STANDING UP FROM CHAIR USING ARMS: A LITTLE
TURNING FROM BACK TO SIDE WHILE IN FLAT BAD: A LITTLE
TOILETING: A LITTLE
MOVING FROM LYING ON BACK TO SITTING ON SIDE OF FLAT BED WITH BEDRAILS: A LITTLE
MOBILITY SCORE: 15
MOVING FROM LYING ON BACK TO SITTING ON SIDE OF FLAT BED WITH BEDRAILS: A LITTLE
MOBILITY SCORE: 17
STANDING UP FROM CHAIR USING ARMS: A LITTLE
PERSONAL GROOMING: A LITTLE
STANDING UP FROM CHAIR USING ARMS: A LITTLE
MOVING TO AND FROM BED TO CHAIR: A LITTLE
CLIMB 3 TO 5 STEPS WITH RAILING: TOTAL
TOILETING: A LITTLE
WALKING IN HOSPITAL ROOM: A LOT
CLIMB 3 TO 5 STEPS WITH RAILING: TOTAL
HELP NEEDED FOR BATHING: A LOT
MOVING TO AND FROM BED TO CHAIR: A LITTLE
DRESSING REGULAR UPPER BODY CLOTHING: A LITTLE
WALKING IN HOSPITAL ROOM: A LITTLE
MOVING FROM LYING ON BACK TO SITTING ON SIDE OF FLAT BED WITH BEDRAILS: A LITTLE
DRESSING REGULAR UPPER BODY CLOTHING: A LITTLE
DAILY ACTIVITIY SCORE: 17
TURNING FROM BACK TO SIDE WHILE IN FLAT BAD: A LITTLE
DAILY ACTIVITIY SCORE: 17
PERSONAL GROOMING: A LITTLE
CLIMB 3 TO 5 STEPS WITH RAILING: A LOT
WALKING IN HOSPITAL ROOM: A LOT
HELP NEEDED FOR BATHING: A LOT
MOBILITY SCORE: 15
TURNING FROM BACK TO SIDE WHILE IN FLAT BAD: A LITTLE
DRESSING REGULAR LOWER BODY CLOTHING: A LOT
MOVING TO AND FROM BED TO CHAIR: A LITTLE
DRESSING REGULAR LOWER BODY CLOTHING: A LOT

## 2024-03-05 ASSESSMENT — PAIN - FUNCTIONAL ASSESSMENT
PAIN_FUNCTIONAL_ASSESSMENT: 0-10
PAIN_FUNCTIONAL_ASSESSMENT: 0-10

## 2024-03-05 ASSESSMENT — PAIN DESCRIPTION - LOCATION: LOCATION: LEG

## 2024-03-05 ASSESSMENT — PAIN DESCRIPTION - ORIENTATION: ORIENTATION: RIGHT;LEFT

## 2024-03-05 ASSESSMENT — PAIN SCALES - GENERAL
PAINLEVEL_OUTOF10: 4
PAINLEVEL_OUTOF10: 5 - MODERATE PAIN
PAINLEVEL_OUTOF10: 6
PAINLEVEL_OUTOF10: 0 - NO PAIN
PAINLEVEL_OUTOF10: 0 - NO PAIN

## 2024-03-05 NOTE — PROGRESS NOTES
Subjective Data:  Patient sitting in bedside chair still feeling somewhat weak not short of breath.    Overnight Events:    No significant overnight events.     Objective Data:  Last Recorded Vitals:  Vitals:    03/04/24 1948 03/05/24 0044 03/05/24 0557 03/05/24 0725   BP: 126/56 121/55  146/85   BP Location: Left arm Left arm  Left arm   Patient Position: Lying Lying  Lying   Pulse: 86   107   Resp: 20 16 15 18   Temp: 36 °C (96.8 °F) 36.3 °C (97.3 °F) 36.5 °C (97.7 °F) 38.3 °C (100.9 °F)   TempSrc: Temporal Temporal Temporal Temporal   SpO2: 96% 94%  95%   Weight:       Height:           Last Labs:  CBC - 3/5/2024:  5:32 AM  10.1 11.9 283    36.8      CMP - 3/5/2024:  5:32 AM  9.0 6.8 26 --- <0.2   3.5 3.3 15 134      PTT - No results in last year.  _   _ _     HGBA1C   Date/Time Value Ref Range Status   10/14/2023 08:30 AM 6.4 see below % Final   06/28/2023 03:40 PM 6.4 % Final     Comment:          Diagnosis of Diabetes-Adults   Non-Diabetic: < or = 5.6%   Increased risk for developing diabetes: 5.7-6.4%   Diagnostic of diabetes: > or = 6.5%  .       Monitoring of Diabetes                Age (y)     Therapeutic Goal (%)   Adults:          >18           <7.0   Pediatrics:    13-18           <7.5                   7-12           <8.0                   0- 6            7.5-8.5   American Diabetes Association. Diabetes Care 33(S1), Jan 2010.   04/15/2023 09:07 AM 7.6 % Final     Comment:          Diagnosis of Diabetes-Adults   Non-Diabetic: < or = 5.6%   Increased risk for developing diabetes: 5.7-6.4%   Diagnostic of diabetes: > or = 6.5%  .       Monitoring of Diabetes                Age (y)     Therapeutic Goal (%)   Adults:          >18           <7.0   Pediatrics:    13-18           <7.5                   7-12           <8.0                   0- 6            7.5-8.5   American Diabetes Association. Diabetes Care 33(S1), Jan 2010.       LDLCALC   Date/Time Value Ref Range Status   10/14/2023 08:30 AM 74 <=99  mg/dL Final     Comment:                                 Near   Borderline      AGE      Desirable  Optimal    High     High     Very High     0-19 Y     0 - 109     ---    110-129   >/= 130     ----    20-24 Y     0 - 119     ---    120-159   >/= 160     ----      >24 Y     0 -  99   100-129  130-159   160-189     >/=190     09/27/2018 03:10 AM 55 65 - 130 MG/DL Final     VLDL   Date/Time Value Ref Range Status   10/14/2023 08:30 AM 34 0 - 40 mg/dL Final   06/28/2023 03:40 PM 30 0 - 40 mg/dL Final   10/19/2022 08:40 AM 29 0 - 40 mg/dL Final   05/11/2022 08:32 AM 31 0 - 40 mg/dL Final      Last I/O:  I/O last 3 completed shifts:  In: 2493 (39.8 mL/kg) [P.O.:150; I.V.:2343 (37.4 mL/kg)]  Out: 2000 (32 mL/kg) [Urine:2000 (0.9 mL/kg/hr)]  Weight: 62.6 kg     Past Cardiology Tests (Last 3 Years):  EKG:  ECG 12 lead 03/03/2024    Echo:  Transthoracic Echo (TTE) Complete 03/04/2024    Ejection Fractions:  EF   Date/Time Value Ref Range Status   03/04/2024 02:16 PM 60 %      Cath:  No results found for this or any previous visit from the past 1095 days.    Stress Test:  No results found for this or any previous visit from the past 1095 days.    Cardiac Imaging:  No results found for this or any previous visit from the past 1095 days.      Inpatient Medications:  Scheduled medications   Medication Dose Route Frequency    acetaminophen  975 mg oral Once    ALPRAZolam  0.25 mg oral TID    amLODIPine  10 mg oral Daily    aspirin  81 mg oral Daily    atorvastatin  40 mg oral Nightly    azelastine  2 spray Each Nostril BID    clopidogrel  75 mg oral Daily    docusate sodium  100 mg oral BID    fluticasone  2 spray Each Nostril Daily    gabapentin  300 mg oral BID    heparin (porcine)  5,000 Units subcutaneous q8h KAMILAH    losartan  100 mg oral Daily    melatonin  3 mg oral Nightly    metoprolol succinate XL  100 mg oral BID    mirtazapine  15 mg oral Nightly    pantoprazole  40 mg oral Daily before breakfast    Or    pantoprazole   40 mg intravenous Daily before breakfast    polyethylene glycol  17 g oral Daily    ursodiol  300 mg oral BID     PRN medications   Medication    acetaminophen    Or    acetaminophen    Or    acetaminophen    benzocaine-menthol    dextromethorphan-guaifenesin    guaiFENesin    ondansetron ODT    Or    ondansetron     Continuous Medications   Medication Dose Last Rate    sodium chloride 0.9%  75 mL/hr 75 mL/hr (03/05/24 7132)       Physical Exam:  The patient is a somewhat fatigued appearing upper elderly white female lying in bed no respiratory distress nasal oxygen  JVP not elevated carotid impulses 2+ no bruit no palpable thyroid enlargement  Chest mild thoracic kyphosis fair movement breath sounds relatively clear  Cardiac rhythm regular no premature beat S1-S2 normal minimal systolic murmur  Abdomen is soft without focal tenderness no paraspinal megaly  No peripheral edema pedal pulses present        Assessment/Plan     3/4:The patient is a 89-year-old white female with a past medical history including CAD with remote anteroseptal MI with PCI to high-grade mid LAD stenosis 4/19/1995 with repeat cardiac catheterization having been performed 10/12/2008.  She does have a history of hypertension hyperlipidemia type 2 diabetes cholecystectomy remote pulmonary embolism former Coumadin anticoagulation splenic artery aneurysm syncopal episode with head trauma and tiny intracranial subdural hemorrhage, partial colectomy for diverticulitis.  The patient had a recent episode of COVID-19 and possible sinusitis treated with Paxil bed and antibiotics.  She had some improvement but then developed increased weakness recorded low blood pressure and palpitations with rapid heart rate.  She presented to the [facility] emergency room where she was confirmed to have a very prominent sinus tachycardia at 137/min.  Patient had held her usual blood pressure medicines for 1 day including her beta-blocker.  Her tachycardia has  resolved with heart rates now in the 80-90/min range.  She will be continued on metoprolol succinate 100 mg twice daily with discontinuance of atenolol.  Would advise continued IV fluid rehydration for another 24 hours.  Blood pressure readings are acceptable with resumption of her usual amlodipine 10 mg daily losartan 100 mg daily and the beta-blockade.  Will check echocardiogram.    3/5: The patient is sitting in bedside chair feeling somewhat improved not short of breath.  Blood pressure readings remain well within acceptable range on usual amlodipine 10 mg daily losartan 100 mg daily and metoprolol succinate 100 mg twice daily.  Telemetry monitor demonstrates sinus rhythm in the 90s per minute.  CBC is unremarkable and the basic metabolic panel has improved with creatinine decreasing from 1.6-1.0 with IV fluid rehydration which will be discontinued at this time.  The patient's echocardiogram again demonstrated a preserved LV ejection fraction at 60% with 1-2+ mitral valve regurgitation 1+ tricuspid valve regurgitation and aortic valvular sclerosis consistent for age.  Would observe the patient on additional 24-48 hours.        Code Status:  Full Code    I spent  minutes in the professional and overall care of this patient.        David Hernandez MD

## 2024-03-05 NOTE — PROGRESS NOTES
Physical Therapy    Physical Therapy Treatment    Patient Name: Catie Groves  MRN: [id number]  Today's Date: 3/5/2024  Time Calculation  Start Time: 1006  Stop Time: 1036  Time Calculation (min): 30 min       Assessment/Plan   PT Assessment  End of Session Communication: Bedside nurse  End of Session Patient Position: Up in chair (RN '' patient should not need a chair alarm.'')  PT Plan  Inpatient/Swing Bed or Outpatient: Inpatient  PT Plan  Treatment/Interventions: Bed mobility, Transfer training, Gait training, Balance training, Neuromuscular re-education, Strengthening, Endurance training, Range of motion, Therapeutic exercise, Therapeutic activity, Home exercise program  PT Plan: Skilled PT  PT Frequency: 4 times per week  PT Discharge Recommendations: Moderate intensity level of continued care  Equipment Recommended upon Discharge: Wheeled walker  PT Recommended Transfer Status: Assist x1  PT - OK to Discharge: Yes (to next appropriate level of care)      General Visit Information:   PT  Visit  PT Received On: 03/05/24  General  Prior to Session Communication: Bedside nurse  Patient Position Received: Bed, 3 rail up, Alarm on  Preferred Learning Style: verbal  General Comment: Cleared by nursing for therapy, prior to tx patient in supine and agreeable to tx.    Subjective   Precautions:  Precautions  Medical Precautions: Fall precautions, Infection precautions         Objective   Pain:  Pain Assessment  Pain Assessment: 0-10  Pain Score: 4  Pain Type: Acute pain  Pain Location: Leg  Pain Orientation: Left, Inner       Postural Control:  Static Standing Balance  Static Standing-Balance Support: Bilateral upper extremity supported  Static Standing-Level of Assistance: Minimum assistance  Static Standing-Comment/Number of Minutes: Min assist to prevent LOB forward flexed posture.       Treatments:  Therapeutic Exercise  Therapeutic Exercise Performed: Yes  Therapeutic Exercise Activity 1: B seated hip flexion  x15  Therapeutic Exercise Activity 2: B seated knee extension x15  Therapeutic Exercise Activity 3: B seated hip abd/add x15  Therapeutic Exercise Activity 4: B ankle pumps x15  Therapeutic Exercise Activity 5: Glute sets x10    Bed Mobility  Bed Mobility: Yes  Bed Mobility 1  Bed Mobility 1: Supine to sitting  Level of Assistance 1: Minimum assistance  Bed Mobility Comments 1: Min assist trunk up and to scoot hips to EOB with draw sheet.    Ambulation/Gait Training  Ambulation/Gait Training Performed: Yes  Ambulation/Gait Training 1  Surface 1: Level tile  Device 1: No device  Assistance 1: Hand held assistance, Minimum assistance  Quality of Gait 1: Narrow base of support, Shuffling gait  Comments/Distance (ft) 1: 4 steps to bedside chair verbal cues need for foot placement.    Transfers  Transfer: Yes  Transfer 1  Transfer From 1: Sit to  Transfer to 1: Stand  Technique 1: Sit to stand, Stand to sit  Transfer Level of Assistance 1: Hand held assistance, Minimum assistance  Trials/Comments 1: Assist trunk up and down.    Outcome Measures:  [facility] Basic Mobility  Turning from your back to your side while in a flat bed without using bedrails: A little  Moving from lying on your back to sitting on the side of a flat bed without using bedrails: A little  Moving to and from bed to chair (including a wheelchair): A little  Standing up from a chair using your arms (e.g. wheelchair or bedside chair): A little  To walk in hospital room: A lot  Climbing 3-5 steps with railing: Total  Basic Mobility - Total Score: 15        SOPHIA LONNIE, S-PTA         Encounter Problems       Encounter Problems (Active)       PT Problem       Strength (Progressing)       Start:  03/04/24    Expected End:  04/04/24       The patient will demonstrate an overall strength of 4/5 in BLE to assist with completion of functional mobility.           Balance (Progressing)       Start:  03/04/24    Expected End:  04/04/24       The patient will  demonstrate good-/+ dynamic standing balance during activity with LRAD.          Mobility (Progressing)       Start:  03/04/24    Expected End:  04/04/24       The patient will complete functional mobility (bed mobility, transfers, etc.) at a mod indep level with LRAD by DC.           Ambulation (Progressing)       Start:  03/04/24    Expected End:  04/04/24       The patient will be able to ambulate at a mod indep level for >50ftx1 with LRAD.             Pain - Adult

## 2024-03-05 NOTE — PROGRESS NOTES
"Catie Groves is a 89 y.o. female on day 1 of admission presenting with COVID-19.    Subjective   Feeling much better today.  Heart rate is better       Objective     Physical Exam  Vitals reviewed.   Constitutional:       Appearance: Normal appearance.   HENT:      Head: Normocephalic and atraumatic.      Right Ear: Tympanic membrane, ear canal and external ear normal.      Left Ear: Tympanic membrane, ear canal and external ear normal.      Nose: Nose normal.      Mouth/Throat:      Pharynx: Oropharynx is clear.   Eyes:      Extraocular Movements: Extraocular movements intact.      Conjunctiva/sclera: Conjunctivae normal.      Pupils: Pupils are equal, round, and reactive to light.   Cardiovascular:      Rate and Rhythm: Normal rate and regular rhythm.      Pulses: Normal pulses.      Heart sounds: Normal heart sounds.   Pulmonary:      Effort: Pulmonary effort is normal.      Breath sounds: Normal breath sounds.   Abdominal:      General: Abdomen is flat. Bowel sounds are normal.      Palpations: Abdomen is soft.   Musculoskeletal:      Cervical back: Normal range of motion and neck supple.   Skin:     General: Skin is warm and dry.   Neurological:      General: No focal deficit present.      Mental Status: She is alert and oriented to person, place, and time.   Psychiatric:         Mood and Affect: Mood normal.         Last Recorded Vitals  Blood pressure 126/56, pulse 86, temperature 36 °C (96.8 °F), temperature source Temporal, resp. rate 20, height 1.626 m (5' 4\"), weight 62.6 kg (138 lb), SpO2 96 %.  Intake/Output last 3 Shifts:  I/O last 3 completed shifts:  In: 1450 (23.2 mL/kg) [I.V.:1450 (23.2 mL/kg)]  Out: 300 (4.8 mL/kg) [Urine:300 (0.1 mL/kg/hr)]  Weight: 62.6 kg     Relevant Results              Scheduled medications  acetaminophen, 975 mg, oral, Once  ALPRAZolam, 0.25 mg, oral, TID  amLODIPine, 10 mg, oral, Daily  aspirin, 81 mg, oral, Daily  atorvastatin, 40 mg, oral, Nightly  azelastine, 2 " spray, Each Nostril, BID  clopidogrel, 75 mg, oral, Daily  docusate sodium, 100 mg, oral, BID  fluticasone, 2 spray, Each Nostril, Daily  gabapentin, 300 mg, oral, BID  heparin (porcine), 5,000 Units, subcutaneous, q8h KAMILAH  losartan, 100 mg, oral, Daily  melatonin, 3 mg, oral, Nightly  metoprolol succinate XL, 100 mg, oral, BID  mirtazapine, 15 mg, oral, Nightly  pantoprazole, 40 mg, oral, Daily before breakfast   Or  pantoprazole, 40 mg, intravenous, Daily before breakfast  polyethylene glycol, 17 g, oral, Daily  ursodiol, 300 mg, oral, BID      Continuous medications  sodium chloride 0.9%, 75 mL/hr, Last Rate: 75 mL/hr (03/04/24 1800)      PRN medications  PRN medications: acetaminophen **OR** acetaminophen **OR** acetaminophen, benzocaine-menthol, dextromethorphan-guaifenesin, guaiFENesin, ondansetron ODT **OR** ondansetron  Results for orders placed or performed during the hospital encounter of 03/03/24 (from the past 24 hour(s))   POCT GLUCOSE   Result Value Ref Range    POCT Glucose 104 (H) 74 - 99 mg/dL   Serial Troponin, 6 Hour ([facility])   Result Value Ref Range    Troponin T, High Sensitivity 32 (HH) <=14 ng/L   CBC   Result Value Ref Range    WBC 7.5 4.4 - 11.3 x10*3/uL    nRBC 0.0 0.0 - 0.0 /100 WBCs    RBC 3.98 (L) 4.00 - 5.20 x10*6/uL    Hemoglobin 12.2 12.0 - 16.0 g/dL    Hematocrit 38.2 36.0 - 46.0 %    MCV 96 80 - 100 fL    MCH 30.7 26.0 - 34.0 pg    MCHC 31.9 (L) 32.0 - 36.0 g/dL    RDW 13.1 11.5 - 14.5 %    Platelets 310 150 - 450 x10*3/uL   Comprehensive metabolic panel   Result Value Ref Range    Glucose 96 65 - 99 mg/dL    Sodium 139 133 - 145 mmol/L    Potassium 4.0 3.4 - 5.1 mmol/L    Chloride 107 97 - 107 mmol/L    Bicarbonate 19 (L) 24 - 31 mmol/L    Urea Nitrogen 17 8 - 25 mg/dL    Creatinine 1.20 0.40 - 1.60 mg/dL    eGFR 42 (L) >60 mL/min/1.73m*2    Calcium 9.0 8.5 - 10.4 mg/dL    Albumin 3.3 (L) 3.5 - 5.0 g/dL    Alkaline Phosphatase 134 (H) 35 - 125 U/L    Total Protein 6.8 5.9 - 7.9 g/dL     AST 26 5 - 40 U/L    Bilirubin, Total <0.2 0.1 - 1.2 mg/dL    ALT 15 5 - 40 U/L    Anion Gap 13 <=19 mmol/L   POCT GLUCOSE   Result Value Ref Range    POCT Glucose 161 (H) 74 - 99 mg/dL   Transthoracic Echo (TTE) Complete   Result Value Ref Range    AV pk jose 1.16 m/s    LVOT diam 1.90 cm    AV mn grad 3.0 mmHg    MV E/A ratio 0.46     Tricuspid annular plane systolic excursion 2.0 cm    LV biplane EF 60 %    LA vol index A/L 27.1 ml/m2    MV avg E/e' ratio 13.08     RV free wall pk S' 8.70 cm/s    RVSP 22.4 mmHg    LVIDd 4.02 cm    AV pk grad 5.4 mmHg    Aortic Valve Area by Continuity of VTI 1.82 cm2    Aortic Valve Area by Continuity of Peak Velocity 2.08 cm2    LV A4C EF 60.4        Transthoracic Echo (TTE) Complete    Result Date: 3/4/2024           [place] [address]            Phone [phone] TRANSTHORACIC ECHOCARDIOGRAM REPORT  Patient Name:     TELMA MENJIVAR      Reading Physician:  Kaitlynn Zavala MD Study Date:       3/4/2024             Ordering Provider:  [id number] ZAVALA MRN/PID:          [id number]             Fellow: Accession#:       [id number]         Nurse: Date of           3/10/1929 / 89 years Sonographer:        Parag Bolden RDCS Birth/Age: Gender:           F                    Additional Staff: Height:           161.00 cm            Admit Date: Weight:           63.00 kg             Admission Status:   Inpatient - Routine BSA / BMI:        1.66 m2 / 24.30      Department          [facility]                   kg/m2                Location: Blood Pressure: 144 /70 mmHg Study Type:    TRANSTHORACIC ECHO (TTE) COMPLETE Diagnosis/ICD: [id number]-B06.1 Indication:    SOB CPT Codes:     Echo Complete w Full [id number] Patient History: Diabetes:          Yes Pertinent History: Chest Pain, CHF, HTN,  Hyperlipidemia, LE Edema and Murmur.                    Renal dx, PVD, Abn Ekg. Study Detail: The following Echo studies were performed: 2D, M-Mode, Doppler and               color flow. Technically challenging study due to poor acoustic               windows and Covid Status. Unable to obtain suprasternal notch and               subcostal view.  PHYSICIAN INTERPRETATION: Left Ventricle: Left ventricular systolic function is normal, with an estimated ejection fraction of 60%. The calculated ejection fraction is normal at 60 % using the Castillo's Bi-plane MOD calculation. There are no regional wall motion abnormalities. The left ventricular cavity size is normal. Spectral Doppler shows an impaired relaxation pattern of left ventricular diastolic filling. Left Atrium: The left atrium is normal in size. Right Ventricle: The right ventricle is normal in size. There is normal right ventriclar wall thickness. There is normal right ventricular global systolic function. Right Atrium: The right atrium is normal in size. Aortic Valve: The aortic valve is trileaflet. The aortic valve appears tricuspid and non-restricted. There is mild aortic valve cusp calcification. There is evidence of mildly elevated transaortic gradients consistent with sclerosis of the aortic valve. There is trace to mild aortic valve regurgitation. The peak instantaneous gradient of the aortic valve is 5.4 mmHg. The mean gradient of the aortic valve is 3.0 mmHg. Mitral Valve: The mitral valve is normal in structure. There is normal mitral valve leaflet mobility. There is mild mitral annular calcification. There is mild to moderate mitral valve regurgitation which is centrally directed. Tricuspid Valve: The tricuspid valve is structurally normal. There is normal tricuspid valve leaflet mobility. There is mild tricuspid regurgitation. Pulmonic Valve: The pulmonic valve is structurally normal. There is trace to mild pulmonic valve regurgitation.  Pericardium: There is no pericardial effusion noted. There is a pericardial fat pad present. Aorta: The aortic root is normal. There is no dilatation of the aortic arch. There is no dilatation of the ascending aorta. There is no dilatation of the aortic root. There is plaque visualized in the ascending aorta, which is classified as a Grade 2 [mild (focal or diffuse) intimal thickening of 2-3 mm] atherosclerosis. Pulmonary Artery: The pulmonary artery is normal in size. The tricuspid regurgitant velocity is 2.20 m/s, and with an estimated right atrial pressure of 3 mmHg, the estimated pulmonary artery pressure is normal with the RVSP at 22.4 mmHg.  CONCLUSIONS:  1. Left ventricular systolic function is normal with a 60% estimated ejection fraction.  2. Spectral Doppler shows an impaired relaxation pattern of left ventricular diastolic filling.  3. Mild to moderate mitral valve regurgitation.  4. Mild tricuspid regurgitation is visualized.  5. Aortic valve sclerosis.  6. There is plaque visualized in the ascending aorta. QUANTITATIVE DATA SUMMARY: 2D MEASUREMENTS:                          Normal Ranges: LAs:           3.50 cm   (2.7-4.0cm) IVSd:          0.96 cm   (0.6-1.1cm) LVPWd:         0.92 cm   (0.6-1.1cm) LVIDd:         4.02 cm   (3.9-5.9cm) LVIDs:         2.67 cm LV Mass Index: 70.7 g/m2 LV % FS        33.6 % LA VOLUME:                               Normal Ranges: LA Vol A4C:        51.2 ml    (22+/-6mL/m2) LA Vol A2C:        39.8 ml LA Vol BP:         45.2 ml LA Vol Index A4C:  30.8ml/m2 LA Vol Index A2C:  24.0 ml/m2 LA Vol Index BP:   27.1 ml/m2 LA Area A4C:       17.0 cm2 LA Area A2C:       15.0 cm2 LA Major Axis A4C: 4.8 cm LA Major Axis A2C: 4.8 cm LA Volume Index:   26.0 ml/m2 LA Vol A4C:        47.0 ml LA Vol A2C:        39.0 ml RA VOLUME BY A/L METHOD:                       Normal Ranges: RA Area A4C: 13.0 cm2 AORTA MEASUREMENTS:                      Normal Ranges: Ao Sinus, d: 3.30 cm (2.1-3.5cm) LV  SYSTOLIC FUNCTION BY 2D PLANIMETRY (MOD):                     Normal Ranges: EF-A4C View: 60.4 % (>=55%) EF-A2C View: 58.0 % EF-Biplane:  59.6 % LV DIASTOLIC FUNCTION:                        Normal Ranges: MV Peak E:    0.52 m/s (0.7-1.2 m/s) MV Peak A:    1.14 m/s (0.42-0.7 m/s) E/A Ratio:    0.46     (1.0-2.2) MV e'         0.04 m/s (>8.0) MV lateral e' 0.04 m/s MV medial e'  0.04 m/s E/e' Ratio:   13.08    (<8.0) MITRAL VALVE:                 Normal Ranges: MV DT: 206 msec (150-240msec) AORTIC VALVE:                                   Normal Ranges: AoV Vmax:                1.16 m/s (<=1.7m/s) AoV Peak P.4 mmHg (<20mmHg) AoV Mean PG:             3.0 mmHg (1.7-11.5mmHg) LVOT Max Bishop:            0.85 m/s (<=1.1m/s) AoV VTI:                 31.30 cm (18-25cm) LVOT VTI:                20.10 cm LVOT Diameter:           1.90 cm  (1.8-2.4cm) AoV Area, VTI:           1.82 cm2 (2.5-5.5cm2) AoV Area,Vmax:           2.08 cm2 (2.5-4.5cm2) AoV Dimensionless Index: 0.64  RIGHT VENTRICLE: RV Basal 3.00 cm RV Mid   2.71 cm RV Major 5.7 cm TAPSE:   20.0 mm RV s'    0.09 m/s TRICUSPID VALVE/RVSP:                             Normal Ranges: Peak TR Velocity: 2.20 m/s RV Syst Pressure: 22.4 mmHg (< 30mmHg) PULMONIC VALVE:                      Normal Ranges: PV Max Bishop: 0.7 m/s  (0.6-0.9m/s) PV Max P.8 mmHg  [id number] David Hernandez MD Electronically signed on 3/4/2024 at 6:09:50 PM  ** Final **     ECG 12 lead    Result Date: 3/4/2024  Sinus tachycardia Left axis deviation Nonspecific ST abnormality Abnormal ECG When compared with ECG of 03-MAR-2024 12:54, (unconfirmed) T wave inversion no longer evident in Inferior leads Confirmed by David Hernandez ([id number]) on 3/4/2024 9:02:23 AM Also confirmed by James Dhillon ([id number])  on 3/4/2024 9:03:08 AM    CT angio chest for pulmonary embolism    Result Date: 3/3/2024  Interpreted By:  Keerthi Coates, STUDY: CT ANGIO CHEST FOR PULMONARY EMBOLISM; ;  3/3/2024 3:05 pm   INDICATION:  Signs/Symptoms:Tachycardia, positive D-dimer, concern for pulmonary embolism. Shortness of breath   COMPARISON: 10/25/2010   ACCESSION NUMBER(S): [id number]   ORDERING CLINICIAN: DOUGLAS WARNER   TECHNIQUE: CT angiogram performed through the chest following intravenous administration of 75 mL of Omnipaque 350. MIP reconstruction images reformatted in the coronal and sagittal projection   All CT examinations are performed with 1 or more of the following dose reduction techniques: Automated exposure control, adjustment of mA and/or kv according to patient's size, or use of iterative reconstruction techniques.   FINDINGS: Mediastinum demonstrates no lymphadenopathy. Hilar lymphadenopathy demonstrated. Esophagus is unremarkable   Heart and great vessels demonstrate ascending thoracic aorta to measure 2.8 cm. There is moderate vascular calcification of the thoracic aorta. Main pulmonary artery is unremarkable. Central and segmental pulmonary arteries demonstrate no filling defects to suggest the possibility of pulmonary embolus. Characterization peripherally limited secondary to timing of contrast bolus.   Lung parenchyma demonstrates right and left lower lobe linear platelike atelectasis/scar. There is basilar dependent atelectasis. No airspace consolidation. No pleural effusions demonstrated.   Included portions visualized abdomen demonstrate a hypodensity with cyst in the midpole of the right kidney laterally measuring 8 mm. Left kidney visualized portions demonstrates incompletely visualized hypodensity within the midpole laterally measuring 9 mm suggesting renal cysts. Scattered portacaval and portal lymph nodes are demonstrated as seen on the remote CT with portal lymph node identified measuring 10 mm in short axis stable from the remote examination.   Visualized osseous structures demonstrate multilevel degenerative discogenic changes and exaggerated thoracic kyphosis within the spine. Multilevel endplate  sclerosis demonstrated. There is a moderate compression deformity of the T12 vertebral body in particular along the inferior endplate with component of findings as seen on exam from 08/07/2023. However, the compression fracture appears more conspicuous on the current examination           1. No evidence for pulmonary embolus with characterization peripherally limited secondary to timing of the contrast bolus   2. Bilateral basilar linear scar and chronic appearing basilar interstitial change   3. Moderate compression fracture along the T12 vertebral body in particular inferior endplate appearing more conspicuous from CT dated 08/07/2023.     MACRO: None   Signed by: Keerthi Coates 3/3/2024 3:38 PM Dictation workstation:   [id number]    XR chest 2 views    Result Date: 3/3/2024  Interpreted By:  Keerthi Coates, STUDY: XR CHEST 2 VIEWS 3/3/2024 1:23 pm   INDICATION: Signs/Symptoms:tachy   COMPARISON: 03/03/2024   ACCESSION NUMBER(S): [id number]   ORDERING CLINICIAN: DOUGLAS WATSON   TECHNIQUE: Single AP view chest   FINDINGS: Cardiomediastinal silhouette is within normal limits. Aorta is tortuous. There is diffuse vascular calcification of the aortic knob. Lung fields are hyperinflated. No infiltrate or effusion is identified.   There is component of exaggerated thoracic kyphosis. Multilevel anterior and lateral osteophyte formation demonstrated with multilevel endplate sclerosis. Endplate compression along the inferior endplate of the T12 vertebral body is demonstrated.             1. Emphysematous change without acute process.   2. Endplate compression along the inferior endplate of the T12 vertebral body as seen on CT from 08/07/2023   Signed by: Keerthi Coates 3/3/2024 1:42 PM Dictation workstation:   [id number]                  Assessment/Plan   Principal Problem:    COVID-19  Active Problems:    Anxiety    Arteriosclerotic cardiovascular disease    Diabetes mellitus (CMS/HCC)    Benign essential HTN     Spinal stenosis, lumbar region, with neurogenic claudication    Tachycardia    Tachycardia related to missing her metoprolol dose  Today her heart rate is well-controlled   no evidence of MI  Doing well from COVID recovery  Blood sugars okay  Blood pressure okay  PT OT and  for discharge planning       I spent  minutes in the professional and overall care of this patient.      Darya Peguero MD

## 2024-03-05 NOTE — CARE PLAN
Problem: Safety  Goal: Patient will be injury free during hospitalization  Outcome: Progressing  Goal: I will remain free of falls  Outcome: Progressing     Problem: Fall/Injury  Goal: Not fall by end of shift  Outcome: Progressing  Goal: Be free from injury by end of the shift  Outcome: Progressing  Goal: Verbalize understanding of personal risk factors for fall in the hospital  Outcome: Progressing     Problem: Pain - Adult  Goal: Verbalizes/displays adequate comfort level or baseline comfort level  Outcome: Progressing     Problem: Safety - Adult  Goal: Free from fall injury  Outcome: Progressing   The patient's goals for the shift include      The clinical goals for the shift include No falls this shift.

## 2024-03-05 NOTE — PROGRESS NOTES
"Catie Groves is a 89 y.o. female on day 2 of admission presenting with Tachycardia.    Subjective   Complaining of pain in the left leg in the  Just to be she is laying down in the bed       Objective     Physical Exam  Vitals reviewed.   Constitutional:       Appearance: Normal appearance.   HENT:      Head: Normocephalic and atraumatic.      Right Ear: Tympanic membrane, ear canal and external ear normal.      Left Ear: Tympanic membrane, ear canal and external ear normal.      Nose: Nose normal.      Mouth/Throat:      Pharynx: Oropharynx is clear.   Eyes:      Extraocular Movements: Extraocular movements intact.      Conjunctiva/sclera: Conjunctivae normal.      Pupils: Pupils are equal, round, and reactive to light.   Cardiovascular:      Rate and Rhythm: Normal rate and regular rhythm.      Pulses: Normal pulses.      Heart sounds: Normal heart sounds.   Pulmonary:      Effort: Pulmonary effort is normal.      Breath sounds: Normal breath sounds.   Abdominal:      General: Abdomen is flat. Bowel sounds are normal.      Palpations: Abdomen is soft.   Musculoskeletal:         General: Tenderness present.      Cervical back: Normal range of motion and neck supple.      Comments: Numbness in left hip and right knee   Skin:     General: Skin is warm and dry.   Neurological:      General: No focal deficit present.      Mental Status: She is alert and oriented to person, place, and time.   Psychiatric:         Mood and Affect: Mood normal.         Last Recorded Vitals  Blood pressure 121/59, pulse 88, temperature 36.8 °C (98.2 °F), temperature source Temporal, resp. rate 23, height 1.626 m (5' 4\"), weight 62.6 kg (138 lb), SpO2 96 %.  Intake/Output last 3 Shifts:  I/O last 3 completed shifts:  In: 2493 (39.8 mL/kg) [P.O.:150; I.V.:2343 (37.4 mL/kg)]  Out: 2000 (32 mL/kg) [Urine:2000 (0.9 mL/kg/hr)]  Weight: 62.6 kg     Relevant Results              Scheduled medications  acetaminophen, 975 mg, oral, " Once  ALPRAZolam, 0.25 mg, oral, TID  amLODIPine, 10 mg, oral, Daily  aspirin, 81 mg, oral, Daily  atorvastatin, 40 mg, oral, Nightly  azelastine, 2 spray, Each Nostril, BID  clopidogrel, 75 mg, oral, Daily  docusate sodium, 100 mg, oral, BID  fluticasone, 2 spray, Each Nostril, Daily  gabapentin, 300 mg, oral, BID  heparin (porcine), 5,000 Units, subcutaneous, q8h KAMILAH  losartan, 100 mg, oral, Daily  melatonin, 3 mg, oral, Nightly  metoprolol succinate XL, 100 mg, oral, BID  mirtazapine, 15 mg, oral, Nightly  pantoprazole, 40 mg, oral, Daily before breakfast   Or  pantoprazole, 40 mg, intravenous, Daily before breakfast  polyethylene glycol, 17 g, oral, Daily  ursodiol, 300 mg, oral, BID      Continuous medications     PRN medications  PRN medications: acetaminophen **OR** acetaminophen **OR** acetaminophen, benzocaine-menthol, dextromethorphan-guaifenesin, guaiFENesin, ondansetron ODT **OR** ondansetron, traMADol  Results for orders placed or performed during the hospital encounter of 03/03/24 (from the past 24 hour(s))   CBC   Result Value Ref Range    WBC 10.1 4.4 - 11.3 x10*3/uL    nRBC 0.0 0.0 - 0.0 /100 WBCs    RBC 3.87 (L) 4.00 - 5.20 x10*6/uL    Hemoglobin 11.9 (L) 12.0 - 16.0 g/dL    Hematocrit 36.8 36.0 - 46.0 %    MCV 95 80 - 100 fL    MCH 30.7 26.0 - 34.0 pg    MCHC 32.3 32.0 - 36.0 g/dL    RDW 13.0 11.5 - 14.5 %    Platelets 283 150 - 450 x10*3/uL   Basic Metabolic Panel   Result Value Ref Range    Glucose 152 (H) 65 - 99 mg/dL    Sodium 137 133 - 145 mmol/L    Potassium 4.2 3.4 - 5.1 mmol/L    Chloride 106 97 - 107 mmol/L    Bicarbonate 19 (L) 24 - 31 mmol/L    Urea Nitrogen 15 8 - 25 mg/dL    Creatinine 1.00 0.40 - 1.60 mg/dL    eGFR 52 (L) >60 mL/min/1.73m*2    Calcium 9.0 8.5 - 10.4 mg/dL    Anion Gap 12 <=19 mmol/L     Transthoracic Echo (TTE) Complete    Result Date: 3/4/2024           [facility] [id number] [place] [id number]            Phone [phone] TRANSTHORACIC  ECHOCARDIOGRAM REPORT  Patient Name:     TELMA MENJIVAR      Reading Physician:  [id number] David Zavala MD Study Date:       3/4/2024             Ordering Provider:  [id number] DAVID ZAVALA MRN/PID:          [id number]             Fellow: Accession#:       [id number]         Nurse: Date of           3/10/1929 / 89 years Sonographer:        Parag Hernandezkristi RDRAYSA Birth/Age: Gender:           F                    Additional Staff: Height:           161.00 cm            Admit Date: Weight:           63.00 kg             Admission Status:   Inpatient - Routine BSA / BMI:        1.66 m2 / 24.30      Department          [facility]                   kg/m2                Location: Blood Pressure: 144 /70 mmHg Study Type:    TRANSTHORACIC ECHO (TTE) COMPLETE Diagnosis/ICD: [id number] Indication:    SOB CPT Codes:     Echo Complete w Full [id number] Patient History: Diabetes:          Yes Pertinent History: Chest Pain, CHF, HTN, Hyperlipidemia, LE Edema and Murmur.                    Renal dx, PVD, Abn Ekg. Study Detail: The following Echo studies were performed: 2D, M-Mode, Doppler and               color flow. Technically challenging study due to poor acoustic               windows and Covid Status. Unable to obtain suprasternal notch and               subcostal view.  PHYSICIAN INTERPRETATION: Left Ventricle: Left ventricular systolic function is normal, with an estimated ejection fraction of 60%. The calculated ejection fraction is normal at 60 % using the Castillo's Bi-plane MOD calculation. There are no regional wall motion abnormalities. The left ventricular cavity size is normal. Spectral Doppler shows an impaired relaxation pattern of left ventricular diastolic filling. Left Atrium: The left atrium is normal in size. Right Ventricle: The right ventricle is normal in size. There is normal right  ventriclar wall thickness. There is normal right ventricular global systolic function. Right Atrium: The right atrium is normal in size. Aortic Valve: The aortic valve is trileaflet. The aortic valve appears tricuspid and non-restricted. There is mild aortic valve cusp calcification. There is evidence of mildly elevated transaortic gradients consistent with sclerosis of the aortic valve. There is trace to mild aortic valve regurgitation. The peak instantaneous gradient of the aortic valve is 5.4 mmHg. The mean gradient of the aortic valve is 3.0 mmHg. Mitral Valve: The mitral valve is normal in structure. There is normal mitral valve leaflet mobility. There is mild mitral annular calcification. There is mild to moderate mitral valve regurgitation which is centrally directed. Tricuspid Valve: The tricuspid valve is structurally normal. There is normal tricuspid valve leaflet mobility. There is mild tricuspid regurgitation. Pulmonic Valve: The pulmonic valve is structurally normal. There is trace to mild pulmonic valve regurgitation. Pericardium: There is no pericardial effusion noted. There is a pericardial fat pad present. Aorta: The aortic root is normal. There is no dilatation of the aortic arch. There is no dilatation of the ascending aorta. There is no dilatation of the aortic root. There is plaque visualized in the ascending aorta, which is classified as a Grade 2 [mild (focal or diffuse) intimal thickening of 2-3 mm] atherosclerosis. Pulmonary Artery: The pulmonary artery is normal in size. The tricuspid regurgitant velocity is 2.20 m/s, and with an estimated right atrial pressure of 3 mmHg, the estimated pulmonary artery pressure is normal with the RVSP at 22.4 mmHg.  CONCLUSIONS:  1. Left ventricular systolic function is normal with a 60% estimated ejection fraction.  2. Spectral Doppler shows an impaired relaxation pattern of left ventricular diastolic filling.  3. Mild to moderate mitral valve  regurgitation.  4. Mild tricuspid regurgitation is visualized.  5. Aortic valve sclerosis.  6. There is plaque visualized in the ascending aorta. QUANTITATIVE DATA SUMMARY: 2D MEASUREMENTS:                          Normal Ranges: LAs:           3.50 cm   (2.7-4.0cm) IVSd:          0.96 cm   (0.6-1.1cm) LVPWd:         0.92 cm   (0.6-1.1cm) LVIDd:         4.02 cm   (3.9-5.9cm) LVIDs:         2.67 cm LV Mass Index: 70.7 g/m2 LV % FS        33.6 % LA VOLUME:                               Normal Ranges: LA Vol A4C:        51.2 ml    (22+/-6mL/m2) LA Vol A2C:        39.8 ml LA Vol BP:         45.2 ml LA Vol Index A4C:  30.8ml/m2 LA Vol Index A2C:  24.0 ml/m2 LA Vol Index BP:   27.1 ml/m2 LA Area A4C:       17.0 cm2 LA Area A2C:       15.0 cm2 LA Major Axis A4C: 4.8 cm LA Major Axis A2C: 4.8 cm LA Volume Index:   26.0 ml/m2 LA Vol A4C:        47.0 ml LA Vol A2C:        39.0 ml RA VOLUME BY A/L METHOD:                       Normal Ranges: RA Area A4C: 13.0 cm2 AORTA MEASUREMENTS:                      Normal Ranges: Ao Sinus, d: 3.30 cm (2.1-3.5cm) LV SYSTOLIC FUNCTION BY 2D PLANIMETRY (MOD):                     Normal Ranges: EF-A4C View: 60.4 % (>=55%) EF-A2C View: 58.0 % EF-Biplane:  59.6 % LV DIASTOLIC FUNCTION:                        Normal Ranges: MV Peak E:    0.52 m/s (0.7-1.2 m/s) MV Peak A:    1.14 m/s (0.42-0.7 m/s) E/A Ratio:    0.46     (1.0-2.2) MV e'         0.04 m/s (>8.0) MV lateral e' 0.04 m/s MV medial e'  0.04 m/s E/e' Ratio:   13.08    (<8.0) MITRAL VALVE:                 Normal Ranges: MV DT: 206 msec (150-240msec) AORTIC VALVE:                                   Normal Ranges: AoV Vmax:                1.16 m/s (<=1.7m/s) AoV Peak P.4 mmHg (<20mmHg) AoV Mean PG:             3.0 mmHg (1.7-11.5mmHg) LVOT Max Bishop:            0.85 m/s (<=1.1m/s) AoV VTI:                 31.30 cm (18-25cm) LVOT VTI:                20.10 cm LVOT Diameter:           1.90 cm  (1.8-2.4cm) AoV Area, VTI:            1.82 cm2 (2.5-5.5cm2) AoV Area,Vmax:           2.08 cm2 (2.5-4.5cm2) AoV Dimensionless Index: 0.64  RIGHT VENTRICLE: RV Basal 3.00 cm RV Mid   2.71 cm RV Major 5.7 cm TAPSE:   20.0 mm RV s'    0.09 m/s TRICUSPID VALVE/RVSP:                             Normal Ranges: Peak TR Velocity: 2.20 m/s RV Syst Pressure: 22.4 mmHg (< 30mmHg) PULMONIC VALVE:                      Normal Ranges: PV Max Bishop: 0.7 m/s  (0.6-0.9m/s) PV Max P.8 mmHg  [id number] David Hernandez MD Electronically signed on 3/4/2024 at 6:09:50 PM  ** Final **                   Assessment/Plan   Principal Problem:    Tachycardia  Active Problems:    Anxiety    Arteriosclerotic cardiovascular disease    Diabetes mellitus (CMS/HCC)    Benign essential HTN    Spinal stenosis, lumbar region, with neurogenic claudication    COVID-19    DC COVID isolation  Tachycardia better after hydration  Ultram for pain  Blood pressure stable  Blood sugar stable  Patient refusing rehab  Plan discharge tomorrow with home care       I spent  minutes in the professional and overall care of this patient.      Darya Peguero MD

## 2024-03-05 NOTE — CARE PLAN
Problem: Pain  Goal: My pain/discomfort is manageable  Outcome: Progressing     Problem: Safety  Goal: Patient will be injury free during hospitalization  Outcome: Progressing  Goal: I will remain free of falls  Outcome: Progressing     Problem: Daily Care  Goal: Daily care needs are met  Outcome: Progressing     Problem: Psychosocial Needs  Goal: Demonstrates ability to cope with hospitalization/illness  Outcome: Progressing  Goal: Collaborate with me, my family, and caregiver to identify my specific goals  Outcome: Progressing     Problem: Discharge Barriers  Goal: My discharge needs are met  Outcome: Progressing     Problem: Fall/Injury  Goal: Not fall by end of shift  Outcome: Progressing  Goal: Be free from injury by end of the shift  Outcome: Progressing  Goal: Verbalize understanding of personal risk factors for fall in the hospital  Outcome: Progressing     Problem: Pain - Adult  Goal: Verbalizes/displays adequate comfort level or baseline comfort level  Outcome: Progressing     Problem: Safety - Adult  Goal: Free from fall injury  Outcome: Progressing     Problem: Discharge Planning  Goal: Discharge to home or other facility with appropriate resources  Outcome: Progressing     Problem: Chronic Conditions and Co-morbidities  Goal: Patient's chronic conditions and co-morbidity symptoms are monitored and maintained or improved  Outcome: Progressing     Problem: Diabetes  Goal: Achieve decreasing blood glucose levels by end of shift  Outcome: Progressing  Goal: Increase stability of blood glucose readings by end of shift  Outcome: Progressing  Goal: Decrease in ketones present in urine by end of shift  Outcome: Progressing  Goal: Maintain electrolyte levels within acceptable range throughout shift  Outcome: Progressing  Goal: Maintain glucose levels >70mg/dl to <250mg/dl throughout shift  Outcome: Progressing  Goal: No changes in neurological exam by end of shift  Outcome: Progressing  Goal: Learn about and  adhere to nutrition recommendations by end of shift  Outcome: Progressing  Goal: Vital signs within normal range for age by end of shift  Outcome: Progressing  Goal: Increase self care and/or family involovement by end of shift  Outcome: Progressing  Goal: Receive DSME education by end of shift  Outcome: Progressing   The patient's goals for the shift include      The clinical goals for the shift include No falls this shift.    Over the shift, the patient did not make progress toward the following goals. Barriers to progression include generalized weakness. Recommendations to address these barriers include ambulate with assistance.

## 2024-03-06 ENCOUNTER — HOME HEALTH ADMISSION (OUTPATIENT)
Dept: HOME HEALTH SERVICES | Facility: HOME HEALTH | Age: 89
End: 2024-03-06
Payer: MEDICARE

## 2024-03-06 ENCOUNTER — DOCUMENTATION (OUTPATIENT)
Dept: HOME HEALTH SERVICES | Facility: HOME HEALTH | Age: 89
End: 2024-03-06
Payer: MEDICARE

## 2024-03-06 LAB
ANION GAP SERPL CALC-SCNC: 13 MMOL/L
BUN SERPL-MCNC: 21 MG/DL (ref 8–25)
CALCIUM SERPL-MCNC: 8.9 MG/DL (ref 8.5–10.4)
CHLORIDE SERPL-SCNC: 104 MMOL/L (ref 97–107)
CO2 SERPL-SCNC: 18 MMOL/L (ref 24–31)
CREAT SERPL-MCNC: 1.3 MG/DL (ref 0.4–1.6)
EGFRCR SERPLBLD CKD-EPI 2021: 38 ML/MIN/1.73M*2
GLUCOSE SERPL-MCNC: 170 MG/DL (ref 65–99)
POTASSIUM SERPL-SCNC: 4 MMOL/L (ref 3.4–5.1)
SODIUM SERPL-SCNC: 135 MMOL/L (ref 133–145)

## 2024-03-06 PROCEDURE — 36415 COLL VENOUS BLD VENIPUNCTURE: CPT | Performed by: INTERNAL MEDICINE

## 2024-03-06 PROCEDURE — 99232 SBSQ HOSP IP/OBS MODERATE 35: CPT | Performed by: INTERNAL MEDICINE

## 2024-03-06 PROCEDURE — 2500000004 HC RX 250 GENERAL PHARMACY W/ HCPCS (ALT 636 FOR OP/ED): Performed by: INTERNAL MEDICINE

## 2024-03-06 PROCEDURE — 99233 SBSQ HOSP IP/OBS HIGH 50: CPT | Performed by: INTERNAL MEDICINE

## 2024-03-06 PROCEDURE — 2500000001 HC RX 250 WO HCPCS SELF ADMINISTERED DRUGS (ALT 637 FOR MEDICARE OP): Performed by: INTERNAL MEDICINE

## 2024-03-06 PROCEDURE — 97110 THERAPEUTIC EXERCISES: CPT | Mod: GP,CQ

## 2024-03-06 PROCEDURE — 80048 BASIC METABOLIC PNL TOTAL CA: CPT | Performed by: INTERNAL MEDICINE

## 2024-03-06 PROCEDURE — 2060000001 HC INTERMEDIATE ICU ROOM DAILY

## 2024-03-06 PROCEDURE — 97530 THERAPEUTIC ACTIVITIES: CPT | Mod: GP,CQ

## 2024-03-06 PROCEDURE — 2500000005 HC RX 250 GENERAL PHARMACY W/O HCPCS: Performed by: INTERNAL MEDICINE

## 2024-03-06 PROCEDURE — 2500000002 HC RX 250 W HCPCS SELF ADMINISTERED DRUGS (ALT 637 FOR MEDICARE OP, ALT 636 FOR OP/ED): Performed by: INTERNAL MEDICINE

## 2024-03-06 RX ORDER — DEXTROSE MONOHYDRATE AND SODIUM CHLORIDE 5; .45 G/100ML; G/100ML
50 INJECTION, SOLUTION INTRAVENOUS CONTINUOUS
Status: DISCONTINUED | OUTPATIENT
Start: 2024-03-06 | End: 2024-03-07

## 2024-03-06 RX ORDER — TRAMADOL HYDROCHLORIDE 50 MG/1
50 TABLET ORAL EVERY 8 HOURS PRN
Status: DISCONTINUED | OUTPATIENT
Start: 2024-03-06 | End: 2024-03-08 | Stop reason: HOSPADM

## 2024-03-06 RX ADMIN — LOSARTAN POTASSIUM 100 MG: 100 TABLET, FILM COATED ORAL at 10:01

## 2024-03-06 RX ADMIN — AZELASTINE HYDROCHLORIDE 2 SPRAY: 137 SPRAY, METERED NASAL at 21:39

## 2024-03-06 RX ADMIN — URSODIOL 300 MG: 300 CAPSULE ORAL at 21:35

## 2024-03-06 RX ADMIN — METOPROLOL SUCCINATE 100 MG: 100 TABLET, EXTENDED RELEASE ORAL at 10:01

## 2024-03-06 RX ADMIN — AMLODIPINE BESYLATE 10 MG: 10 TABLET ORAL at 10:01

## 2024-03-06 RX ADMIN — DOCUSATE SODIUM 100 MG: 100 CAPSULE, LIQUID FILLED ORAL at 10:01

## 2024-03-06 RX ADMIN — GABAPENTIN 300 MG: 300 CAPSULE ORAL at 10:01

## 2024-03-06 RX ADMIN — MIRTAZAPINE 15 MG: 15 TABLET, FILM COATED ORAL at 21:34

## 2024-03-06 RX ADMIN — Medication 3 MG: at 21:34

## 2024-03-06 RX ADMIN — URSODIOL 300 MG: 300 CAPSULE ORAL at 10:01

## 2024-03-06 RX ADMIN — METOPROLOL SUCCINATE 100 MG: 100 TABLET, EXTENDED RELEASE ORAL at 21:35

## 2024-03-06 RX ADMIN — POLYETHYLENE GLYCOL 3350 17 G: 17 POWDER, FOR SOLUTION ORAL at 10:03

## 2024-03-06 RX ADMIN — TRAMADOL HYDROCHLORIDE 50 MG: 50 TABLET ORAL at 12:36

## 2024-03-06 RX ADMIN — HEPARIN SODIUM 5000 UNITS: 5000 INJECTION, SOLUTION INTRAVENOUS; SUBCUTANEOUS at 14:28

## 2024-03-06 RX ADMIN — Medication: at 04:03

## 2024-03-06 RX ADMIN — PANTOPRAZOLE SODIUM 40 MG: 40 TABLET, DELAYED RELEASE ORAL at 10:01

## 2024-03-06 RX ADMIN — ACETAMINOPHEN 650 MG: 325 TABLET ORAL at 21:41

## 2024-03-06 RX ADMIN — ATORVASTATIN CALCIUM 40 MG: 40 TABLET, FILM COATED ORAL at 21:35

## 2024-03-06 RX ADMIN — GABAPENTIN 300 MG: 300 CAPSULE ORAL at 21:34

## 2024-03-06 RX ADMIN — AZELASTINE HYDROCHLORIDE 2 SPRAY: 137 SPRAY, METERED NASAL at 10:00

## 2024-03-06 RX ADMIN — CLOPIDOGREL BISULFATE 75 MG: 75 TABLET ORAL at 10:01

## 2024-03-06 RX ADMIN — FLUTICASONE PROPIONATE 2 SPRAY: 50 SPRAY, METERED NASAL at 10:00

## 2024-03-06 RX ADMIN — ASPIRIN 81 MG CHEWABLE TABLET 81 MG: 81 TABLET CHEWABLE at 10:01

## 2024-03-06 RX ADMIN — HEPARIN SODIUM 5000 UNITS: 5000 INJECTION, SOLUTION INTRAVENOUS; SUBCUTANEOUS at 06:16

## 2024-03-06 RX ADMIN — HEPARIN SODIUM 5000 UNITS: 5000 INJECTION, SOLUTION INTRAVENOUS; SUBCUTANEOUS at 21:34

## 2024-03-06 RX ADMIN — DEXTROSE MONOHYDRATE AND SODIUM CHLORIDE 50 ML/HR: 5; .45 INJECTION, SOLUTION INTRAVENOUS at 16:56

## 2024-03-06 ASSESSMENT — PAIN - FUNCTIONAL ASSESSMENT
PAIN_FUNCTIONAL_ASSESSMENT: 0-10

## 2024-03-06 ASSESSMENT — COGNITIVE AND FUNCTIONAL STATUS - GENERAL
MOVING TO AND FROM BED TO CHAIR: A LITTLE
CLIMB 3 TO 5 STEPS WITH RAILING: A LOT
MOBILITY SCORE: 12
STANDING UP FROM CHAIR USING ARMS: A LITTLE
TURNING FROM BACK TO SIDE WHILE IN FLAT BAD: A LITTLE
DRESSING REGULAR UPPER BODY CLOTHING: A LITTLE
MOVING TO AND FROM BED TO CHAIR: A LOT
MOBILITY SCORE: 16
PERSONAL GROOMING: A LITTLE
HELP NEEDED FOR BATHING: A LOT
STANDING UP FROM CHAIR USING ARMS: A LOT
HELP NEEDED FOR BATHING: A LOT
WALKING IN HOSPITAL ROOM: A LOT
DRESSING REGULAR LOWER BODY CLOTHING: A LOT
WALKING IN HOSPITAL ROOM: A LOT
STANDING UP FROM CHAIR USING ARMS: A LOT
PERSONAL GROOMING: A LITTLE
DRESSING REGULAR UPPER BODY CLOTHING: A LITTLE
MOVING FROM LYING ON BACK TO SITTING ON SIDE OF FLAT BED WITH BEDRAILS: A LITTLE
DAILY ACTIVITIY SCORE: 17
WALKING IN HOSPITAL ROOM: TOTAL
TURNING FROM BACK TO SIDE WHILE IN FLAT BAD: A LITTLE
MOVING TO AND FROM BED TO CHAIR: A LOT
TOILETING: A LITTLE
CLIMB 3 TO 5 STEPS WITH RAILING: TOTAL
DRESSING REGULAR LOWER BODY CLOTHING: A LOT
MOVING FROM LYING ON BACK TO SITTING ON SIDE OF FLAT BED WITH BEDRAILS: A LITTLE
TURNING FROM BACK TO SIDE WHILE IN FLAT BAD: A LITTLE
DAILY ACTIVITIY SCORE: 17
CLIMB 3 TO 5 STEPS WITH RAILING: TOTAL
TOILETING: A LITTLE
MOBILITY SCORE: 13
MOVING FROM LYING ON BACK TO SITTING ON SIDE OF FLAT BED WITH BEDRAILS: A LITTLE

## 2024-03-06 ASSESSMENT — PAIN SCALES - GENERAL
PAINLEVEL_OUTOF10: 2
PAINLEVEL_OUTOF10: 3
PAINLEVEL_OUTOF10: 4
PAINLEVEL_OUTOF10: 0 - NO PAIN
PAINLEVEL_OUTOF10: 0 - NO PAIN
PAINLEVEL_OUTOF10: 6

## 2024-03-06 ASSESSMENT — PAIN DESCRIPTION - LOCATION
LOCATION: LEG
LOCATION: TEETH

## 2024-03-06 ASSESSMENT — PAIN DESCRIPTION - ORIENTATION: ORIENTATION: LEFT

## 2024-03-06 NOTE — PROGRESS NOTES
Subjective Data:      Overnight Events:         Objective Data:  Last Recorded Vitals:  Vitals:    03/05/24 2244 03/06/24 0300 03/06/24 0403 03/06/24 0700   BP: 137/63 113/50  129/64   BP Location: Right arm Right arm  Right arm   Patient Position: Lying Lying  Lying   Pulse: 87 79  77   Resp: 16 22 22   Temp: 36.1 °C (97 °F) 36.4 °C (97.5 °F)  36.3 °C (97.3 °F)   TempSrc: Temporal Temporal  Oral   SpO2: 94% 90% 92% 92%   Weight:    63 kg (138 lb 14.2 oz)   Height:           Last Labs:  CBC - 3/5/2024:  5:32 AM  10.1 11.9 283    36.8      CMP - 3/6/2024:  4:53 AM  8.9 6.8 26 --- <0.2   3.5 3.3 15 134      PTT - No results in last year.  _   _ _     HGBA1C   Date/Time Value Ref Range Status   10/14/2023 08:30 AM 6.4 see below % Final   06/28/2023 03:40 PM 6.4 % Final     Comment:          Diagnosis of Diabetes-Adults   Non-Diabetic: < or = 5.6%   Increased risk for developing diabetes: 5.7-6.4%   Diagnostic of diabetes: > or = 6.5%  .       Monitoring of Diabetes                Age (y)     Therapeutic Goal (%)   Adults:          >18           <7.0   Pediatrics:    13-18           <7.5                   7-12           <8.0                   0- 6            7.5-8.5   American Diabetes Association. Diabetes Care 33(S1), Jan 2010.   04/15/2023 09:07 AM 7.6 % Final     Comment:          Diagnosis of Diabetes-Adults   Non-Diabetic: < or = 5.6%   Increased risk for developing diabetes: 5.7-6.4%   Diagnostic of diabetes: > or = 6.5%  .       Monitoring of Diabetes                Age (y)     Therapeutic Goal (%)   Adults:          >18           <7.0   Pediatrics:    13-18           <7.5                   7-12           <8.0                   0- 6            7.5-8.5   American Diabetes Association. Diabetes Care 33(S1), Jan 2010.       LDLCALC   Date/Time Value Ref Range Status   10/14/2023 08:30 AM 74 <=99 mg/dL Final     Comment:                                 Near   Borderline      AGE      Desirable  Optimal    High      High     Very High     0-19 Y     0 - 109     ---    110-129   >/= 130     ----    20-24 Y     0 - 119     ---    120-159   >/= 160     ----      >24 Y     0 -  99   100-129  130-159   160-189     >/=190     09/27/2018 03:10 AM 55 65 - 130 MG/DL Final     VLDL   Date/Time Value Ref Range Status   10/14/2023 08:30 AM 34 0 - 40 mg/dL Final   06/28/2023 03:40 PM 30 0 - 40 mg/dL Final   10/19/2022 08:40 AM 29 0 - 40 mg/dL Final   05/11/2022 08:32 AM 31 0 - 40 mg/dL Final      Last I/O:  I/O last 3 completed shifts:  In: 1634.3 (26.1 mL/kg) [P.O.:350; I.V.:1284.3 (20.5 mL/kg)]  Out: 2600 (41.5 mL/kg) [Urine:2600 (1.2 mL/kg/hr)]  Weight: 62.6 kg     Past Cardiology Tests (Last 3 Years):  EKG:  ECG 12 lead 03/03/2024    Echo:  Transthoracic Echo (TTE) Complete 03/04/2024    Ejection Fractions:  EF   Date/Time Value Ref Range Status   03/04/2024 02:16 PM 60 %      Cath:  No results found for this or any previous visit from the past 1095 days.    Stress Test:  No results found for this or any previous visit from the past 1095 days.    Cardiac Imaging:  No results found for this or any previous visit from the past 1095 days.      Inpatient Medications:  Scheduled medications   Medication Dose Route Frequency    acetaminophen  975 mg oral Once    ALPRAZolam  0.25 mg oral TID    amLODIPine  10 mg oral Daily    aspirin  81 mg oral Daily    atorvastatin  40 mg oral Nightly    azelastine  2 spray Each Nostril BID    clopidogrel  75 mg oral Daily    docusate sodium  100 mg oral BID    fluticasone  2 spray Each Nostril Daily    gabapentin  300 mg oral BID    heparin (porcine)  5,000 Units subcutaneous q8h KAMILAH    losartan  100 mg oral Daily    melatonin  3 mg oral Nightly    metoprolol succinate XL  100 mg oral BID    mirtazapine  15 mg oral Nightly    pantoprazole  40 mg oral Daily before breakfast    Or    pantoprazole  40 mg intravenous Daily before breakfast    polyethylene glycol  17 g oral Daily    ursodiol  300 mg oral BID      PRN medications   Medication    acetaminophen    Or    acetaminophen    Or    acetaminophen    benzocaine-menthol    dextromethorphan-guaifenesin    guaiFENesin    ondansetron ODT    Or    ondansetron    oxygen    traMADol     Continuous Medications   Medication Dose Last Rate       Physical Exam:  The patient is a somewhat fatigued appearing upper elderly white female lying in bed no respiratory distress nasal oxygen  JVP not elevated carotid impulses 2+ no bruit no palpable thyroid enlargement  Chest mild thoracic kyphosis fair movement breath sounds relatively clear  Cardiac rhythm regular no premature beat S1-S2 normal minimal systolic murmur  Abdomen is soft without focal tenderness no paraspinal megaly  No peripheral edema pedal pulses present     Assessment/Plan     3/4:The patient is a 89-year-old white female with a past medical history including CAD with remote anteroseptal MI with PCI to high-grade mid LAD stenosis 4/19/1995 with repeat cardiac catheterization having been performed 10/12/2008.  She does have a history of hypertension hyperlipidemia type 2 diabetes cholecystectomy remote pulmonary embolism former Coumadin anticoagulation splenic artery aneurysm syncopal episode with head trauma and tiny intracranial subdural hemorrhage, partial colectomy for diverticulitis.  The patient had a recent episode of COVID-19 and possible sinusitis treated with Paxil bed and antibiotics.  She had some improvement but then developed increased weakness recorded low blood pressure and palpitations with rapid heart rate.  She presented to the [facility] emergency room where she was confirmed to have a very prominent sinus tachycardia at 137/min.  Patient had held her usual blood pressure medicines for 1 day including her beta-blocker.  Her tachycardia has resolved with heart rates now in the 80-90/min range.  She will be continued on metoprolol succinate 100 mg twice daily with discontinuance of atenolol.   Would advise continued IV fluid rehydration for another 24 hours.  Blood pressure readings are acceptable with resumption of her usual amlodipine 10 mg daily losartan 100 mg daily and the beta-blockade.  Will check echocardiogram.     3/5: The patient is sitting in bedside chair feeling somewhat improved not short of breath.  Blood pressure readings remain well within acceptable range on usual amlodipine 10 mg daily losartan 100 mg daily and metoprolol succinate 100 mg twice daily.  Telemetry monitor demonstrates sinus rhythm in the 90s per minute.  CBC is unremarkable and the basic metabolic panel has improved with creatinine decreasing from 1.6-1.0 with IV fluid rehydration which will be discontinued at this time.  The patient's echocardiogram again demonstrated a preserved LV ejection fraction at 60% with 1-2+ mitral valve regurgitation 1+ tricuspid valve regurgitation and aortic valvular sclerosis consistent for age.  Would observe the patient on additional 24-48 hours.    3/6: The patient is resting comfortably sitting in bedside chair not short of breath.  She has declined rehab recommendation in favor of home health care.  Renal parameters remain relatively stable creatinine of 1.3.  Telemetry monitor demonstrates sinus rhythm in the range of 90/min on the metoprolol 100 mg twice daily.  The patient is not on atenolol.  Majority of the blood pressures are in acceptable range and as such she will stay on the usual dosages of amlodipine 10 mg daily losartan 100 mg daily as well.  And as such she will stay on the usual dosages of amlodipine 10 mg daily losartan 100 mg daily as well.  Patient seen by physical therapy and discharge management plans are in progress.  Peripheral IV 03/03/24 20 G Left Antecubital (Active)   Site Assessment Clean;Dry;Intact 03/06/24 0800   Dressing Type Transparent 03/06/24 0800   Dressing Status Clean;Dry;Occlusive 03/06/24 0800   Number of days: 3       Code Status:  Full Code    I  spent 20 minutes in the professional and overall care of this patient.        David Hernandez MD

## 2024-03-06 NOTE — HH CARE COORDINATION
Home Care received a Referral for Nursing, Physical Therapy, and Occupational Therapy. We have processed the referral for a Start of Care on 03/08.     If you have any questions or concerns, please feel free to contact us at [phone]. Follow the prompts, enter your five digit zip code, and you will be directed to your care team on [facility].

## 2024-03-06 NOTE — PROGRESS NOTES
Physical Therapy    Physical Therapy Treatment    Patient Name: Catie Groves  MRN: [id number]  Today's Date: 3/6/2024  Time Calculation  Start Time: 0904  Stop Time: 0936  Time Calculation (min): 32 min       Assessment/Plan   PT Assessment  End of Session Communication: Bedside nurse  End of Session Patient Position: Up in chair  PT Plan  Inpatient/Swing Bed or Outpatient: Inpatient  PT Plan  Treatment/Interventions: Bed mobility, Transfer training, Gait training, Balance training, Neuromuscular re-education, Strengthening, Endurance training, Range of motion, Therapeutic exercise, Therapeutic activity, Home exercise program  PT Plan: Skilled PT  PT Frequency: 4 times per week  PT Discharge Recommendations: Moderate intensity level of continued care  Equipment Recommended upon Discharge: Wheeled walker  PT Recommended Transfer Status: Assist x1  PT - OK to Discharge: Yes (to next appropriate level of care)      General Visit Information:   PT  Visit  PT Received On: 03/06/24  General  Prior to Session Communication: Bedside nurse  Patient Position Received: Bed, 3 rail up, Alarm on  Preferred Learning Style: verbal  General Comment: Cleared by nursing for therapy, prior to tx patient in supine and agreeable to tx.    Subjective     Objective   Pain:  Pain Assessment  Pain Assessment: 0-10  Pain Score: 4  Pain Type: Acute pain  Pain Location: Leg  Pain Orientation: Left, Posterior     Treatments:  Therapeutic Exercise  Therapeutic Exercise Performed: Yes  Therapeutic Exercise Activity 1: B seated hip flexion x20  Therapeutic Exercise Activity 2: B seated knee extension x20  Therapeutic Exercise Activity 3: B seated hip abd/add x20  Therapeutic Exercise Activity 4: B ankle pumps x20  Therapeutic Exercise Activity 5: Glute sets x10    Bed Mobility  Bed Mobility: Yes  Bed Mobility 1  Bed Mobility 1: Supine to sitting  Level of Assistance 1: Close supervision  Bed Mobility Comments 1: Patient needed increased time and  effort to EOB.    Transfer 1  Transfer From 1: Sit to  Transfer to 1: Stand  Technique 1: Sit to stand  Transfer Device 1: Cane  Transfer Level of Assistance 1: Maximum assistance  Trials/Comments 1: Assist trunk up.  Transfers 2  Transfer From 2: Stand to  Transfer to 2: Sit  Technique 2: Stand to sit  Transfer Device 2: Walker  Transfer Level of Assistance 2: Moderate assistance, Moderate verbal cues  Trials/Comments 2: Patient attempted to sit without warning requiring mod cues to not sit down.    Outcome Measures:  [facility] Basic Mobility  Turning from your back to your side while in a flat bed without using bedrails: A little  Moving from lying on your back to sitting on the side of a flat bed without using bedrails: A little  Moving to and from bed to chair (including a wheelchair): A lot  Standing up from a chair using your arms (e.g. wheelchair or bedside chair): A lot  To walk in hospital room: Total  Climbing 3-5 steps with railing: Total  Basic Mobility - Total Score: 12    SOPHIA OLIVAELL, S-PTA             Encounter Problems       Encounter Problems (Active)       PT Problem       Strength (Progressing)       Start:  03/04/24    Expected End:  04/04/24       The patient will demonstrate an overall strength of 4/5 in BLE to assist with completion of functional mobility.           Balance (Progressing)       Start:  03/04/24    Expected End:  04/04/24       The patient will demonstrate good-/+ dynamic standing balance during activity with LRAD.          Mobility (Progressing)       Start:  03/04/24    Expected End:  04/04/24       The patient will complete functional mobility (bed mobility, transfers, etc.) at a mod indep level with LRAD by DC.           Ambulation (Progressing)       Start:  03/04/24    Expected End:  04/04/24       The patient will be able to ambulate at a mod indep level for >50ftx1 with LRAD.             Pain - Adult

## 2024-03-06 NOTE — PROGRESS NOTES
"Catie Groves is a 89 y.o. female on day 3 of admission presenting with Tachycardia.    Subjective   Patient apparently got Ativan last night and tramadol and she has been very groggy.  She is was requiring 5 L of oxygen at night now she is down to 2 L.  She is still quite sleepy.  Not eating and drinking too well       Objective     Physical Exam  Vitals reviewed.   Constitutional:       Appearance: Normal appearance.   HENT:      Head: Normocephalic and atraumatic.      Right Ear: Tympanic membrane, ear canal and external ear normal.      Left Ear: Tympanic membrane, ear canal and external ear normal.      Nose: Nose normal.      Mouth/Throat:      Pharynx: Oropharynx is clear.   Eyes:      Extraocular Movements: Extraocular movements intact.      Conjunctiva/sclera: Conjunctivae normal.      Pupils: Pupils are equal, round, and reactive to light.   Cardiovascular:      Rate and Rhythm: Normal rate and regular rhythm.      Pulses: Normal pulses.      Heart sounds: Normal heart sounds.   Pulmonary:      Effort: Pulmonary effort is normal.      Breath sounds: Normal breath sounds.   Abdominal:      General: Abdomen is flat. Bowel sounds are normal.      Palpations: Abdomen is soft.   Musculoskeletal:      Cervical back: Normal range of motion and neck supple.   Skin:     General: Skin is warm and dry.   Neurological:      General: No focal deficit present.      Mental Status: She is alert and oriented to person, place, and time.   Psychiatric:         Mood and Affect: Mood normal.         Last Recorded Vitals  Blood pressure (!) 120/94, pulse 80, temperature 36.1 °C (97 °F), temperature source Temporal, resp. rate 17, height 1.626 m (5' 4\"), weight 63 kg (138 lb 14.2 oz), SpO2 96 %.  Intake/Output last 3 Shifts:  I/O last 3 completed shifts:  In: 1634.3 (26.1 mL/kg) [P.O.:350; I.V.:1284.3 (20.5 mL/kg)]  Out: 2600 (41.5 mL/kg) [Urine:2600 (1.2 mL/kg/hr)]  Weight: 62.6 kg     Relevant Results            "     Scheduled medications  acetaminophen, 975 mg, oral, Once  amLODIPine, 10 mg, oral, Daily  aspirin, 81 mg, oral, Daily  atorvastatin, 40 mg, oral, Nightly  azelastine, 2 spray, Each Nostril, BID  clopidogrel, 75 mg, oral, Daily  docusate sodium, 100 mg, oral, BID  fluticasone, 2 spray, Each Nostril, Daily  gabapentin, 300 mg, oral, BID  heparin (porcine), 5,000 Units, subcutaneous, q8h KAMILAH  losartan, 100 mg, oral, Daily  melatonin, 3 mg, oral, Nightly  metoprolol succinate XL, 100 mg, oral, BID  mirtazapine, 15 mg, oral, Nightly  pantoprazole, 40 mg, oral, Daily before breakfast   Or  pantoprazole, 40 mg, intravenous, Daily before breakfast  polyethylene glycol, 17 g, oral, Daily  ursodiol, 300 mg, oral, BID      Continuous medications     PRN medications  PRN medications: acetaminophen **OR** acetaminophen **OR** acetaminophen, benzocaine-menthol, dextromethorphan-guaifenesin, guaiFENesin, ondansetron ODT **OR** ondansetron, oxygen, traMADol  Results for orders placed or performed during the hospital encounter of 03/03/24 (from the past 24 hour(s))   Basic Metabolic Panel   Result Value Ref Range    Glucose 170 (H) 65 - 99 mg/dL    Sodium 135 133 - 145 mmol/L    Potassium 4.0 3.4 - 5.1 mmol/L    Chloride 104 97 - 107 mmol/L    Bicarbonate 18 (L) 24 - 31 mmol/L    Urea Nitrogen 21 8 - 25 mg/dL    Creatinine 1.30 0.40 - 1.60 mg/dL    eGFR 38 (L) >60 mL/min/1.73m*2    Calcium 8.9 8.5 - 10.4 mg/dL    Anion Gap 13 <=19 mmol/L                    Assessment/Plan   Principal Problem:    Tachycardia  Active Problems:    Anxiety    Arteriosclerotic cardiovascular disease    Diabetes mellitus (CMS/HCC)    Benign essential HTN    Spinal stenosis, lumbar region, with neurogenic claudication    COVID-19    Patient's home med list had benzodiazepine listed but apparently patient is not taking it  Will stop benzodiazepines  Will stop pain medication  Gentle IV fluids  Wean off oxygen  Will cancel discharge today     I spent   minutes in the professional and overall care of this patient.      Darya Peguero MD

## 2024-03-06 NOTE — CARE PLAN
The patient's goals for the shift include      The clinical goals for the shift include pain management    Over the shift, the patient did not make progress toward the following goals. Barriers to progression include assist with pain management. Recommendations to address these barriers include pain medication at ordered.

## 2024-03-06 NOTE — NURSING NOTE
0730 assumed care for the pt. Resting in the bed. Awaken during am assessment. Denies pain or discomfort. Currently on 5 LNC. Saturation 92-93%. Lungs diminished throughout. Will continue to monitor.  0830 PT/OT in the room. Assisted up to chair at bedside. Pt tolerated well.

## 2024-03-06 NOTE — PROGRESS NOTES
03/06/24 1246   Discharge Planning   Home or Post Acute Services In home services   Type of Home Care Services Home OT;Home PT   Patient expects to be discharged to: Home with  HC     Will need internal referral for home health upon discharge

## 2024-03-06 NOTE — PROGRESS NOTES
IM explained, signed and sent to registration at this time.    [facility] is verifying insurance at this time  Sydni Salgado RN

## 2024-03-07 LAB
ANION GAP SERPL CALC-SCNC: 11 MMOL/L
BUN SERPL-MCNC: 22 MG/DL (ref 8–25)
CALCIUM SERPL-MCNC: 8.9 MG/DL (ref 8.5–10.4)
CHLORIDE SERPL-SCNC: 99 MMOL/L (ref 97–107)
CO2 SERPL-SCNC: 19 MMOL/L (ref 24–31)
CREAT SERPL-MCNC: 1.1 MG/DL (ref 0.4–1.6)
EGFRCR SERPLBLD CKD-EPI 2021: 47 ML/MIN/1.73M*2
ERYTHROCYTE [DISTWIDTH] IN BLOOD BY AUTOMATED COUNT: 13.1 % (ref 11.5–14.5)
GLUCOSE SERPL-MCNC: 204 MG/DL (ref 65–99)
HCT VFR BLD AUTO: 30.1 % (ref 36–46)
HGB BLD-MCNC: 10.2 G/DL (ref 12–16)
MCH RBC QN AUTO: 30.9 PG (ref 26–34)
MCHC RBC AUTO-ENTMCNC: 33.9 G/DL (ref 32–36)
MCV RBC AUTO: 91 FL (ref 80–100)
NRBC BLD-RTO: 0 /100 WBCS (ref 0–0)
PLATELET # BLD AUTO: 272 X10*3/UL (ref 150–450)
POTASSIUM SERPL-SCNC: 4.2 MMOL/L (ref 3.4–5.1)
RBC # BLD AUTO: 3.3 X10*6/UL (ref 4–5.2)
SODIUM SERPL-SCNC: 129 MMOL/L (ref 133–145)
WBC # BLD AUTO: 9.9 X10*3/UL (ref 4.4–11.3)

## 2024-03-07 PROCEDURE — 97110 THERAPEUTIC EXERCISES: CPT | Mod: GP,CQ

## 2024-03-07 PROCEDURE — 2500000001 HC RX 250 WO HCPCS SELF ADMINISTERED DRUGS (ALT 637 FOR MEDICARE OP): Performed by: INTERNAL MEDICINE

## 2024-03-07 PROCEDURE — 80048 BASIC METABOLIC PNL TOTAL CA: CPT | Performed by: INTERNAL MEDICINE

## 2024-03-07 PROCEDURE — 36415 COLL VENOUS BLD VENIPUNCTURE: CPT | Performed by: INTERNAL MEDICINE

## 2024-03-07 PROCEDURE — 99232 SBSQ HOSP IP/OBS MODERATE 35: CPT | Performed by: INTERNAL MEDICINE

## 2024-03-07 PROCEDURE — 2500000002 HC RX 250 W HCPCS SELF ADMINISTERED DRUGS (ALT 637 FOR MEDICARE OP, ALT 636 FOR OP/ED): Performed by: INTERNAL MEDICINE

## 2024-03-07 PROCEDURE — 2500000004 HC RX 250 GENERAL PHARMACY W/ HCPCS (ALT 636 FOR OP/ED): Performed by: INTERNAL MEDICINE

## 2024-03-07 PROCEDURE — 85027 COMPLETE CBC AUTOMATED: CPT | Performed by: INTERNAL MEDICINE

## 2024-03-07 PROCEDURE — 97116 GAIT TRAINING THERAPY: CPT | Mod: GP,CQ

## 2024-03-07 PROCEDURE — 2060000001 HC INTERMEDIATE ICU ROOM DAILY

## 2024-03-07 RX ADMIN — PANTOPRAZOLE SODIUM 40 MG: 40 TABLET, DELAYED RELEASE ORAL at 06:27

## 2024-03-07 RX ADMIN — AZELASTINE HYDROCHLORIDE 2 SPRAY: 137 SPRAY, METERED NASAL at 21:12

## 2024-03-07 RX ADMIN — URSODIOL 300 MG: 300 CAPSULE ORAL at 10:24

## 2024-03-07 RX ADMIN — GABAPENTIN 300 MG: 300 CAPSULE ORAL at 21:12

## 2024-03-07 RX ADMIN — ASPIRIN 81 MG CHEWABLE TABLET 81 MG: 81 TABLET CHEWABLE at 10:25

## 2024-03-07 RX ADMIN — DOCUSATE SODIUM 100 MG: 100 CAPSULE, LIQUID FILLED ORAL at 10:25

## 2024-03-07 RX ADMIN — HEPARIN SODIUM 5000 UNITS: 5000 INJECTION, SOLUTION INTRAVENOUS; SUBCUTANEOUS at 06:24

## 2024-03-07 RX ADMIN — LOSARTAN POTASSIUM 100 MG: 100 TABLET, FILM COATED ORAL at 10:25

## 2024-03-07 RX ADMIN — Medication 3 MG: at 21:13

## 2024-03-07 RX ADMIN — CLOPIDOGREL BISULFATE 75 MG: 75 TABLET ORAL at 10:24

## 2024-03-07 RX ADMIN — HEPARIN SODIUM 5000 UNITS: 5000 INJECTION, SOLUTION INTRAVENOUS; SUBCUTANEOUS at 16:50

## 2024-03-07 RX ADMIN — AMLODIPINE BESYLATE 10 MG: 10 TABLET ORAL at 10:25

## 2024-03-07 RX ADMIN — ACETAMINOPHEN 650 MG: 325 TABLET ORAL at 16:50

## 2024-03-07 RX ADMIN — METOPROLOL SUCCINATE 100 MG: 100 TABLET, EXTENDED RELEASE ORAL at 10:24

## 2024-03-07 RX ADMIN — ATORVASTATIN CALCIUM 40 MG: 40 TABLET, FILM COATED ORAL at 21:00

## 2024-03-07 RX ADMIN — AZELASTINE HYDROCHLORIDE 2 SPRAY: 137 SPRAY, METERED NASAL at 10:24

## 2024-03-07 RX ADMIN — GABAPENTIN 300 MG: 300 CAPSULE ORAL at 10:24

## 2024-03-07 RX ADMIN — METOPROLOL SUCCINATE 100 MG: 100 TABLET, EXTENDED RELEASE ORAL at 21:12

## 2024-03-07 RX ADMIN — URSODIOL 300 MG: 300 CAPSULE ORAL at 21:12

## 2024-03-07 RX ADMIN — HEPARIN SODIUM 5000 UNITS: 5000 INJECTION, SOLUTION INTRAVENOUS; SUBCUTANEOUS at 21:12

## 2024-03-07 RX ADMIN — FLUTICASONE PROPIONATE 2 SPRAY: 50 SPRAY, METERED NASAL at 10:34

## 2024-03-07 ASSESSMENT — COGNITIVE AND FUNCTIONAL STATUS - GENERAL
STANDING UP FROM CHAIR USING ARMS: A LOT
MOVING FROM LYING ON BACK TO SITTING ON SIDE OF FLAT BED WITH BEDRAILS: A LITTLE
DAILY ACTIVITIY SCORE: 17
CLIMB 3 TO 5 STEPS WITH RAILING: TOTAL
STANDING UP FROM CHAIR USING ARMS: A LOT
DRESSING REGULAR UPPER BODY CLOTHING: A LITTLE
CLIMB 3 TO 5 STEPS WITH RAILING: TOTAL
TOILETING: A LITTLE
WALKING IN HOSPITAL ROOM: A LITTLE
MOVING TO AND FROM BED TO CHAIR: A LOT
PERSONAL GROOMING: A LITTLE
TURNING FROM BACK TO SIDE WHILE IN FLAT BAD: A LITTLE
MOVING FROM LYING ON BACK TO SITTING ON SIDE OF FLAT BED WITH BEDRAILS: A LITTLE
DRESSING REGULAR LOWER BODY CLOTHING: A LOT
HELP NEEDED FOR BATHING: A LOT
MOVING TO AND FROM BED TO CHAIR: A LOT
MOBILITY SCORE: 14
TURNING FROM BACK TO SIDE WHILE IN FLAT BAD: A LITTLE
WALKING IN HOSPITAL ROOM: A LITTLE
MOBILITY SCORE: 14

## 2024-03-07 ASSESSMENT — PAIN - FUNCTIONAL ASSESSMENT
PAIN_FUNCTIONAL_ASSESSMENT: 0-10

## 2024-03-07 ASSESSMENT — PAIN SCALES - GENERAL
PAINLEVEL_OUTOF10: 0 - NO PAIN
PAINLEVEL_OUTOF10: 3
PAINLEVEL_OUTOF10: 0 - NO PAIN
PAINLEVEL_OUTOF10: 0 - NO PAIN

## 2024-03-07 ASSESSMENT — PAIN DESCRIPTION - LOCATION: LOCATION: HEAD

## 2024-03-07 NOTE — NURSING NOTE
Received report from Raffy LEDBETTER, assumed care of patient. Patient is sitting up in bed finishing dinner with son at bedside. All needs currently met. Continuing to monitor, call light in reach, bed alarm on.

## 2024-03-07 NOTE — CARE PLAN
Problem: Pain  Goal: My pain/discomfort is manageable  Outcome: Progressing     Problem: Safety  Goal: Patient will be injury free during hospitalization  Outcome: Progressing  Goal: I will remain free of falls  Outcome: Progressing     Problem: Daily Care  Goal: Daily care needs are met  Outcome: Progressing     Problem: Psychosocial Needs  Goal: Demonstrates ability to cope with hospitalization/illness  Outcome: Progressing  Goal: Collaborate with me, my family, and caregiver to identify my specific goals  Outcome: Progressing     Problem: Discharge Barriers  Goal: My discharge needs are met  Outcome: Progressing     Problem: Fall/Injury  Goal: Not fall by end of shift  Outcome: Progressing  Goal: Be free from injury by end of the shift  Outcome: Progressing  Goal: Verbalize understanding of personal risk factors for fall in the hospital  Outcome: Progressing     Problem: Pain - Adult  Goal: Verbalizes/displays adequate comfort level or baseline comfort level  Outcome: Progressing     Problem: Safety - Adult  Goal: Free from fall injury  Outcome: Progressing     Problem: Discharge Planning  Goal: Discharge to home or other facility with appropriate resources  Outcome: Progressing     Problem: Chronic Conditions and Co-morbidities  Goal: Patient's chronic conditions and co-morbidity symptoms are monitored and maintained or improved  Outcome: Progressing     Problem: Diabetes  Goal: Achieve decreasing blood glucose levels by end of shift  Outcome: Progressing  Goal: Increase stability of blood glucose readings by end of shift  Outcome: Progressing  Goal: Decrease in ketones present in urine by end of shift  Outcome: Progressing  Goal: Maintain electrolyte levels within acceptable range throughout shift  Outcome: Progressing  Goal: Maintain glucose levels >70mg/dl to <250mg/dl throughout shift  Outcome: Progressing  Goal: No changes in neurological exam by end of shift  Outcome: Progressing  Goal: Learn about and  adhere to nutrition recommendations by end of shift  Outcome: Progressing  Goal: Vital signs within normal range for age by end of shift  Outcome: Progressing  Goal: Increase self care and/or family involovement by end of shift  Outcome: Progressing  Goal: Receive DSME education by end of shift  Outcome: Progressing   The patient's goals for the shift include      The clinical goals for the shift include Stable VS throughout shift.    Over the shift, the patient did not make progress toward the following goals. Barriers to progression include medications. Recommendations to address these barriers include monitor VS.

## 2024-03-07 NOTE — PROGRESS NOTES
Subjective   Patient ID: Catie Groves is a 89 y.o. female who presents for Cough and sinus congestion.    Patient presents with complaints of having headache sinus congestion chest congestion runny nose  Follow-up on hypertension needs medication refill    Past recap  patient is here because she continues to have pain and funny sensation in the left lower quadrant   Patient is wants her kidneys checked she is worried about the kidneys.  She is also having pain in the right knee having discomfort and having discomfort when walking  She got nerve block from Dr. Quesada for lumbar radiculopathy      Past recap  patient is here for routine follow-up  Her abdominal pain is doing better  She went to the GI and had colonoscopy done which was unremarkable.  Her pain went away by itself   Needs medication refill  Follow-up on hypertension diabetes high cholesterol did blood work     patient is here still having abdominal bloating still having left lower quadrant discomfort and very worried that something is wrong   She is drinking enough water  If you are in the morning but during the day does not pee as much     patient is here because she is still having off-and-on abdominal pain and feels very distended not having good bowel movement in spite of taking lactulose     Patient is here with complaints of having abdominal pain.  Last Wednesday went to the ER urine grew Klebsiella pneumonia treated for UTI  2 weeks ago was very constipated stomach was hurting all over no still gets distended and not having good bowel movement  She is on gabapentin by Dr. quesada  Her back also hurts quite a bit      patient is here for follow-up  Follow-up on coronary artery disease hypertension high cholesterol anxiety  Did blood work  Needs medications  Patient got II nerve blocks back pain is doing little better but she has really suffered from the back pain.  Steroid only helped temporarily      past recap  Blood pressure  Needs  "medication refill  Shingles pain is doing better  Follow-up on coronary artery disease hypertension high cholesterol  Anxiety is doing better sometimes she takes tramadol  Wants nausea medication for occasional use  Wants handicap sticker  Shingles vaccine she is due  Overall doing well        Patient is complaining of having right upper quadrant pain and soreness having loose stools cholestyramine is not agreeing  She is worried that her GI issues are flaring up  She has history of common bile duct stones and irritable bowel disease  She also has history of diverticulitis C. difficile colitis     patient went to see the GI for the abdominal discomfort she was having she had MRI done CAT scans and ultrasound done everything came back normal  She did blood work for cholesterol diabetes  Here for follow-up and needs refills on medications  Overall she is doing great  Wants prescription for Zofran for occasional use      patient fell and broke her nose has a lot of bruises. On Wednesday she was walking in the parking lot to get groceries and she fell on her face  Now she is having pain in the right elbow she did not get x-ray of the elbow done concerned if she has broken the bone  She has sutures on the chin     Patient here for follow-up on hospital stay  Was admitted for TIA symptoms started on Plavix  Her blood pressure was very high last evening she is concerned if Plavix is making her blood pressure goal  She called me early morning hours around 5 spoke to her and after that she felt comfortable and slept  blood pressure is better now   She is also concerned about findings on the mastoid in MRI  Still feels sinuses to be congested       Objective   /72   Ht 1.626 m (5' 4\")   Wt 61.2 kg (135 lb)   BMI 23.17 kg/m²     Physical Exam  Vitals reviewed.   Constitutional:       Appearance: Normal appearance.   HENT:      Head: Normocephalic and atraumatic.      Right Ear: Tympanic membrane, ear canal and " external ear normal.      Left Ear: Tympanic membrane, ear canal and external ear normal.      Nose: Congestion and rhinorrhea present.      Mouth/Throat:      Pharynx: Oropharynx is clear.   Eyes:      Extraocular Movements: Extraocular movements intact.      Conjunctiva/sclera: Conjunctivae normal.      Pupils: Pupils are equal, round, and reactive to light.   Cardiovascular:      Rate and Rhythm: Normal rate and regular rhythm.      Pulses: Normal pulses.      Heart sounds: Normal heart sounds.   Pulmonary:      Effort: Pulmonary effort is normal.      Breath sounds: Normal breath sounds.   Abdominal:      General: Abdomen is flat. Bowel sounds are normal.      Palpations: Abdomen is soft.   Musculoskeletal:      Cervical back: Normal range of motion and neck supple.   Skin:     General: Skin is warm and dry.   Neurological:      General: No focal deficit present.      Mental Status: She is alert and oriented to person, place, and time.   Psychiatric:         Mood and Affect: Mood normal.         Assessment/Plan   Problem List Items Addressed This Visit          ENT    Sinusitis - Primary    Relevant Medications    loratadine (Claritin) 10 mg tablet   past recap  Patient symptoms could be related to gastroparesis  Patient quit taking Reglan  We we will try Reglan again  Try sucralfate to help with the acid reflux  She needs Plavix for her coronary artery disease and I do not think symptoms are caused by Plavix  Increase fiber in the diet  Follow-up if not better     10/21/22     Blood work results reviewed  Cholesterol well controlled  Sugar well controlled  Blood pressure stable  Patient had follow-up with a cardiologist coronary artery disease stable  Advised patient to take Metamucil to help with the loose stool  Explained that not safe to give standing order for antibiotics  Refer to physical therapy for lower back pain and hip pain  Medications refilled  Follow-up in 6 months     4/21/2023  Blood pressure  stable  Creatinine has gone up to 1.69 BUN 42  Encourage patient to drink more water  Cut down salt intake if not better will do ultrasound kidneys and recheck blood work in 3 months  A1c has gone up to 7.6 patient has been really well controlled on diabetes all along  Steroids must be doing it  We will recheck and see if needs medication adjustment  Patient wants to wait until next blood work  Cholesterol is okay  Follow-up blood work in 3 months     7/31/2023  ER records reviewed  BUN 23 creatinine 1.5 potassium 5.1  Last blood work here showed elevated creatinine and potassium was high but at that time patient says she was getting nerve blocks which made her kidneys work well  We will check UA C&S  Try lactulose for constipation  Medrol Dosepak for the back pain  Follow-up if not better     8/7/2023  Patient has distended abdomen  Stat CT of the abdomen pelvis done without contrast because of compromised kidney function  No acute pathology found  Advised patient to take lactulose twice a day or 3 times a day to see if it helps to improve and regularize her bowel movements  Follow-up if not better            8/24/2023  All blood work has been negative  CAT scan has been negative  We will do the ultrasound of the abdomen to make sure there is no urinary retention causing the symptoms  Refer to GI if ultrasound is negative    10/20/2023  Blood pressure stable  Cholesterol well controlled  Diabetes under control  Ursodiol given for bile duct/  Coronary artery disease stable patient to care of cardiologist  Medications refilled  Follow-up in 6 months    2/13/2024  Will check ultrasound kidney and bladder  Will check x-ray of the left hip to see if that is causing the pain will check x-ray of the right knee  Medrol Dosepak to help with the knee pain    2/22/2024  Treat with Zithromax for 10 days  Loratadine 10 mg a day  Steam saline gargles rest fluids  Follow-up if not better

## 2024-03-07 NOTE — PROGRESS NOTES
Physical Therapy    Physical Therapy Treatment    Patient Name: Catie Groves  MRN: [id number]  Today's Date: 3/7/2024  Time Calculation  Start Time: 1003  Stop Time: 1030  Time Calculation (min): 27 min       Assessment/Plan   PT Assessment  End of Session Communication: Bedside nurse  End of Session Patient Position: Up in chair, Alarm off, caregiver present  PT Plan  Inpatient/Swing Bed or Outpatient: Inpatient  PT Plan  Treatment/Interventions: Bed mobility, Transfer training, Gait training, Balance training, Neuromuscular re-education, Strengthening, Endurance training, Range of motion, Therapeutic exercise, Therapeutic activity, Home exercise program  PT Plan: Skilled PT  PT Frequency: 4 times per week  PT Discharge Recommendations: Moderate intensity level of continued care  Equipment Recommended upon Discharge: Wheeled walker  PT Recommended Transfer Status: Assist x1  PT - OK to Discharge: Yes (to next appropriate level of care)      General Visit Information:   PT  Visit  PT Received On: 03/07/24  General  Family/Caregiver Present: Yes  Caregiver Feedback: RN present at the end of treatment.  Prior to Session Communication: Bedside nurse  Patient Position Received: Bed, 3 rail up, Alarm on  Preferred Learning Style: verbal  General Comment: Cleared by nursing for therapy, prior to tx patient in supine and agreeable to tx.    Subjective   Precautions:  Precautions  Medical Precautions: Fall precautions, Infection precautions         Objective   Pain:  Pain Assessment  Pain Assessment: 0-10  Pain Score: 0 - No pain       Treatments:  Therapeutic Exercise  Therapeutic Exercise Performed: Yes  Therapeutic Exercise Activity 1: B seated hip flexion x20  Therapeutic Exercise Activity 2: B seated knee extension x20  Therapeutic Exercise Activity 3: B seated hip abd/add x20  Therapeutic Exercise Activity 4: B ankle pumps x20  Therapeutic Exercise Activity 5: Glute sets x10    Bed Mobility  Bed Mobility: Yes  Bed  Mobility 1  Bed Mobility 1: Supine to sitting  Level of Assistance 1: Close supervision  Bed Mobility Comments 1: Increased time and effort to EOB.    Ambulation/Gait Training 1  Surface 1: Level tile  Device 1: Rolling walker  Assistance 1: Minimum assistance  Quality of Gait 1: Narrow base of support, Shuffling gait  Comments/Distance (ft) 1: 7 steps with RW assist to manuver walker.    Transfers  Transfer: Yes  Transfer 1  Transfer From 1: Sit to  Transfer to 1: Stand  Technique 1: Sit to stand  Transfer Device 1: Walker  Transfer Level of Assistance 1: Moderate assistance  Trials/Comments 1: Mod assist trunk up.  Transfers 2  Transfer From 2: Stand to  Transfer to 2: Sit  Technique 2: Stand to sit  Transfer Device 2: Walker  Transfer Level of Assistance 2: Minimum assistance  Trials/Comments 2: Cues for hand placement.    Outcome Measures:  [facility] Basic Mobility  Turning from your back to your side while in a flat bed without using bedrails: A little  Moving from lying on your back to sitting on the side of a flat bed without using bedrails: A little  Moving to and from bed to chair (including a wheelchair): A lot  Standing up from a chair using your arms (e.g. wheelchair or bedside chair): A lot  To walk in hospital room: A little  Climbing 3-5 steps with railing: Total  Basic Mobility - Total Score: 14      DILOBIE LONNIE, S-PTA         Encounter Problems       Encounter Problems (Active)       PT Problem       Strength (Progressing)       Start:  03/04/24    Expected End:  04/04/24       The patient will demonstrate an overall strength of 4/5 in BLE to assist with completion of functional mobility.           Balance (Progressing)       Start:  03/04/24    Expected End:  04/04/24       The patient will demonstrate good-/+ dynamic standing balance during activity with LRAD.          Mobility (Progressing)       Start:  03/04/24    Expected End:  04/04/24       The patient will complete functional mobility (bed  mobility, transfers, etc.) at a mod indep level with LRAD by DC.           Ambulation (Progressing)       Start:  03/04/24    Expected End:  04/04/24       The patient will be able to ambulate at a mod indep level for >50ftx1 with LRAD.

## 2024-03-07 NOTE — PROGRESS NOTES
"Catie Groves is a 89 y.o. female on day 4 of admission presenting with Tachycardia.    Subjective   More alert and awake off oxygen       Objective     Physical Exam  Vitals reviewed.   Constitutional:       Appearance: Normal appearance.   HENT:      Head: Normocephalic and atraumatic.      Right Ear: Tympanic membrane, ear canal and external ear normal.      Left Ear: Tympanic membrane, ear canal and external ear normal.      Nose: Nose normal.      Mouth/Throat:      Pharynx: Oropharynx is clear.   Eyes:      Extraocular Movements: Extraocular movements intact.      Conjunctiva/sclera: Conjunctivae normal.      Pupils: Pupils are equal, round, and reactive to light.   Cardiovascular:      Rate and Rhythm: Normal rate and regular rhythm.      Pulses: Normal pulses.      Heart sounds: Normal heart sounds.   Pulmonary:      Effort: Pulmonary effort is normal.      Breath sounds: Normal breath sounds.   Abdominal:      General: Abdomen is flat. Bowel sounds are normal.      Palpations: Abdomen is soft.   Musculoskeletal:      Cervical back: Normal range of motion and neck supple.   Skin:     General: Skin is warm and dry.   Neurological:      General: No focal deficit present.      Mental Status: She is alert and oriented to person, place, and time.   Psychiatric:         Mood and Affect: Mood normal.         Last Recorded Vitals  Blood pressure 130/58, pulse 73, temperature 36.3 °C (97.3 °F), temperature source Oral, resp. rate 18, height 1.626 m (5' 4\"), weight 64.6 kg (142 lb 6.7 oz), SpO2 93 %.  Intake/Output last 3 Shifts:  I/O last 3 completed shifts:  In: 1215.8 (18.8 mL/kg) [P.O.:565; I.V.:650.8 (10.1 mL/kg)]  Out: 950 (14.7 mL/kg) [Urine:950 (0.4 mL/kg/hr)]  Weight: 64.6 kg     Relevant Results              Scheduled medications  acetaminophen, 975 mg, oral, Once  amLODIPine, 10 mg, oral, Daily  aspirin, 81 mg, oral, Daily  atorvastatin, 40 mg, oral, Nightly  azelastine, 2 spray, Each Nostril, " BID  clopidogrel, 75 mg, oral, Daily  docusate sodium, 100 mg, oral, BID  fluticasone, 2 spray, Each Nostril, Daily  gabapentin, 300 mg, oral, BID  heparin (porcine), 5,000 Units, subcutaneous, q8h KAMILAH  losartan, 100 mg, oral, Daily  melatonin, 3 mg, oral, Nightly  metoprolol succinate XL, 100 mg, oral, BID  mirtazapine, 15 mg, oral, Nightly  pantoprazole, 40 mg, oral, Daily before breakfast   Or  pantoprazole, 40 mg, intravenous, Daily before breakfast  polyethylene glycol, 17 g, oral, Daily  ursodiol, 300 mg, oral, BID      Continuous medications     PRN medications  PRN medications: acetaminophen **OR** acetaminophen **OR** acetaminophen, benzocaine-menthol, dextromethorphan-guaifenesin, guaiFENesin, ondansetron ODT **OR** ondansetron, oxygen, traMADol  Results for orders placed or performed during the hospital encounter of 03/03/24 (from the past 24 hour(s))   CBC   Result Value Ref Range    WBC 9.9 4.4 - 11.3 x10*3/uL    nRBC 0.0 0.0 - 0.0 /100 WBCs    RBC 3.30 (L) 4.00 - 5.20 x10*6/uL    Hemoglobin 10.2 (L) 12.0 - 16.0 g/dL    Hematocrit 30.1 (L) 36.0 - 46.0 %    MCV 91 80 - 100 fL    MCH 30.9 26.0 - 34.0 pg    MCHC 33.9 32.0 - 36.0 g/dL    RDW 13.1 11.5 - 14.5 %    Platelets 272 150 - 450 x10*3/uL   Basic Metabolic Panel   Result Value Ref Range    Glucose 204 (H) 65 - 99 mg/dL    Sodium 129 (L) 133 - 145 mmol/L    Potassium 4.2 3.4 - 5.1 mmol/L    Chloride 99 97 - 107 mmol/L    Bicarbonate 19 (L) 24 - 31 mmol/L    Urea Nitrogen 22 8 - 25 mg/dL    Creatinine 1.10 0.40 - 1.60 mg/dL    eGFR 47 (L) >60 mL/min/1.73m*2    Calcium 8.9 8.5 - 10.4 mg/dL    Anion Gap 11 <=19 mmol/L                    Assessment/Plan   Principal Problem:    Tachycardia  Active Problems:    Anxiety    Arteriosclerotic cardiovascular disease    Diabetes mellitus (CMS/HCC)    Benign essential HTN    Hyponatremia    Spinal stenosis, lumbar region, with neurogenic claudication    COVID-19    Eating little better today  Off the  oxygen  Confusion is better  Blood pressure stable  Will repeat labs   Patient has changed her mind now she would prefer to go to rehab       I spent  minutes in the professional and overall care of this patient.      Darya Peguero MD

## 2024-03-07 NOTE — PROGRESS NOTES
Subjective Data:      Overnight Events:         Objective Data:  Last Recorded Vitals:  Vitals:    03/06/24 2100 03/07/24 0033 03/07/24 0407 03/07/24 0626   BP: (!) 139/107 109/55 120/57    BP Location: Right arm Right arm Right arm    Patient Position: Lying Lying Lying    Pulse: 92 86 72    Resp: 21 23 18    Temp:  36 °C (96.8 °F) 36 °C (96.8 °F)    TempSrc:  Temporal Temporal    SpO2: 92% 90% 92%    Weight:    64.6 kg (142 lb 6.7 oz)   Height:           Last Labs:  CBC - 3/7/2024:  4:53 AM  9.9 10.2 272    30.1      CMP - 3/7/2024:  4:53 AM  8.9 6.8 26 --- <0.2   3.5 3.3 15 134      PTT - No results in last year.  _   _ _     HGBA1C   Date/Time Value Ref Range Status   10/14/2023 08:30 AM 6.4 see below % Final   06/28/2023 03:40 PM 6.4 % Final     Comment:          Diagnosis of Diabetes-Adults   Non-Diabetic: < or = 5.6%   Increased risk for developing diabetes: 5.7-6.4%   Diagnostic of diabetes: > or = 6.5%  .       Monitoring of Diabetes                Age (y)     Therapeutic Goal (%)   Adults:          >18           <7.0   Pediatrics:    13-18           <7.5                   7-12           <8.0                   0- 6            7.5-8.5   American Diabetes Association. Diabetes Care 33(S1), Jan 2010.   04/15/2023 09:07 AM 7.6 % Final     Comment:          Diagnosis of Diabetes-Adults   Non-Diabetic: < or = 5.6%   Increased risk for developing diabetes: 5.7-6.4%   Diagnostic of diabetes: > or = 6.5%  .       Monitoring of Diabetes                Age (y)     Therapeutic Goal (%)   Adults:          >18           <7.0   Pediatrics:    13-18           <7.5                   7-12           <8.0                   0- 6            7.5-8.5   American Diabetes Association. Diabetes Care 33(S1), Jan 2010.       LDLCALC   Date/Time Value Ref Range Status   10/14/2023 08:30 AM 74 <=99 mg/dL Final     Comment:                                 Near   Borderline      AGE      Desirable  Optimal    High     High     Very High      0-19 Y     0 - 109     ---    110-129   >/= 130     ----    20-24 Y     0 - 119     ---    120-159   >/= 160     ----      >24 Y     0 -  99   100-129  130-159   160-189     >/=190     09/27/2018 03:10 AM 55 65 - 130 MG/DL Final     VLDL   Date/Time Value Ref Range Status   10/14/2023 08:30 AM 34 0 - 40 mg/dL Final   06/28/2023 03:40 PM 30 0 - 40 mg/dL Final   10/19/2022 08:40 AM 29 0 - 40 mg/dL Final   05/11/2022 08:32 AM 31 0 - 40 mg/dL Final      Last I/O:  I/O last 3 completed shifts:  In: 1215.8 (18.8 mL/kg) [P.O.:565; I.V.:650.8 (10.1 mL/kg)]  Out: 950 (14.7 mL/kg) [Urine:950 (0.4 mL/kg/hr)]  Weight: 64.6 kg     Past Cardiology Tests (Last 3 Years):  EKG:  ECG 12 lead 03/03/2024    Echo:  Transthoracic Echo (TTE) Complete 03/04/2024    Ejection Fractions:  EF   Date/Time Value Ref Range Status   03/04/2024 02:16 PM 60 %      Cath:  No results found for this or any previous visit from the past 1095 days.    Stress Test:  No results found for this or any previous visit from the past 1095 days.    Cardiac Imaging:  No results found for this or any previous visit from the past 1095 days.      Inpatient Medications:  Scheduled medications   Medication Dose Route Frequency    acetaminophen  975 mg oral Once    amLODIPine  10 mg oral Daily    aspirin  81 mg oral Daily    atorvastatin  40 mg oral Nightly    azelastine  2 spray Each Nostril BID    clopidogrel  75 mg oral Daily    docusate sodium  100 mg oral BID    fluticasone  2 spray Each Nostril Daily    gabapentin  300 mg oral BID    heparin (porcine)  5,000 Units subcutaneous q8h KAMILAH    losartan  100 mg oral Daily    melatonin  3 mg oral Nightly    metoprolol succinate XL  100 mg oral BID    mirtazapine  15 mg oral Nightly    pantoprazole  40 mg oral Daily before breakfast    Or    pantoprazole  40 mg intravenous Daily before breakfast    polyethylene glycol  17 g oral Daily    ursodiol  300 mg oral BID     PRN medications   Medication    acetaminophen    Or     acetaminophen    Or    acetaminophen    benzocaine-menthol    dextromethorphan-guaifenesin    guaiFENesin    ondansetron ODT    Or    ondansetron    oxygen    traMADol     Continuous Medications   Medication Dose Last Rate       Physical Exam:  The patient is a somewhat fatigued appearing upper elderly white female lying in bed no respiratory distress nasal oxygen  JVP not elevated carotid impulses 2+ no bruit no palpable thyroid enlargement  Chest mild thoracic kyphosis fair movement breath sounds relatively clear  Cardiac rhythm regular no premature beat S1-S2 normal minimal systolic murmur  Abdomen is soft without focal tenderness no paraspinal megaly  No peripheral edema pedal pulses present        Assessment/Plan      3/4:The patient is a 89-year-old white female with a past medical history including CAD with remote anteroseptal MI with PCI to high-grade mid LAD stenosis 4/19/1995 with repeat cardiac catheterization having been performed 10/12/2008.  She does have a history of hypertension hyperlipidemia type 2 diabetes cholecystectomy remote pulmonary embolism former Coumadin anticoagulation splenic artery aneurysm syncopal episode with head trauma and tiny intracranial subdural hemorrhage, partial colectomy for diverticulitis.  The patient had a recent episode of COVID-19 and possible sinusitis treated with Paxil bed and antibiotics.  She had some improvement but then developed increased weakness recorded low blood pressure and palpitations with rapid heart rate.  She presented to the [facility] emergency room where she was confirmed to have a very prominent sinus tachycardia at 137/min.  Patient had held her usual blood pressure medicines for 1 day including her beta-blocker.  Her tachycardia has resolved with heart rates now in the 80-90/min range.  She will be continued on metoprolol succinate 100 mg twice daily with discontinuance of atenolol.  Would advise continued IV fluid rehydration for  another 24 hours.  Blood pressure readings are acceptable with resumption of her usual amlodipine 10 mg daily losartan 100 mg daily and the beta-blockade.  Will check echocardiogram.     3/5: The patient is sitting in bedside chair feeling somewhat improved not short of breath.  Blood pressure readings remain well within acceptable range on usual amlodipine 10 mg daily losartan 100 mg daily and metoprolol succinate 100 mg twice daily.  Telemetry monitor demonstrates sinus rhythm in the 90s per minute.  CBC is unremarkable and the basic metabolic panel has improved with creatinine decreasing from 1.6-1.0 with IV fluid rehydration which will be discontinued at this time.  The patient's echocardiogram again demonstrated a preserved LV ejection fraction at 60% with 1-2+ mitral valve regurgitation 1+ tricuspid valve regurgitation and aortic valvular sclerosis consistent for age.  Would observe the patient on additional 24-48 hours.     3/6: The patient is resting comfortably sitting in bedside chair not short of breath.  She has declined rehab recommendation in favor of home health care.  Renal parameters remain relatively stable creatinine of 1.3.  Telemetry monitor demonstrates sinus rhythm in the range of 90/min on the metoprolol 100 mg twice daily.  The patient is not on atenolol.  Majority of the blood pressures are in acceptable range and as such she will stay on the usual dosages of amlodipine 10 mg daily losartan 100 mg daily as well.  And as such she will stay on the usual dosages of amlodipine 10 mg daily losartan 100 mg daily as well.  Patient seen by physical therapy and discharge management plans are in progress.    3/7: The patient's discharge was delayed yesterday because of the patient's somnolence after receiving Ativan and tramadol the evening prior.  She was very drowsy during the day but feels somewhat better this morning.  She does not require supplemental oxygen.  Stable sinus rhythm in the 70s per  minute by telemetry on usual metoprolol succinate 100 mg twice daily.  Majority of the blood pressure values are well within normal range.  Potentially may reduce the dose of amlodipine from 10 to 5 mg daily in the future.  She does have an outpatient visit scheduled in approximately 3 to 4 weeks.  Basic metabolic panel today is notable for creatinine 1.1 potassium 4.2 glucose of 204.  CBC unremarkable other than mild anemia hematocrit 30.1.  Patient appears prepared from the cardiac standpoint for discharge today with home health care.    Code Status:  Full Code    I spent 20 minutes in the professional and overall care of this patient.        David Hernandez MD

## 2024-03-08 VITALS
DIASTOLIC BLOOD PRESSURE: 52 MMHG | SYSTOLIC BLOOD PRESSURE: 115 MMHG | TEMPERATURE: 97.7 F | RESPIRATION RATE: 17 BRPM | HEIGHT: 64 IN | HEART RATE: 75 BPM | BODY MASS INDEX: 22.77 KG/M2 | WEIGHT: 133.38 LBS | OXYGEN SATURATION: 98 %

## 2024-03-08 LAB
ANION GAP SERPL CALC-SCNC: 12 MMOL/L
BUN SERPL-MCNC: 28 MG/DL (ref 8–25)
CALCIUM SERPL-MCNC: 9.3 MG/DL (ref 8.5–10.4)
CHLORIDE SERPL-SCNC: 101 MMOL/L (ref 97–107)
CO2 SERPL-SCNC: 20 MMOL/L (ref 24–31)
CREAT SERPL-MCNC: 1.3 MG/DL (ref 0.4–1.6)
EGFRCR SERPLBLD CKD-EPI 2021: 38 ML/MIN/1.73M*2
ERYTHROCYTE [DISTWIDTH] IN BLOOD BY AUTOMATED COUNT: 13.1 % (ref 11.5–14.5)
GLUCOSE SERPL-MCNC: 170 MG/DL (ref 65–99)
HCT VFR BLD AUTO: 32.8 % (ref 36–46)
HGB BLD-MCNC: 10.9 G/DL (ref 12–16)
MCH RBC QN AUTO: 30.4 PG (ref 26–34)
MCHC RBC AUTO-ENTMCNC: 33.2 G/DL (ref 32–36)
MCV RBC AUTO: 92 FL (ref 80–100)
NRBC BLD-RTO: 0 /100 WBCS (ref 0–0)
PLATELET # BLD AUTO: 306 X10*3/UL (ref 150–450)
POTASSIUM SERPL-SCNC: 4.3 MMOL/L (ref 3.4–5.1)
RBC # BLD AUTO: 3.58 X10*6/UL (ref 4–5.2)
SODIUM SERPL-SCNC: 133 MMOL/L (ref 133–145)
WBC # BLD AUTO: 8 X10*3/UL (ref 4.4–11.3)

## 2024-03-08 PROCEDURE — 99239 HOSP IP/OBS DSCHRG MGMT >30: CPT | Performed by: INTERNAL MEDICINE

## 2024-03-08 PROCEDURE — 2500000002 HC RX 250 W HCPCS SELF ADMINISTERED DRUGS (ALT 637 FOR MEDICARE OP, ALT 636 FOR OP/ED): Performed by: INTERNAL MEDICINE

## 2024-03-08 PROCEDURE — 36415 COLL VENOUS BLD VENIPUNCTURE: CPT | Performed by: INTERNAL MEDICINE

## 2024-03-08 PROCEDURE — 80048 BASIC METABOLIC PNL TOTAL CA: CPT | Performed by: INTERNAL MEDICINE

## 2024-03-08 PROCEDURE — 2500000004 HC RX 250 GENERAL PHARMACY W/ HCPCS (ALT 636 FOR OP/ED): Performed by: INTERNAL MEDICINE

## 2024-03-08 PROCEDURE — 85027 COMPLETE CBC AUTOMATED: CPT | Performed by: INTERNAL MEDICINE

## 2024-03-08 PROCEDURE — 2500000001 HC RX 250 WO HCPCS SELF ADMINISTERED DRUGS (ALT 637 FOR MEDICARE OP): Performed by: INTERNAL MEDICINE

## 2024-03-08 PROCEDURE — 97110 THERAPEUTIC EXERCISES: CPT | Mod: GO,CO

## 2024-03-08 RX ORDER — ACETAMINOPHEN 325 MG/1
650 TABLET ORAL EVERY 4 HOURS PRN
Qty: 30 TABLET | Refills: 0
Start: 2024-03-08

## 2024-03-08 RX ORDER — PANTOPRAZOLE SODIUM 40 MG/1
40 TABLET, DELAYED RELEASE ORAL
Start: 2024-03-09

## 2024-03-08 RX ORDER — TALC
3 POWDER (GRAM) TOPICAL NIGHTLY
Start: 2024-03-08

## 2024-03-08 RX ORDER — CLOPIDOGREL BISULFATE 75 MG/1
75 TABLET ORAL DAILY
Start: 2024-03-08

## 2024-03-08 RX ORDER — NAPROXEN SODIUM 220 MG/1
81 TABLET, FILM COATED ORAL DAILY
Start: 2024-03-08

## 2024-03-08 RX ORDER — ONDANSETRON 4 MG/1
4 TABLET, ORALLY DISINTEGRATING ORAL EVERY 8 HOURS PRN
Qty: 20 TABLET | Refills: 0 | Status: SHIPPED | OUTPATIENT
Start: 2024-03-08

## 2024-03-08 RX ORDER — TRAMADOL HYDROCHLORIDE 50 MG/1
50 TABLET ORAL EVERY 8 HOURS PRN
Qty: 10 TABLET | Refills: 0 | Status: SHIPPED
Start: 2024-03-08

## 2024-03-08 RX ADMIN — URSODIOL 300 MG: 300 CAPSULE ORAL at 09:03

## 2024-03-08 RX ADMIN — GABAPENTIN 300 MG: 300 CAPSULE ORAL at 09:03

## 2024-03-08 RX ADMIN — ASPIRIN 81 MG CHEWABLE TABLET 81 MG: 81 TABLET CHEWABLE at 09:03

## 2024-03-08 RX ADMIN — AMLODIPINE BESYLATE 10 MG: 10 TABLET ORAL at 09:03

## 2024-03-08 RX ADMIN — LOSARTAN POTASSIUM 100 MG: 100 TABLET, FILM COATED ORAL at 09:04

## 2024-03-08 RX ADMIN — HEPARIN SODIUM 5000 UNITS: 5000 INJECTION, SOLUTION INTRAVENOUS; SUBCUTANEOUS at 06:46

## 2024-03-08 RX ADMIN — CLOPIDOGREL BISULFATE 75 MG: 75 TABLET ORAL at 09:03

## 2024-03-08 RX ADMIN — METOPROLOL SUCCINATE 100 MG: 100 TABLET, EXTENDED RELEASE ORAL at 09:03

## 2024-03-08 RX ADMIN — PANTOPRAZOLE SODIUM 40 MG: 40 TABLET, DELAYED RELEASE ORAL at 06:46

## 2024-03-08 RX ADMIN — DOCUSATE SODIUM 100 MG: 100 CAPSULE, LIQUID FILLED ORAL at 09:03

## 2024-03-08 ASSESSMENT — PAIN SCALES - GENERAL
PAINLEVEL_OUTOF10: 0 - NO PAIN

## 2024-03-08 ASSESSMENT — COGNITIVE AND FUNCTIONAL STATUS - GENERAL
DRESSING REGULAR UPPER BODY CLOTHING: A LITTLE
HELP NEEDED FOR BATHING: A LOT
CLIMB 3 TO 5 STEPS WITH RAILING: TOTAL
MOVING TO AND FROM BED TO CHAIR: A LOT
WALKING IN HOSPITAL ROOM: A LITTLE
DRESSING REGULAR LOWER BODY CLOTHING: A LOT
TOILETING: A LOT
DRESSING REGULAR UPPER BODY CLOTHING: A LITTLE
PERSONAL GROOMING: A LITTLE
TURNING FROM BACK TO SIDE WHILE IN FLAT BAD: A LITTLE
EATING MEALS: A LITTLE
DRESSING REGULAR LOWER BODY CLOTHING: A LOT
PERSONAL GROOMING: A LITTLE
HELP NEEDED FOR BATHING: A LOT
TOILETING: A LITTLE
DAILY ACTIVITIY SCORE: 15
MOVING FROM LYING ON BACK TO SITTING ON SIDE OF FLAT BED WITH BEDRAILS: A LITTLE
STANDING UP FROM CHAIR USING ARMS: A LOT

## 2024-03-08 ASSESSMENT — PAIN - FUNCTIONAL ASSESSMENT
PAIN_FUNCTIONAL_ASSESSMENT: 0-10

## 2024-03-08 NOTE — PROGRESS NOTES
03/08/24 1213   Discharge Planning   Home or Post Acute Services Post acute facilities (Rehab/SNF/etc)   Type of Post Acute Facility Services Rehab   Patient expects to be discharged to: [facility] no precert needed   Does the patient need discharge transport arranged? Yes   RoundTrip coordination needed? Yes   Patient Choice   Provider Choice list and CMS website (https://medicare.gov/care-compare#search) for post-acute Quality and Resource Measure Data were provided and reviewed with: [facility] does not have bed at this time   Dr islas made aware [facility] if medically ready

## 2024-03-08 NOTE — PROGRESS NOTES
Occupational Therapy    OT Treatment    Patient Name: Catie Groves  MRN: [id number]  Today's Date: 3/8/2024  Time Calculation  Start Time: 1122  Stop Time: 1135  Time Calculation (min): 13 min         Assessment:  OT Assessment: Minimal progress made towards OT goals. Continue with current OT POC to increase strength, balance and functional tolerance to maximize safety and independence during ADLs.  Evaluation/Treatment Tolerance: Other (Comment) (Session limited d/t pt participation)  End of Session Communication: Bedside nurse  End of Session Patient Position: Up in chair, Alarm on (all needs in reach)  OT Assessment Results: Decreased ADL status, Decreased upper extremity strength, Decreased safe judgment during ADL, Decreased cognition, Decreased endurance, Decreased functional mobility, Decreased gross motor control, Decreased IADLs  Evaluation/Treatment Tolerance: Other (Comment) (Session limited d/t pt participation)  Plan:  Treatment Interventions: ADL retraining, Functional transfer training, UE strengthening/ROM, Endurance training, Cognitive reorientation, Patient/family training, Equipment evaluation/education  OT Frequency: 3 times per week  OT Discharge Recommendations: Moderate intensity level of continued care  OT - OK to Discharge: Yes  Treatment Interventions: ADL retraining, Functional transfer training, UE strengthening/ROM, Endurance training, Cognitive reorientation, Patient/family training, Equipment evaluation/education    Subjective   Previous Visit Info:  OT Last Visit  OT Received On: 03/08/24  General:  General  Reason for Referral: activities of daily living, COVID 19  Past Medical History Relevant to Rehab: Gastroparesis, Acute Myocardial Infarction, Pulmonary Embolism, Total Occlusion Of The Femoral Artery,  Peripheral vascular angioplasty, hypertension, peripheral vascular disease esophageal reflux, cataract diabetic neuropathy type 2 diabetes mellitus  Prior to Session  "Communication: Bedside nurse  Patient Position Received: Up in chair, Alarm on  General Comment: Pt cleared for OT session per nursing, limited session completed this date d/t pt participation.  Precautions:  Hearing/Visual Limitations: mild [place]; glasses  Medical Precautions: Fall precautions, Infection precautions (Covid+)  Vital Signs:     Pain:  Pain Assessment  Pain Assessment: 0-10  Pain Score: 0 - No pain  Clinical Progression: Not changed    Objective    Cognition:  Cognition  Overall Cognitive Status: Within Functional Limits  Orientation Level: Oriented X4  Processing Speed: Within funtional limits  Coordination:  Movements are Fluid and Coordinated: Yes  Activities of Daily Living: Grooming  Grooming Comments: pt declined participation in ADLs despite education and encouragement.  Functional Standing Tolerance:     Bed Mobility/Transfers: Bed Mobility  Bed Mobility: No    Transfers  Transfer: No (pt adamantly declining participation in functional transfers nd/t reports of \"not feeling like it\". Education and encouragement provided however pt continued to decline. Pt agreeable to BUE exercises.)    Therapy/Activity: Therapeutic Exercise  Therapeutic Exercise Performed: Yes  Therapeutic Exercise Activity 1: BUE exercises completed 2x10 in all planes against gravity to increase strength and functional tolerance to maximize safety and ease of ADL/ADL transfer completion. Verbal instruction and demonstration provided to ensure proper muscle recruitment.      Outcome Measures:[facility] Daily Activity  Putting on and taking off regular lower body clothing: A lot  Bathing (including washing, rinsing, drying): A lot  Putting on and taking off regular upper body clothing: A little  Toileting, which includes using toilet, bedpan or urinal: A lot  Taking care of personal grooming such as brushing teeth: A little  Eating Meals: A little  Daily Activity - Total Score: 15        Education Documentation  Precautions, taught " by GARRY Solorzano at 3/8/2024  4:49 PM.  Learner: Patient  Readiness: Acceptance  Method: Explanation, Demonstration  Response: Needs Reinforcement    Home Exercise Program, taught by GARRY Solorzano at 3/8/2024  4:49 PM.  Learner: Patient  Readiness: Acceptance  Method: Explanation, Demonstration  Response: Needs Reinforcement    ADL Training, taught by GARRY Solorzano at 3/8/2024  4:49 PM.  Learner: Patient  Readiness: Acceptance  Method: Explanation, Demonstration  Response: Needs Reinforcement    Education Comments  Education provided on role of OT/POC, safety awareness throughout functional tasks/transfers, importance of activity/ rest routine, and use of call light for assistance. Questions, comments and concerns addressed regarding OT.      Goals:  Encounter Problems       Encounter Problems (Active)       OT Goals       ADLs (Progressing)       Start:  03/04/24    Expected End:  03/29/24       Pt will complete ADL tasks with Mod I, using AE as needed, in order to complete self-care tasks.           Transfers (Progressing)       Start:  03/04/24    Expected End:  03/29/24       Pt will perform functional mobility household distance independently           Functional Mobility (Progressing)       Start:  03/04/24    Expected End:  03/29/24       Pt will perform functional mobility household distance independently.           therapeutic exercise (Progressing)       Start:  03/04/24    Expected End:  03/29/24       Pt will perform upper body therapeutic exercises all joints/planes of motion independently to increase aerobic capacity

## 2024-03-08 NOTE — CARE PLAN
The patient's goals for the shift include      The clinical goals for the shift include maintain safety    Over the shift, the patient did not make progress toward the following goals. Barriers to progression include weakness. Recommendations to address these barriers include bed alarm activated, work with PT.

## 2024-03-08 NOTE — PROGRESS NOTES
TCC spoke to patient, updated regarding therapy recommendations. Patient agreeable to SNF at this time. List provide to review and make choices. TCC will follow for discharge planning.    Savanah monson   Referrals sent at this time patient medically clear     Sydni Salgado RN

## 2024-03-08 NOTE — PROGRESS NOTES
03/08/24 1423   Discharge Planning   Has discharge transport been arranged? Yes   What day is the transport expected? 03/08/24   What time is the transport expected? [id number]

## 2024-03-09 NOTE — PROGRESS NOTES
"Catie Groves is a 89 y.o. female on day 5 of admission presenting with Tachycardia.    Subjective   Feeling a lot better today but still very weak and unsteady on the feet and would prefer to go to rehab       Objective     Physical Exam  Vitals reviewed.   Constitutional:       Appearance: Normal appearance.   HENT:      Head: Normocephalic and atraumatic.      Right Ear: Tympanic membrane, ear canal and external ear normal.      Left Ear: Tympanic membrane, ear canal and external ear normal.      Nose: Nose normal.      Mouth/Throat:      Pharynx: Oropharynx is clear.   Eyes:      Extraocular Movements: Extraocular movements intact.      Conjunctiva/sclera: Conjunctivae normal.      Pupils: Pupils are equal, round, and reactive to light.   Cardiovascular:      Rate and Rhythm: Normal rate and regular rhythm.      Pulses: Normal pulses.      Heart sounds: Normal heart sounds.   Pulmonary:      Effort: Pulmonary effort is normal.      Breath sounds: Normal breath sounds.   Abdominal:      General: Abdomen is flat. Bowel sounds are normal.      Palpations: Abdomen is soft.   Musculoskeletal:      Cervical back: Normal range of motion and neck supple.   Skin:     General: Skin is warm and dry.   Neurological:      General: No focal deficit present.      Mental Status: She is alert and oriented to person, place, and time.   Psychiatric:         Mood and Affect: Mood normal.         Last Recorded Vitals  Blood pressure 115/52, pulse 75, temperature 36.5 °C (97.7 °F), temperature source Temporal, resp. rate 17, height 1.626 m (5' 4\"), weight 60.5 kg (133 lb 6.1 oz), SpO2 98 %.  Intake/Output last 3 Shifts:  I/O last 3 completed shifts:  In: 420 (6.9 mL/kg) [P.O.:420]  Out: 1900 (31.4 mL/kg) [Urine:1900 (0.9 mL/kg/hr)]  Weight: 60.5 kg     Relevant Results                             Assessment/Plan   Principal Problem:    Tachycardia  Active Problems:    Anxiety    Arteriosclerotic cardiovascular disease    Diabetes " mellitus (CMS/HCC)    Benign essential HTN    Hyponatremia    Spinal stenosis, lumbar region, with neurogenic claudication    COVID-19    Blood work reviewed  Sodium better  Encourage p.o. fluids  Anemia stable  Blood sugars okay  Heart rate better  [place] filled  Discharge papers done for rehab       I spent 35 minutes in the professional and overall care of this patient.      Darya Peguero MD

## 2024-03-09 NOTE — DISCHARGE SUMMARY
Discharge Diagnosis  Tachycardia    Issues Requiring Follow-Up  Recent COVID  Hypertension  Coronary artery disease  Diabetes    Test Results Pending At Discharge  Pending Labs       No current pending labs.            Hospital Course   Catie Groves is a 89 y.o. female presenting with palpitations and rapid heart rate.  Patient was recently diagnosed with COVID treated with Paxlovid.  Treated with antibiotic for sinus congestion and fatigue she has been doing better her cough is doing better but yesterday her blood pressure was running low she did not feel good felt nauseous she did not take her blood pressure medication , later in the day she was feeling palpitations but this morning when she woke up she was having palpitations and her heart rate was very high.  In the emergency room patient was found to be tachycardic also patient had hypoglycemia and acidosis with anion gap of 19.  Patient was given IV fluids.  CT chest no blood clot.  Repeat blood work improving glucose and anion gap.  Patient continues to have tachycardia after fluid but she is due for metoprolol dose   Patient's heart rate was better after IV fluids and resuming metoprolol.  She was slowly recovering from her dehydration.  She was quite weak.  She got Ativan and Ultram and together it made her very groggy and she needed oxygen.  Medications discontinued with improvement in her oxygenation.  Patient was very weak and decided to go to rehab as she lives alone.  Discharged to [facility]    Pertinent Physical Exam At Time of Discharge  Physical Exam  Vitals reviewed.   Constitutional:       Appearance: Normal appearance.   HENT:      Head: Normocephalic and atraumatic.      Right Ear: Tympanic membrane, ear canal and external ear normal.      Left Ear: Tympanic membrane, ear canal and external ear normal.      Nose: Nose normal.      Mouth/Throat:      Pharynx: Oropharynx is clear.   Eyes:      Extraocular Movements: Extraocular  movements intact.      Conjunctiva/sclera: Conjunctivae normal.      Pupils: Pupils are equal, round, and reactive to light.   Cardiovascular:      Rate and Rhythm: Normal rate and regular rhythm.      Pulses: Normal pulses.      Heart sounds: Normal heart sounds.   Pulmonary:      Effort: Pulmonary effort is normal.      Breath sounds: Normal breath sounds.   Abdominal:      General: Abdomen is flat. Bowel sounds are normal.      Palpations: Abdomen is soft.   Musculoskeletal:      Cervical back: Normal range of motion and neck supple.   Skin:     General: Skin is warm and dry.   Neurological:      General: No focal deficit present.      Mental Status: She is alert and oriented to person, place, and time.   Psychiatric:         Mood and Affect: Mood normal.         Home Medications     Medication List      START taking these medications     acetaminophen 325 mg tablet; Commonly known as: Tylenol; Take 2 tablets   (650 mg) by mouth every 4 hours if needed for mild pain (1 - 3).   melatonin 3 mg tablet; Take 1 tablet (3 mg) by mouth once daily at   bedtime.   ondansetron ODT 4 mg disintegrating tablet; Commonly known as:   Zofran-ODT; Take 1 tablet (4 mg) by mouth every 8 hours if needed for   nausea.   traMADol 50 mg tablet; Commonly known as: Ultram; Take 1 tablet (50 mg)   by mouth every 8 hours if needed for moderate pain (4 - 6) or severe pain   (7 - 10).     CHANGE how you take these medications     mirtazapine 15 mg tablet; Commonly known as: Remeron; What changed:   Another medication with the same name was removed. Continue taking this   medication, and follow the directions you see here.   * pantoprazole 40 mg EC tablet; Commonly known as: ProtoNix; Take 1   tablet (40 mg) by mouth once daily in the morning. Take before meals.;   What changed: Another medication with the same name was added. Make sure   you understand how and when to take each.   * pantoprazole 40 mg EC tablet; Commonly known as: ProtoNix;  Take 1   tablet (40 mg) by mouth once daily in the morning. Take before meals. Do   not crush, chew, or split. Do not start before March 9, 2024.; Start   taking on: March 9, 2024; What changed: You were already taking a   medication with the same name, and this prescription was added. Make sure   you understand how and when to take each.  * This list has 2 medication(s) that are the same as other medications   prescribed for you. Read the directions carefully, and ask your doctor or   other care provider to review them with you.     CONTINUE taking these medications     amLODIPine 10 mg tablet; Commonly known as: Norvasc   aspirin 81 mg chewable tablet; Chew 1 tablet (81 mg) once daily.   atorvastatin 40 mg tablet; Commonly known as: Lipitor   azelastine 137 mcg (0.1 %) nasal spray; Commonly known as: Astelin   clopidogrel 75 mg tablet; Commonly known as: Plavix; Take 1 tablet (75   mg) by mouth once daily.   docusate sodium 100 mg capsule; Commonly known as: Colace   fluticasone 50 mcg/actuation nasal spray; Commonly known as: Flonase   gabapentin 300 mg capsule; Commonly known as: Neurontin   glipiZIDE 5 mg tablet; Commonly known as: Glucotrol; Take 1 tablet (5   mg) by mouth once daily.   lancets misc; Commonly known as: Microlet Lancet; Use to test 2-3 daily.   loratadine 10 mg tablet; Commonly known as: Claritin; Take 1 tablet (10   mg) by mouth once daily.   losartan 100 mg tablet; Commonly known as: Cozaar   metoprolol succinate  mg 24 hr tablet; Commonly known as:   Toprol-XL   nitroglycerin 0.4 mg SL tablet; Commonly known as: Nitrostat   polyethylene glycol 17 gram/dose powder; Commonly known as: Glycolax,   Miralax   PROBIOTIC ORAL   Reglan 5 mg tablet; Generic drug: metoclopramide   ursodiol 300 mg capsule; Commonly known as: Actigall; Take 1 capsule   (300 mg) by mouth 2 times a day.   VISION PLUS LUTEIN ORAL   Vitamin D3 50 mcg (2,000 unit) capsule; Generic drug: cholecalciferol     STOP taking  these medications     atenolol 100 mg tablet; Commonly known as: Tenormin   azithromycin 500 mg tablet; Commonly known as: Zithromax   Contour Test Strips strip; Generic drug: blood sugar diagnostic   cyclobenzaprine 10 mg tablet; Commonly known as: Flexeril   lactulose 20 gram/30 mL oral solution   methylPREDNISolone 4 mg tablets; Commonly known as: Medrol Dospak   nirmatrelvir-ritonavir 300 mg (150 mg x 2)-100 mg tablet therapy pack;   Commonly known as: PAXLOVID   VANCOMYCIN ORAL   Xanax 0.25 mg tablet; Generic drug: ALPRAZolam   Zocor 40 mg tablet; Generic drug: simvastatin       Outpatient Follow-Up  Future Appointments   Date Time Provider Department Center   3/29/2024 11:30 AM David Hernandez MD [id number] [facility]   4/16/2024  9:30 AM Kaden Elena OD [id number] [facility]   4/19/2024  9:45 AM David Hernandez MD [id number] [facility]   4/19/2024 11:30 AM Darya Peguero MD [id number] [facility] Peguero MD

## 2024-03-10 ENCOUNTER — NURSING HOME VISIT (OUTPATIENT)
Dept: POST ACUTE CARE | Facility: EXTERNAL LOCATION | Age: 89
End: 2024-03-10
Payer: MEDICARE

## 2024-03-10 DIAGNOSIS — I10 HYPERTENSION, UNSPECIFIED TYPE: ICD-10-CM

## 2024-03-10 DIAGNOSIS — U07.1 COVID-19: Primary | ICD-10-CM

## 2024-03-10 DIAGNOSIS — R00.0 TACHYCARDIA: ICD-10-CM

## 2024-03-10 DIAGNOSIS — K21.9 GASTROESOPHAGEAL REFLUX DISEASE WITHOUT ESOPHAGITIS: ICD-10-CM

## 2024-03-10 DIAGNOSIS — I26.99 PULMONARY EMBOLISM, UNSPECIFIED CHRONICITY, UNSPECIFIED PULMONARY EMBOLISM TYPE, UNSPECIFIED WHETHER ACUTE COR PULMONALE PRESENT (MULTI): ICD-10-CM

## 2024-03-10 DIAGNOSIS — E11.9 TYPE 2 DIABETES MELLITUS WITHOUT RETINOPATHY (MULTI): ICD-10-CM

## 2024-03-10 DIAGNOSIS — Z91.81 AT RISK FOR FALLS: ICD-10-CM

## 2024-03-10 DIAGNOSIS — I21.9 ACUTE MYOCARDIAL INFARCTION, UNSPECIFIED MI TYPE, UNSPECIFIED ARTERY (MULTI): ICD-10-CM

## 2024-03-10 DIAGNOSIS — G62.9 NEUROPATHY: ICD-10-CM

## 2024-03-10 DIAGNOSIS — R53.1 WEAKNESS: ICD-10-CM

## 2024-03-10 PROCEDURE — 99306 1ST NF CARE HIGH MDM 50: CPT | Performed by: INTERNAL MEDICINE

## 2024-03-10 NOTE — LETTER
Patient: Catie Groves  : 3/10/1929    Encounter Date: 03/10/2024    PLACE OF SERVICE:  [facility]    This is new/initial history and physical.    Subjective  Patient ID: Catie Groves is a 89 y.o. female who presents for Follow-up.    Ms. Darleen Groves is a 89-year-old female with recent history of COVID-19 with subsequent palpitations and rapid heart beat.  She has multiple medical problems and unable to care for herself.  She requires supportive care.    Review of Systems   Constitutional:  Negative for chills and fever.   Cardiovascular:  Negative for chest pain.   All other systems reviewed and are negative.    Objective  /76   Pulse 78   Temp 36.8 °C (98.2 °F)   Resp 18     Physical Exam  Vitals reviewed.   Constitutional:       Comments: This is a well-developed and well-nourished female, lying in bed, appearing weak   HENT:      Right Ear: Tympanic membrane, ear canal and external ear normal.      Left Ear: Tympanic membrane, ear canal and external ear normal.   Eyes:      General: No scleral icterus.     Pupils: Pupils are equal, round, and reactive to light.   Neck:      Vascular: No carotid bruit.   Cardiovascular:      Heart sounds: Normal heart sounds, S1 normal and S2 normal. No murmur heard.     No friction rub.   Pulmonary:      Effort: Pulmonary effort is normal.      Breath sounds: Normal breath sounds and air entry.   Abdominal:      Palpations: There is no hepatomegaly, splenomegaly or mass.   Musculoskeletal:         General: No swelling or deformity. Normal range of motion.      Cervical back: Neck supple.      Right lower leg: No edema.      Left lower leg: No edema.   Lymphadenopathy:      Cervical: No cervical adenopathy.      Upper Body:      Right upper body: No axillary adenopathy.      Left upper body: No axillary adenopathy.      Lower Body: No right inguinal adenopathy. No left inguinal adenopathy.   Neurological:      Mental Status: She is oriented to person,  place, and time. She is lethargic.      Cranial Nerves: Cranial nerves 2-12 are intact. No cranial nerve deficit.      Sensory: No sensory deficit.      Motor: Motor function is intact. No weakness.      Gait: Gait is intact.      Deep Tendon Reflexes: Reflexes normal.   Psychiatric:         Mood and Affect: Mood normal. Mood is not anxious or depressed. Affect is not angry.         Behavior: Behavior is not agitated.         Thought Content: Thought content normal.         Judgment: Judgment normal.     LAB WORK:  Laboratory studies reviewed.    Assessment/Plan  Problem List Items Addressed This Visit             ICD-10-CM       Cardiac and Vasculature    Tachycardia R00.0       Coag and Thromboembolic    Pulmonary embolism (CMS/[facility]) I26.99       Endocrine/Metabolic    Type 2 diabetes mellitus without retinopathy (CMS/[facility]) E11.9       Gastrointestinal and Abdominal    Esophageal reflux K21.9       Infectious Diseases    COVID-19 - Primary U07.1     Other Visit Diagnoses         Codes    Acute myocardial infarction, unspecified MI type, unspecified artery (CMS/[facility])     I21.9    Weakness     R53.1    At risk for falls     Z91.81    Hypertension, unspecified type     I10    Neuropathy     G62.9        1. Recent COVID-19, stable  2. Recent tachycardia, continue to monitor.  3. Diabetes, on insulin.  4. History of AMI, on aspirin.  5. Weakness, on PT and OT.  6. Fall risk, fall precaution.  7. Pulmonary embolism, anticoagulated.  8. Hypertension, med controlled.  9. Reflux disease, on PPI.  10. Neuropathy, on gabapentin.    Scribe Attestation  By signing my name below, IJocelyn Scribe attest that this documentation has been prepared under the direction and in the presence of Melanie Peguero MD.        All medical record entries made by the scribe were personally dictated by me I have reviewed the chart and agree the record accurately reflects my personal performance of his history physical examination and  management      Electronically Signed By: Melanie Peguero MD   3/14/24 10:42 PM

## 2024-03-12 VITALS
HEART RATE: 78 BPM | SYSTOLIC BLOOD PRESSURE: 124 MMHG | RESPIRATION RATE: 18 BRPM | DIASTOLIC BLOOD PRESSURE: 76 MMHG | TEMPERATURE: 98.2 F

## 2024-03-12 ASSESSMENT — ENCOUNTER SYMPTOMS
FEVER: 0
CHILLS: 0

## 2024-03-12 NOTE — PROGRESS NOTES
PLACE OF SERVICE:  [facility]    This is new/initial history and physical.    Subjective   Patient ID: Catie Groves is a 89 y.o. female who presents for Follow-up.    Ms. Darleen Groves is a 89-year-old female with recent history of COVID-19 with subsequent palpitations and rapid heart beat.  She has multiple medical problems and unable to care for herself.  She requires supportive care.    Review of Systems   Constitutional:  Negative for chills and fever.   Cardiovascular:  Negative for chest pain.   All other systems reviewed and are negative.    Objective   /76   Pulse 78   Temp 36.8 °C (98.2 °F)   Resp 18     Physical Exam  Vitals reviewed.   Constitutional:       Comments: This is a well-developed and well-nourished female, lying in bed, appearing weak   HENT:      Right Ear: Tympanic membrane, ear canal and external ear normal.      Left Ear: Tympanic membrane, ear canal and external ear normal.   Eyes:      General: No scleral icterus.     Pupils: Pupils are equal, round, and reactive to light.   Neck:      Vascular: No carotid bruit.   Cardiovascular:      Heart sounds: Normal heart sounds, S1 normal and S2 normal. No murmur heard.     No friction rub.   Pulmonary:      Effort: Pulmonary effort is normal.      Breath sounds: Normal breath sounds and air entry.   Abdominal:      Palpations: There is no hepatomegaly, splenomegaly or mass.   Musculoskeletal:         General: No swelling or deformity. Normal range of motion.      Cervical back: Neck supple.      Right lower leg: No edema.      Left lower leg: No edema.   Lymphadenopathy:      Cervical: No cervical adenopathy.      Upper Body:      Right upper body: No axillary adenopathy.      Left upper body: No axillary adenopathy.      Lower Body: No right inguinal adenopathy. No left inguinal adenopathy.   Neurological:      Mental Status: She is oriented to person, place, and time. She is lethargic.      Cranial Nerves: Cranial nerves 2-12  are intact. No cranial nerve deficit.      Sensory: No sensory deficit.      Motor: Motor function is intact. No weakness.      Gait: Gait is intact.      Deep Tendon Reflexes: Reflexes normal.   Psychiatric:         Mood and Affect: Mood normal. Mood is not anxious or depressed. Affect is not angry.         Behavior: Behavior is not agitated.         Thought Content: Thought content normal.         Judgment: Judgment normal.     LAB WORK:  Laboratory studies reviewed.    Assessment/Plan   Problem List Items Addressed This Visit             ICD-10-CM       Cardiac and Vasculature    Tachycardia R00.0       Coag and Thromboembolic    Pulmonary embolism (CMS/[facility]) I26.99       Endocrine/Metabolic    Type 2 diabetes mellitus without retinopathy (CMS/[facility]) E11.9       Gastrointestinal and Abdominal    Esophageal reflux K21.9       Infectious Diseases    COVID-19 - Primary U07.1     Other Visit Diagnoses         Codes    Acute myocardial infarction, unspecified MI type, unspecified artery (CMS/[facility])     I21.9    Weakness     R53.1    At risk for falls     Z91.81    Hypertension, unspecified type     I10    Neuropathy     G62.9        1. Recent COVID-19, stable  2. Recent tachycardia, continue to monitor.  3. Diabetes, on insulin.  4. History of AMI, on aspirin.  5. Weakness, on PT and OT.  6. Fall risk, fall precaution.  7. Pulmonary embolism, anticoagulated.  8. Hypertension, med controlled.  9. Reflux disease, on PPI.  10. Neuropathy, on gabapentin.    Scribe Attestation  By signing my name below, Jocelyn COTO Scribe attest that this documentation has been prepared under the direction and in the presence of Melanie Peguero MD.        All medical record entries made by the scribe were personally dictated by me I have reviewed the chart and agree the record accurately reflects my personal performance of his history physical examination and management

## 2024-03-12 NOTE — PROGRESS NOTES
Subjective   Patient ID: Catie Groves is a 89 y.o. female who presents for phone visit (Covid positive).    Patient tested positive for COVID.  She is having bodyaches generalized malaise poor appetite feeling little nauseous     Past recap   patient presents with complaints of having headache sinus congestion chest congestion runny nose  Follow-up on hypertension needs medication refill    patient is here because she continues to have pain and funny sensation in the left lower quadrant   Patient is wants her kidneys checked she is worried about the kidneys.  She is also having pain in the right knee having discomfort and having discomfort when walking  She got nerve block from Dr. Quesada for lumbar radiculopathy      Past recap  patient is here for routine follow-up  Her abdominal pain is doing better  She went to the GI and had colonoscopy done which was unremarkable.  Her pain went away by itself   Needs medication refill  Follow-up on hypertension diabetes high cholesterol did blood work     patient is here still having abdominal bloating still having left lower quadrant discomfort and very worried that something is wrong   She is drinking enough water  If you are in the morning but during the day does not pee as much     patient is here because she is still having off-and-on abdominal pain and feels very distended not having good bowel movement in spite of taking lactulose     Patient is here with complaints of having abdominal pain.  Last Wednesday went to the ER urine grew Klebsiella pneumonia treated for UTI  2 weeks ago was very constipated stomach was hurting all over no still gets distended and not having good bowel movement  She is on gabapentin by Dr. quesada  Her back also hurts quite a bit      patient is here for follow-up  Follow-up on coronary artery disease hypertension high cholesterol anxiety  Did blood work  Needs medications  Patient got II nerve blocks back pain is doing little better but  she has really suffered from the back pain.  Steroid only helped temporarily      past recap  Blood pressure  Needs medication refill  Shingles pain is doing better  Follow-up on coronary artery disease hypertension high cholesterol  Anxiety is doing better sometimes she takes tramadol  Wants nausea medication for occasional use  Wants handicap sticker  Shingles vaccine she is due  Overall doing well        Patient is complaining of having right upper quadrant pain and soreness having loose stools cholestyramine is not agreeing  She is worried that her GI issues are flaring up  She has history of common bile duct stones and irritable bowel disease  She also has history of diverticulitis C. difficile colitis     patient went to see the GI for the abdominal discomfort she was having she had MRI done CAT scans and ultrasound done everything came back normal  She did blood work for cholesterol diabetes  Here for follow-up and needs refills on medications  Overall she is doing great  Wants prescription for Zofran for occasional use      patient fell and broke her nose has a lot of bruises. On Wednesday she was walking in the parking lot to get groceries and she fell on her face  Now she is having pain in the right elbow she did not get x-ray of the elbow done concerned if she has broken the bone  She has sutures on the chin     Patient here for follow-up on hospital stay  Was admitted for TIA symptoms started on Plavix  Her blood pressure was very high last evening she is concerned if Plavix is making her blood pressure goal  She called me early morning hours around 5 spoke to her and after that she felt comfortable and slept  blood pressure is better now   She is also concerned about findings on the mastoid in MRI  Still feels sinuses to be congested       Objective   There were no vitals taken for this visit.        Assessment/Plan   Problem List Items Addressed This Visit    None  Visit Diagnoses       COVID-19             past recap  Patient symptoms could be related to gastroparesis  Patient quit taking Reglan  We we will try Reglan again  Try sucralfate to help with the acid reflux  She needs Plavix for her coronary artery disease and I do not think symptoms are caused by Plavix  Increase fiber in the diet  Follow-up if not better     10/21/22     Blood work results reviewed  Cholesterol well controlled  Sugar well controlled  Blood pressure stable  Patient had follow-up with a cardiologist coronary artery disease stable  Advised patient to take Metamucil to help with the loose stool  Explained that not safe to give standing order for antibiotics  Refer to physical therapy for lower back pain and hip pain  Medications refilled  Follow-up in 6 months     4/21/2023  Blood pressure stable  Creatinine has gone up to 1.69 BUN 42  Encourage patient to drink more water  Cut down salt intake if not better will do ultrasound kidneys and recheck blood work in 3 months  A1c has gone up to 7.6 patient has been really well controlled on diabetes all along  Steroids must be doing it  We will recheck and see if needs medication adjustment  Patient wants to wait until next blood work  Cholesterol is okay  Follow-up blood work in 3 months     7/31/2023  ER records reviewed  BUN 23 creatinine 1.5 potassium 5.1  Last blood work here showed elevated creatinine and potassium was high but at that time patient says she was getting nerve blocks which made her kidneys work well  We will check UA C&S  Try lactulose for constipation  Medrol Dosepak for the back pain  Follow-up if not better     8/7/2023  Patient has distended abdomen  Stat CT of the abdomen pelvis done without contrast because of compromised kidney function  No acute pathology found  Advised patient to take lactulose twice a day or 3 times a day to see if it helps to improve and regularize her bowel movements  Follow-up if not better            8/24/2023  All blood work has been  negative  CAT scan has been negative  We will do the ultrasound of the abdomen to make sure there is no urinary retention causing the symptoms  Refer to GI if ultrasound is negative    10/20/2023  Blood pressure stable  Cholesterol well controlled  Diabetes under control  Ursodiol given for bile duct/  Coronary artery disease stable patient to care of cardiologist  Medications refilled  Follow-up in 6 months    2/13/2024  Will check ultrasound kidney and bladder  Will check x-ray of the left hip to see if that is causing the pain will check x-ray of the right knee  Medrol Dosepak to help with the knee pain    2/22/2024  Treat with Zithromax for 10 days  Loratadine 10 mg a day  Steam saline gargles rest fluids  Follow-up if not better    2/26/2024  Will treat with Paxlovid  Advised not to take statin for 10 days  Rest fluids  Follow-up if gets worse or go to the emergency room

## 2024-03-16 ENCOUNTER — NURSING HOME VISIT (OUTPATIENT)
Dept: POST ACUTE CARE | Facility: EXTERNAL LOCATION | Age: 89
End: 2024-03-16
Payer: MEDICARE

## 2024-03-16 DIAGNOSIS — I10 HYPERTENSION, UNSPECIFIED TYPE: ICD-10-CM

## 2024-03-16 DIAGNOSIS — G62.9 NEUROPATHY: ICD-10-CM

## 2024-03-16 DIAGNOSIS — E11.9 TYPE 2 DIABETES MELLITUS WITHOUT COMPLICATION, WITH LONG-TERM CURRENT USE OF INSULIN (MULTI): Primary | ICD-10-CM

## 2024-03-16 DIAGNOSIS — Z91.81 AT RISK FOR FALLS: ICD-10-CM

## 2024-03-16 DIAGNOSIS — E78.5 HYPERLIPIDEMIA, UNSPECIFIED HYPERLIPIDEMIA TYPE: ICD-10-CM

## 2024-03-16 DIAGNOSIS — I25.10 ASHD (ARTERIOSCLEROTIC HEART DISEASE): ICD-10-CM

## 2024-03-16 DIAGNOSIS — Z79.4 TYPE 2 DIABETES MELLITUS WITHOUT COMPLICATION, WITH LONG-TERM CURRENT USE OF INSULIN (MULTI): Primary | ICD-10-CM

## 2024-03-16 DIAGNOSIS — K21.9 GASTROESOPHAGEAL REFLUX DISEASE WITHOUT ESOPHAGITIS: ICD-10-CM

## 2024-03-16 DIAGNOSIS — R53.1 WEAKNESS: ICD-10-CM

## 2024-03-16 DIAGNOSIS — F32.A DEPRESSION, UNSPECIFIED DEPRESSION TYPE: ICD-10-CM

## 2024-03-16 DIAGNOSIS — M48.00 SPINAL STENOSIS, UNSPECIFIED SPINAL REGION: ICD-10-CM

## 2024-03-16 PROCEDURE — 99308 SBSQ NF CARE LOW MDM 20: CPT | Performed by: INTERNAL MEDICINE

## 2024-03-16 NOTE — LETTER
Patient: Catie Groves  : 3/10/1929    Encounter Date: 2024    PLACE OF SERVICE:  [facility]    This is a subsequent visit.    Subjective  Patient ID: Catie Groves is a 89 y.o. female who presents for Follow-up.    Ms. Catie Groves is a 89-year-old female with history of multiple medical problems including complications from diabetes.  She has difficult time ambulating requiring supportive care.    Review of Systems   Constitutional:  Negative for chills and fever.   Cardiovascular:  Negative for chest pain.   All other systems reviewed and are negative.    Objective  /82   Pulse 80   Temp 36.7 °C (98.1 °F)   Resp 18     Physical Exam  Vitals reviewed.   Constitutional:       General: She is not in acute distress.     Comments: This is a well-developed and well-nourished female, sitting in a chair.   HENT:      Right Ear: Tympanic membrane, ear canal and external ear normal.      Left Ear: Tympanic membrane, ear canal and external ear normal.   Eyes:      General: No scleral icterus.     Pupils: Pupils are equal, round, and reactive to light.   Neck:      Vascular: No carotid bruit.   Cardiovascular:      Heart sounds: Normal heart sounds, S1 normal and S2 normal. No murmur heard.     No friction rub.   Pulmonary:      Effort: Pulmonary effort is normal.      Breath sounds: Normal breath sounds and air entry.   Abdominal:      Palpations: There is no hepatomegaly, splenomegaly or mass.   Musculoskeletal:         General: No swelling or deformity. Normal range of motion.      Cervical back: Neck supple.      Right lower leg: No edema.      Left lower leg: No edema.   Lymphadenopathy:      Cervical: No cervical adenopathy.      Upper Body:      Right upper body: No axillary adenopathy.      Left upper body: No axillary adenopathy.      Lower Body: No right inguinal adenopathy. No left inguinal adenopathy.   Neurological:      Mental Status: She is oriented to person, place, and time.       Cranial Nerves: Cranial nerves 2-12 are intact. No cranial nerve deficit.      Sensory: No sensory deficit.      Motor: Motor function is intact. No weakness.      Gait: Gait is intact.      Deep Tendon Reflexes: Reflexes normal.   Psychiatric:         Mood and Affect: Mood normal. Mood is not anxious or depressed. Affect is not angry.         Behavior: Behavior is not agitated.         Thought Content: Thought content normal.         Judgment: Judgment normal.     LAB WORK: Laboratory studies were reviewed.    Assessment/Plan  Problem List Items Addressed This Visit             ICD-10-CM       Cardiac and Vasculature    Hyperlipidemia E78.5       Endocrine/Metabolic    Diabetes mellitus (CMS/[facility]) - Primary E11.9       Gastrointestinal and Abdominal    Esophageal reflux K21.9       Mental Health    Depression F32.A     Other Visit Diagnoses         Codes    Neuropathy     G62.9    Spinal stenosis, unspecified spinal region     M48.00    Weakness     R53.1    At risk for falls     Z91.81    ASHD (arteriosclerotic heart disease)     I25.10    Hypertension, unspecified type     I10        1. Diabetes, on insulin.  2. Neuropathy, on gabapentin.  3. Spinal stenosis, on pain control.  4. Depression, on medication.  5. Weakness, on PT/OT.  6. Fall risk, on fall precautions.  7. Hyperlipidemia, on statin.  8. ASHD, on aspirin.  9. Hypertension, med controlled.  10. Reflux disease, on PPI.    Scribe Attestation  By signing my name below, I, Shawn Chen attest that this documentation has been prepared under the direction and in the presence of Melanie Peguero MD.     All medical record entries made by the scribe were personally dictated by me I have reviewed the chart and agree the record accurately reflects my personal performance of his history physical examination and management      Electronically Signed By: Melanie Peguero MD   3/24/24  9:38 PM

## 2024-03-22 VITALS
SYSTOLIC BLOOD PRESSURE: 132 MMHG | DIASTOLIC BLOOD PRESSURE: 82 MMHG | TEMPERATURE: 98.1 F | HEART RATE: 80 BPM | RESPIRATION RATE: 18 BRPM

## 2024-03-22 ASSESSMENT — ENCOUNTER SYMPTOMS
FEVER: 0
CHILLS: 0

## 2024-03-22 NOTE — PROGRESS NOTES
PLACE OF SERVICE:  [facility]    This is a subsequent visit.    Subjective   Patient ID: Catie Groves is a 89 y.o. female who presents for Follow-up.    Ms. Catie Groves is a 89-year-old female with history of multiple medical problems including complications from diabetes.  She has difficult time ambulating requiring supportive care.    Review of Systems   Constitutional:  Negative for chills and fever.   Cardiovascular:  Negative for chest pain.   All other systems reviewed and are negative.    Objective   /82   Pulse 80   Temp 36.7 °C (98.1 °F)   Resp 18     Physical Exam  Vitals reviewed.   Constitutional:       General: She is not in acute distress.     Comments: This is a well-developed and well-nourished female, sitting in a chair.   HENT:      Right Ear: Tympanic membrane, ear canal and external ear normal.      Left Ear: Tympanic membrane, ear canal and external ear normal.   Eyes:      General: No scleral icterus.     Pupils: Pupils are equal, round, and reactive to light.   Neck:      Vascular: No carotid bruit.   Cardiovascular:      Heart sounds: Normal heart sounds, S1 normal and S2 normal. No murmur heard.     No friction rub.   Pulmonary:      Effort: Pulmonary effort is normal.      Breath sounds: Normal breath sounds and air entry.   Abdominal:      Palpations: There is no hepatomegaly, splenomegaly or mass.   Musculoskeletal:         General: No swelling or deformity. Normal range of motion.      Cervical back: Neck supple.      Right lower leg: No edema.      Left lower leg: No edema.   Lymphadenopathy:      Cervical: No cervical adenopathy.      Upper Body:      Right upper body: No axillary adenopathy.      Left upper body: No axillary adenopathy.      Lower Body: No right inguinal adenopathy. No left inguinal adenopathy.   Neurological:      Mental Status: She is oriented to person, place, and time.      Cranial Nerves: Cranial nerves 2-12 are intact. No cranial nerve  deficit.      Sensory: No sensory deficit.      Motor: Motor function is intact. No weakness.      Gait: Gait is intact.      Deep Tendon Reflexes: Reflexes normal.   Psychiatric:         Mood and Affect: Mood normal. Mood is not anxious or depressed. Affect is not angry.         Behavior: Behavior is not agitated.         Thought Content: Thought content normal.         Judgment: Judgment normal.     LAB WORK: Laboratory studies were reviewed.    Assessment/Plan   Problem List Items Addressed This Visit             ICD-10-CM       Cardiac and Vasculature    Hyperlipidemia E78.5       Endocrine/Metabolic    Diabetes mellitus (CMS/[facility]) - Primary E11.9       Gastrointestinal and Abdominal    Esophageal reflux K21.9       Mental Health    Depression F32.A     Other Visit Diagnoses         Codes    Neuropathy     G62.9    Spinal stenosis, unspecified spinal region     M48.00    Weakness     R53.1    At risk for falls     Z91.81    ASHD (arteriosclerotic heart disease)     I25.10    Hypertension, unspecified type     I10        1. Diabetes, on insulin.  2. Neuropathy, on gabapentin.  3. Spinal stenosis, on pain control.  4. Depression, on medication.  5. Weakness, on PT/OT.  6. Fall risk, on fall precautions.  7. Hyperlipidemia, on statin.  8. ASHD, on aspirin.  9. Hypertension, med controlled.  10. Reflux disease, on PPI.    Scribe Attestation  By signing my name below, I, Shawn Chen attest that this documentation has been prepared under the direction and in the presence of Melanie Peguero MD.     All medical record entries made by the scribe were personally dictated by me I have reviewed the chart and agree the record accurately reflects my personal performance of his history physical examination and management

## 2024-03-29 ENCOUNTER — OFFICE VISIT (OUTPATIENT)
Dept: CARDIOLOGY | Facility: CLINIC | Age: 89
End: 2024-03-29
Payer: MEDICARE

## 2024-03-29 ENCOUNTER — OFFICE VISIT (OUTPATIENT)
Dept: PRIMARY CARE | Facility: CLINIC | Age: 89
End: 2024-03-29
Payer: MEDICARE

## 2024-03-29 VITALS
BODY MASS INDEX: 21.66 KG/M2 | DIASTOLIC BLOOD PRESSURE: 56 MMHG | SYSTOLIC BLOOD PRESSURE: 106 MMHG | OXYGEN SATURATION: 95 % | HEIGHT: 64 IN | WEIGHT: 126.9 LBS | HEART RATE: 72 BPM

## 2024-03-29 VITALS
BODY MASS INDEX: 21.68 KG/M2 | SYSTOLIC BLOOD PRESSURE: 104 MMHG | DIASTOLIC BLOOD PRESSURE: 60 MMHG | WEIGHT: 127 LBS | HEIGHT: 64 IN

## 2024-03-29 DIAGNOSIS — F41.9 ANXIETY: ICD-10-CM

## 2024-03-29 DIAGNOSIS — D64.9 ANEMIA, UNSPECIFIED TYPE: ICD-10-CM

## 2024-03-29 DIAGNOSIS — E11.9 TYPE 2 DIABETES MELLITUS WITHOUT COMPLICATION, WITHOUT LONG-TERM CURRENT USE OF INSULIN (MULTI): ICD-10-CM

## 2024-03-29 DIAGNOSIS — R53.83 OTHER FATIGUE: Primary | ICD-10-CM

## 2024-03-29 DIAGNOSIS — I25.10 ARTERIOSCLEROTIC CARDIOVASCULAR DISEASE: Primary | ICD-10-CM

## 2024-03-29 DIAGNOSIS — K21.9 GASTROESOPHAGEAL REFLUX DISEASE WITHOUT ESOPHAGITIS: ICD-10-CM

## 2024-03-29 DIAGNOSIS — E11.9 CONTROLLED TYPE 2 DIABETES MELLITUS WITHOUT COMPLICATION, WITHOUT LONG-TERM CURRENT USE OF INSULIN (MULTI): ICD-10-CM

## 2024-03-29 DIAGNOSIS — I10 BENIGN ESSENTIAL HTN: ICD-10-CM

## 2024-03-29 DIAGNOSIS — K80.50 CHOLEDOCHOLITHIASIS: ICD-10-CM

## 2024-03-29 PROCEDURE — 1126F AMNT PAIN NOTED NONE PRSNT: CPT | Performed by: INTERNAL MEDICINE

## 2024-03-29 PROCEDURE — 1159F MED LIST DOCD IN RCRD: CPT | Performed by: INTERNAL MEDICINE

## 2024-03-29 PROCEDURE — 3078F DIAST BP <80 MM HG: CPT | Performed by: INTERNAL MEDICINE

## 2024-03-29 PROCEDURE — 99214 OFFICE O/P EST MOD 30 MIN: CPT | Performed by: INTERNAL MEDICINE

## 2024-03-29 PROCEDURE — 3074F SYST BP LT 130 MM HG: CPT | Performed by: INTERNAL MEDICINE

## 2024-03-29 PROCEDURE — 1111F DSCHRG MED/CURRENT MED MERGE: CPT | Performed by: INTERNAL MEDICINE

## 2024-03-29 RX ORDER — PANTOPRAZOLE SODIUM 40 MG/1
40 TABLET, DELAYED RELEASE ORAL
Qty: 90 TABLET | Refills: 0 | Status: SHIPPED | OUTPATIENT
Start: 2024-03-29

## 2024-03-29 RX ORDER — URSODIOL 300 MG/1
300 CAPSULE ORAL 2 TIMES DAILY
Qty: 180 CAPSULE | Refills: 0 | Status: SHIPPED | OUTPATIENT
Start: 2024-03-29

## 2024-03-29 RX ORDER — MIRTAZAPINE 15 MG/1
15 TABLET, FILM COATED ORAL NIGHTLY
Qty: 90 TABLET | Refills: 0 | Status: SHIPPED | OUTPATIENT
Start: 2024-03-29

## 2024-03-29 RX ORDER — GLIPIZIDE 5 MG/1
5 TABLET ORAL DAILY
Qty: 90 TABLET | Refills: 0 | Status: SHIPPED | OUTPATIENT
Start: 2024-03-29

## 2024-03-29 RX ORDER — BLOOD SUGAR DIAGNOSTIC
100 STRIP MISCELLANEOUS 2 TIMES DAILY
Qty: 200 STRIP | Refills: 1 | Status: SHIPPED | OUTPATIENT
Start: 2024-03-29

## 2024-03-29 RX ORDER — LANCETS
EACH MISCELLANEOUS
Qty: 100 EACH | Refills: 3 | Status: SHIPPED | OUTPATIENT
Start: 2024-03-29

## 2024-03-29 ASSESSMENT — COLUMBIA-SUICIDE SEVERITY RATING SCALE - C-SSRS
2. HAVE YOU ACTUALLY HAD ANY THOUGHTS OF KILLING YOURSELF?: NO
1. IN THE PAST MONTH, HAVE YOU WISHED YOU WERE DEAD OR WISHED YOU COULD GO TO SLEEP AND NOT WAKE UP?: NO
6. HAVE YOU EVER DONE ANYTHING, STARTED TO DO ANYTHING, OR PREPARED TO DO ANYTHING TO END YOUR LIFE?: NO

## 2024-03-29 ASSESSMENT — PATIENT HEALTH QUESTIONNAIRE - PHQ9
1. LITTLE INTEREST OR PLEASURE IN DOING THINGS: NOT AT ALL
SUM OF ALL RESPONSES TO PHQ9 QUESTIONS 1 AND 2: 0
2. FEELING DOWN, DEPRESSED OR HOPELESS: NOT AT ALL

## 2024-03-29 ASSESSMENT — PAIN SCALES - GENERAL: PAINLEVEL: 0-NO PAIN

## 2024-03-29 NOTE — PATIENT INSTRUCTIONS
Decrease your amlodipine from 10 mg to 5 mg   Labs done in 3/2024  and pending future labs ordered by your primary care physician   Follow up in 3 months

## 2024-03-29 NOTE — PROGRESS NOTES
Subjective   Patient ID: Catie Groves is a 89 y.o. female who presents for Follow-up (Hospital had covid).    Patient is here for hospital follow-up and rehab follow-up.  She was admitted for COVID and dehydration  She is still feeling tired and has no ambition  Home care is still active and she is getting therapy     Past recap   patient presents with complaints of having headache sinus congestion chest congestion runny nose  Follow-up on hypertension needs medication refill      patient is here because she continues to have pain and funny sensation in the left lower quadrant   Patient is wants her kidneys checked she is worried about the kidneys.  She is also having pain in the right knee having discomfort and having discomfort when walking  She got nerve block from Dr. Quesada for lumbar radiculopathy      Past recap  patient is here for routine follow-up  Her abdominal pain is doing better  She went to the GI and had colonoscopy done which was unremarkable.  Her pain went away by itself   Needs medication refill  Follow-up on hypertension diabetes high cholesterol did blood work     patient is here still having abdominal bloating still having left lower quadrant discomfort and very worried that something is wrong   She is drinking enough water  If you are in the morning but during the day does not pee as much     patient is here because she is still having off-and-on abdominal pain and feels very distended not having good bowel movement in spite of taking lactulose     Patient is here with complaints of having abdominal pain.  Last Wednesday went to the ER urine grew Klebsiella pneumonia treated for UTI  2 weeks ago was very constipated stomach was hurting all over no still gets distended and not having good bowel movement  She is on gabapentin by Dr. quesada  Her back also hurts quite a bit      patient is here for follow-up  Follow-up on coronary artery disease hypertension high cholesterol anxiety  Did  "blood work  Needs medications  Patient got II nerve blocks back pain is doing little better but she has really suffered from the back pain.  Steroid only helped temporarily      past recap  Blood pressure  Needs medication refill  Shingles pain is doing better  Follow-up on coronary artery disease hypertension high cholesterol  Anxiety is doing better sometimes she takes tramadol  Wants nausea medication for occasional use  Wants handicap sticker  Shingles vaccine she is due  Overall doing well        Patient is complaining of having right upper quadrant pain and soreness having loose stools cholestyramine is not agreeing  She is worried that her GI issues are flaring up  She has history of common bile duct stones and irritable bowel disease  She also has history of diverticulitis C. difficile colitis     patient went to see the GI for the abdominal discomfort she was having she had MRI done CAT scans and ultrasound done everything came back normal  She did blood work for cholesterol diabetes  Here for follow-up and needs refills on medications  Overall she is doing great  Wants prescription for Zofran for occasional use      patient fell and broke her nose has a lot of bruises. On Wednesday she was walking in the parking lot to get groceries and she fell on her face  Now she is having pain in the right elbow she did not get x-ray of the elbow done concerned if she has broken the bone  She has sutures on the chin     Patient here for follow-up on hospital stay  Was admitted for TIA symptoms started on Plavix  Her blood pressure was very high last evening she is concerned if Plavix is making her blood pressure goal  She called me early morning hours around 5 spoke to her and after that she felt comfortable and slept  blood pressure is better now   She is also concerned about findings on the mastoid in MRI  Still feels sinuses to be congested       Objective   /60   Ht 1.626 m (5' 4\")   Wt 57.6 kg (127 lb)   " BMI 21.80 kg/m²     Physical Exam  Vitals reviewed.   Constitutional:       Appearance: Normal appearance.   HENT:      Head: Normocephalic and atraumatic.      Right Ear: Tympanic membrane, ear canal and external ear normal.      Left Ear: Tympanic membrane, ear canal and external ear normal.      Mouth/Throat:      Pharynx: Oropharynx is clear.   Eyes:      Extraocular Movements: Extraocular movements intact.      Conjunctiva/sclera: Conjunctivae normal.      Pupils: Pupils are equal, round, and reactive to light.   Cardiovascular:      Rate and Rhythm: Normal rate and regular rhythm.      Pulses: Normal pulses.      Heart sounds: Normal heart sounds.   Pulmonary:      Effort: Pulmonary effort is normal.      Breath sounds: Normal breath sounds.   Abdominal:      General: Abdomen is flat. Bowel sounds are normal.      Palpations: Abdomen is soft.   Musculoskeletal:      Cervical back: Normal range of motion and neck supple.   Skin:     General: Skin is warm and dry.   Neurological:      General: No focal deficit present.      Mental Status: She is alert and oriented to person, place, and time.   Psychiatric:         Mood and Affect: Mood normal.         Assessment/Plan   Problem List Items Addressed This Visit          Cardiac and Vasculature    Benign essential HTN    Relevant Orders    CBC    Comprehensive Metabolic Panel    Vitamin B12    Iron and TIBC       Endocrine/Metabolic    Diabetes mellitus (CMS/HCC)    Relevant Medications    lancets (Microlet Lancet) misc    blood sugar diagnostic (Contour Test Strips) strip       Gastrointestinal and Abdominal    Choledocholithiasis    Relevant Medications    ursodiol (Actigall) 300 mg capsule    Esophageal reflux    Relevant Medications    pantoprazole (ProtoNix) 40 mg EC tablet       Mental Health    Anxiety    Relevant Medications    mirtazapine (Remeron) 15 mg tablet     Other Visit Diagnoses       Other fatigue    -  Primary    Anemia, unspecified type         Relevant Orders    CBC    Comprehensive Metabolic Panel    Vitamin B12    Iron and TIBC    Controlled type 2 diabetes mellitus without complication, without long-term current use of insulin (CMS/[facility])        Relevant Medications    glipiZIDE (Glucotrol) 5 mg tablet        past recap  Patient symptoms could be related to gastroparesis  Patient quit taking Reglan  We we will try Reglan again  Try sucralfate to help with the acid reflux  She needs Plavix for her coronary artery disease and I do not think symptoms are caused by Plavix  Increase fiber in the diet  Follow-up if not better     10/21/22     Blood work results reviewed  Cholesterol well controlled  Sugar well controlled  Blood pressure stable  Patient had follow-up with a cardiologist coronary artery disease stable  Advised patient to take Metamucil to help with the loose stool  Explained that not safe to give standing order for antibiotics  Refer to physical therapy for lower back pain and hip pain  Medications refilled  Follow-up in 6 months     4/21/2023  Blood pressure stable  Creatinine has gone up to 1.69 BUN 42  Encourage patient to drink more water  Cut down salt intake if not better will do ultrasound kidneys and recheck blood work in 3 months  A1c has gone up to 7.6 patient has been really well controlled on diabetes all along  Steroids must be doing it  We will recheck and see if needs medication adjustment  Patient wants to wait until next blood work  Cholesterol is okay  Follow-up blood work in 3 months     7/31/2023  ER records reviewed  BUN 23 creatinine 1.5 potassium 5.1  Last blood work here showed elevated creatinine and potassium was high but at that time patient says she was getting nerve blocks which made her kidneys work well  We will check UA C&S  Try lactulose for constipation  Medrol Dosepak for the back pain  Follow-up if not better     8/7/2023  Patient has distended abdomen  Stat CT of the abdomen pelvis done without contrast  because of compromised kidney function  No acute pathology found  Advised patient to take lactulose twice a day or 3 times a day to see if it helps to improve and regularize her bowel movements  Follow-up if not better            8/24/2023  All blood work has been negative  CAT scan has been negative  We will do the ultrasound of the abdomen to make sure there is no urinary retention causing the symptoms  Refer to GI if ultrasound is negative    10/20/2023  Blood pressure stable  Cholesterol well controlled  Diabetes under control  Ursodiol given for bile duct/  Coronary artery disease stable patient to care of cardiologist  Medications refilled  Follow-up in 6 months    2/13/2024  Will check ultrasound kidney and bladder  Will check x-ray of the left hip to see if that is causing the pain will check x-ray of the right knee  Medrol Dosepak to help with the knee pain    2/22/2024  Treat with Zithromax for 10 days  Loratadine 10 mg a day  Steam saline gargles rest fluids  Follow-up if not better    3/29/2024  Hospital records reviewed  Patient's blood sugars were running low  Also she was little anemic  Because of COVID probably she is still feeling tired needs to recover  Will check blood work again CBC CMP for B12 and iron  Advised to hold off diabetic medication if blood sugars are running low  Medications refilled  Home care orders done  Routine follow-up and follow-up as needed if not feeling better  Will call her with the results

## 2024-03-29 NOTE — PROGRESS NOTES
Primary Care Physician: Darya Peguero MD  Date of Visit: 03/29/2024 11:30 AM EDT  Location of visit: [facility] [place] [address]     Chief Complaint:   Chief Complaint   Patient presents with    Hospital Follow-up    Coronary Artery Disease    Hyperlipidemia    Hypertension    Palpitations     HPI / Summary:   Catie Groves is a 89 y.o. female presents for follow-up    ROS    Medical History:   She has a past medical history of AMI (acute myocardial infarction) (CMS/HCC), Anxiety, ASHD (arteriosclerotic heart disease), COVID-19, Depression, Diabetes mellitus (CMS/HCC), Gastroparesis, GERD (gastroesophageal reflux disease), Hyperlipidemia, Hypertension, Neuropathy, Other conditions influencing health status, Other conditions influencing health status, Other conditions influencing health status, Other conditions influencing health status, Palpitations, Peripheral vascular angioplasty status, Personal history of other diseases of the circulatory system, Personal history of other diseases of the circulatory system, Personal history of other diseases of the digestive system, Personal history of other diseases of the nervous system and sense organs, Personal history of other endocrine, nutritional and metabolic disease (01/27/2017), Personal history of other endocrine, nutritional and metabolic disease, Personal history of other specified conditions (06/17/2019), Pneumonia, Pulmonary embolism (CMS/HCC), PVD (peripheral vascular disease) (CMS/HCC), Sinusitis, Spinal stenosis, Tachycardia, Unspecified secondary cataract, and Urinary tract infection.  Surgical Hx:   She has a past surgical history that includes Cholecystectomy (04/01/2013); Other surgical history (04/01/2013); Other surgical history (04/01/2013); Coronary angioplasty (04/01/2013); Other surgical history (10/30/2018); Other surgical history (10/30/2018); Other surgical history (06/24/2020); MR angio head wo IV contrast (10/15/2014); MR angio neck wo IV contrast  (1/18/2020); and MR angio head wo IV contrast (1/18/2020).   Social Hx:   She reports that she has never smoked. She has never been exposed to tobacco smoke. She has never used smokeless tobacco. She reports that she does not drink alcohol and does not use drugs.  Family Hx:   Her family history includes Anxiety disorder in her mother; Diabetes in an other family member; Heart disease in her father.   Allergies:  Allergies   Allergen Reactions    Amoxicillin-Pot Clavulanate Hives    Levofloxacin Other     Outpatient Medications:  Current Outpatient Medications   Medication Instructions    acetaminophen (TYLENOL) 650 mg, oral, Every 4 hours PRN    amLODIPine (NORVASC) 10 mg, oral, Daily    aspirin 81 mg, oral, Daily    atorvastatin (LIPITOR) 40 mg, oral, Daily    azelastine (Astelin) 137 mcg (0.1 %) nasal spray 2 sprays, Each Nostril, 2 times daily    cholecalciferol (Vitamin D3) 50 mcg (2,000 unit) capsule 1 capsule, oral, Daily    clopidogrel (PLAVIX) 75 mg, oral, Daily    docusate sodium (COLACE) 100 mg, oral, 2 times daily    fluticasone (Flonase) 50 mcg/actuation nasal spray 2 sprays, Each Nostril, Daily    gabapentin (NEURONTIN) 300 mg, oral, 2 times daily    glipiZIDE (GLUCOTROL) 5 mg, oral, Daily    Lactobacillus acidophilus (PROBIOTIC ORAL) oral    lancets (Microlet Lancet) misc Use to test 2-3 daily.    loratadine (CLARITIN) 10 mg, oral, Daily    losartan (COZAAR) 100 mg, oral, Daily    melatonin 3 mg, oral, Nightly    metoclopramide (REGLAN) 5 mg, oral, 4 times daily    metoprolol succinate XL (TOPROL-XL) 100 mg, oral, 2 times daily    mirtazapine (REMERON) 15 mg, oral, Nightly    multivit with minerals/lutein (VISION PLUS LUTEIN ORAL) Take as directed     nitroglycerin (NITROSTAT) 0.4 mg, sublingual, Every 5 min PRN    ondansetron ODT (ZOFRAN-ODT) 4 mg, oral, Every 8 hours PRN    pantoprazole (PROTONIX) 40 mg, oral, Daily before breakfast    pantoprazole (PROTONIX) 40 mg, oral, Daily before breakfast, Do  "not crush, chew, or split.    polyethylene glycol (GLYCOLAX, MIRALAX) 17 g, oral, Daily    traMADol (ULTRAM) 50 mg, oral, Every 8 hours PRN    ursodiol (ACTIGALL) 300 mg, oral, 2 times daily     Physical Exam:  Vitals:    03/29/24 1106   BP: 104/63   BP Location: Left arm   Patient Position: Sitting   BP Cuff Size: Adult   Pulse: 54   SpO2: 95%   Weight: 57.6 kg (126 lb 14.4 oz)   Height: 1.626 m (5' 4\")     Wt Readings from Last 5 Encounters:   03/29/24 57.6 kg (126 lb 14.4 oz)   03/29/24 57.6 kg (127 lb)   03/08/24 60.5 kg (133 lb 6.1 oz)   02/22/24 61.2 kg (135 lb)   02/13/24 61.2 kg (135 lb)     Physical Exam  JVP not elevated. Carotid impulses are 2+ without overlying bruit.   Chest exhibits fair to good air movement with completely clear breath sounds.   The cardiac rhythm is regular with no premature beats.   Normal S1 and S2. No gallop, murmur or rub, or click.   Abdomen is soft and benign without focal tenderness.   With no lower leg edema. The pedal pulses are intact.     Last Labs:  No results displayed because visit has over 200 results.      Lab on 10/14/2023   Component Date Value    WBC 10/14/2023 8.4     nRBC 10/14/2023 0.0     RBC 10/14/2023 4.03     Hemoglobin 10/14/2023 12.7     Hematocrit 10/14/2023 40.7     MCV 10/14/2023 101 (H)     MCH 10/14/2023 31.5     MCHC 10/14/2023 31.2 (L)     RDW 10/14/2023 12.6     Platelets 10/14/2023 311     MPV 10/14/2023 10.0     Glucose 10/14/2023 103 (H)     Sodium 10/14/2023 141     Potassium 10/14/2023 4.5     Chloride 10/14/2023 106     Bicarbonate 10/14/2023 23     Anion Gap 10/14/2023 17     Urea Nitrogen 10/14/2023 26 (H)     Creatinine 10/14/2023 1.27 (H)     eGFR 10/14/2023 39 (L)     Calcium 10/14/2023 9.8     Albumin 10/14/2023 4.2     Alkaline Phosphatase 10/14/2023 125     Total Protein 10/14/2023 6.7     AST 10/14/2023 22     Bilirubin, Total 10/14/2023 0.4     ALT 10/14/2023 15     Cholesterol 10/14/2023 168     HDL-Cholesterol 10/14/2023 59.4     " Cholesterol/HDL Ratio 10/14/2023 2.8     LDL Calculated 10/14/2023 74     VLDL 10/14/2023 34     Triglycerides 10/14/2023 171 (H)     Non HDL Cholesterol 10/14/2023 109     Hemoglobin A1C 10/14/2023 6.4 (H)     Estimated Average Glucose 10/14/2023 137         Assessment/Plan     1 CAD with anteroseptal MI 01/19/1995.     2. Status post PCI to high-grade mid LAD stenosis 04/19/1995. Patient is without any anginal symptoms. She did have an IV pharmacological nuclear stress test on 04/19/2013. That was negative for evidence of ischemia. The patient did have a repeat cardiac catheter performed parenthetically in 12/ 2008, with results as noted. More recently the patient was admitted to [facility] on 6/6/2014 with recurrent diverticulitis along with some intermittent chest tightness/discomfort. As such she did have a pharmacological nuclear stress test performed on 6/9/2014 which was negative for evidence of ischemia. Patient was admitted to [facility] on 11/3/2014 for chest pain and hypertension. She has not had any recurrent chest pain. ECG done today shows NSR.     3. Status post a right groin exploration and repair of right femoral artery occlusion following cardiac cath 12/2008.     4. Hyperlipidemia. The patient will continue atorvastatin 40 mg daily. Recent lipid panel from 12/9/2020 includes cholesterol 158 LDL 77 HDL 52 triglyceride 143. The SMA panel was normal except creatinine 1.37. Glycohemoglobin was 6.1%.     5. Hypertension. The patient's blood pressure is slightly elevated today. The patient has a considerable degree of anxiety with respect to her blood pressure. Will continue amlodipine 10 mg daily, losartan 100 mg daily and metoprolol  mg daily unchanged for now.  Blood pressure is acceptable at present current dosages of the amlodipine and losartan with the metoprolol ER having been increased to 100 mg twice daily during recent admission in 3/2024 for COVID-19 at which time she  was somewhat tachycardic.  The systolic blood pressure is actually in the lower range of normal and will reduce the dose of amlodipine from 10 to 5 mg daily.     6. Type 2 diabetes. Diabetic control has been satisfactory. Her recent glycohemoglobin was 6.1% on 12/9/2020.  Patient remains on usual glipizide 5 mg daily.  She did receive insulin temporarily when hospitalized.     7. Laparoscopic cholecystectomy with multiple postoperative ERCPs 7/2006.     8. History of pancreas divisum.     9. Hyperplastic colonic polyps.     10. Lumbosacral spinal DJD with lumbar radiculopathy.     11. History of DJD.     12. History of Gamble's neuroma, left foot.     13. History of pulmonary embolism, left lower lobe.     14, former Coumadin anticoagulation.     15. Gastroparesis.     16. GERD.     17. History of splenic artery aneurysm. The patient had a repeat CT angiogram on 10/29/2013 which demonstrated that the splenic artery aneurysm was unchanged in size.     18. History of reactive depression.     19. History of syncope with head trauma and tiny intracranial subarachnoid hemorrhage. Please see previous office notes for review of the day of 5/31. The patient had gotten up from the couch to use the bathroom when she became woozy lightheaded and fell, striking her head. She was taken [facility]. Head CT showed a left parietal scalp hematoma, along with a small 2 x 7 mm focus of increased attenuation within the left frontal sulcus consistent with a small amount of subarachnoid hemorrhage or petechial hemorrhage of adjacent cortex with no mass effect. The patient was transferred to [facility] observed for 2 days. Repeat CT scanning was done during that admission. It was thought that she might have had orthostatic hypotension as the cause of her fall and as noted. Her previously prescribed imdur was stopped. Patient has not had any recurrent syncopal episodes.      20. Status post partial colectomy for diverticulitis  6/16/2014 with postoperative C. difficile colitis. This patient was admitted to [facility] on 6/6/2014 with acute diverticulitis. She ultimately underwent a partial colectomy on 6/16. She was hospitalized for a period of one week afterwards and then sent to [facility] for 2 weeks rehabilitation. She was then readmitted to [facility] because of C. difficile colitis and was hospitalized for another 2 weeks. She was sent to [place] for rehabilitation for one week.      21. Hernia repair 2/2015 with Dr. Rodriguez at [facility].      22. Carotid vascular disease.     23. Palpitations. Hospital stay 09/2018 at [facility]. Was on metoprolol succinate 200 mg, but has decreased it to 100 mg without return of symptoms.      24. TIA. Had hospital stay at [facility] 01/17/2020 for suspected TIA. Had vision changes and buzzing in the head which have almost completely resolved. CT of brain was negative. MRI of brain was negative, but showed mastoiditis. She will schedule to see ENT. Will continue plavix. Is off asa.      25. Hx of covid-19 vaccine #1 and #2.  The patient was admitted to [facility] on 3/3/2024 - 3/8/2024 with COVID-19.  During that admission she was noted to have a sinus tachycardia.  Echocardiogram demonstrated an LV ejection fraction 60% 1-2+ mitral valve regurgitation 1+ tricuspid valve regurgitation aortic valve sclerosis.  Her metoprolol was increased to 100 mg twice daily.      Orders:  No orders of the defined types were placed in this encounter.     Followup Appts:  Future Appointments   Date Time Provider Department Center   4/16/2024  9:30 AM Kaden Elena OD [id number] [facility]   4/19/2024  9:45 AM David Hernandez MD [id number] [facility]   4/19/2024 11:30 AM Darya Peguero MD [id number] [facility]   7/5/2024 11:30 AM David Hernandez MD [id number] [facility]           ____________________________________________________________  [facility]  [facility]

## 2024-04-16 ENCOUNTER — OFFICE VISIT (OUTPATIENT)
Dept: OPHTHALMOLOGY | Facility: CLINIC | Age: 89
End: 2024-04-16
Payer: MEDICARE

## 2024-04-16 DIAGNOSIS — E11.9 TYPE 2 DIABETES MELLITUS WITHOUT RETINOPATHY (MULTI): ICD-10-CM

## 2024-04-16 DIAGNOSIS — H04.123 DRY EYE SYNDROME OF LACRIMAL GLAND, BILATERAL: ICD-10-CM

## 2024-04-16 DIAGNOSIS — H35.3131 EARLY DRY STAGE NONEXUDATIVE AGE-RELATED MACULAR DEGENERATION OF BOTH EYES: Primary | ICD-10-CM

## 2024-04-16 PROCEDURE — 92012 INTRM OPH EXAM EST PATIENT: CPT | Performed by: STUDENT IN AN ORGANIZED HEALTH CARE EDUCATION/TRAINING PROGRAM

## 2024-04-16 PROCEDURE — 92134 CPTRZ OPH DX IMG PST SGM RTA: CPT | Performed by: STUDENT IN AN ORGANIZED HEALTH CARE EDUCATION/TRAINING PROGRAM

## 2024-04-16 ASSESSMENT — EXTERNAL EXAM - LEFT EYE: OS_EXAM: DERMATOCHALASIS UL

## 2024-04-16 ASSESSMENT — VISUAL ACUITY
CORRECTION_TYPE: GLASSES
OD_CC+: -2
OD_CC: 20/50
METHOD: SNELLEN - LINEAR
OS_CC: 20/60

## 2024-04-16 ASSESSMENT — ENCOUNTER SYMPTOMS
MUSCULOSKELETAL NEGATIVE: 0
CARDIOVASCULAR NEGATIVE: 0
RESPIRATORY NEGATIVE: 0
HEMATOLOGIC/LYMPHATIC NEGATIVE: 0
EYES NEGATIVE: 0
ENDOCRINE NEGATIVE: 0
CONSTITUTIONAL NEGATIVE: 0
GASTROINTESTINAL NEGATIVE: 0
ALLERGIC/IMMUNOLOGIC NEGATIVE: 0
NEUROLOGICAL NEGATIVE: 0
PSYCHIATRIC NEGATIVE: 0

## 2024-04-16 ASSESSMENT — TONOMETRY
IOP_METHOD: GOLDMANN APPLANATION
OS_IOP_MMHG: 15
OD_IOP_MMHG: 15

## 2024-04-16 ASSESSMENT — REFRACTION_WEARINGRX
OD_AXIS: 145
OD_ADD: +3.00
OD_SPHERE: -2.75
OS_ADD: +3.00
OS_AXIS: 046
OS_CYLINDER: -4.50
OS_SPHERE: -1.50
OD_CYLINDER: -1.50

## 2024-04-16 ASSESSMENT — CONF VISUAL FIELD
OS_INFERIOR_TEMPORAL_RESTRICTION: 0
OD_INFERIOR_NASAL_RESTRICTION: 0
OS_SUPERIOR_NASAL_RESTRICTION: 0
OS_NORMAL: 1
METHOD: COUNTING FINGERS
OS_INFERIOR_NASAL_RESTRICTION: 0
OS_SUPERIOR_TEMPORAL_RESTRICTION: 0
OD_SUPERIOR_NASAL_RESTRICTION: 0
OD_NORMAL: 1
OD_SUPERIOR_TEMPORAL_RESTRICTION: 0
OD_INFERIOR_TEMPORAL_RESTRICTION: 0

## 2024-04-16 ASSESSMENT — SLIT LAMP EXAM - LIDS
COMMENTS: MGD UL/LL C SCALLOPED LID MARGIN
COMMENTS: MGD UL/LL C SCALLOPED LID MARGIN

## 2024-04-16 ASSESSMENT — EXTERNAL EXAM - RIGHT EYE: OD_EXAM: DERMATOCHALASIS UL

## 2024-04-16 ASSESSMENT — CUP TO DISC RATIO
OD_RATIO: .30
OS_RATIO: .30

## 2024-04-16 NOTE — PROGRESS NOTES
Assessment/Plan   Diagnoses and all orders for this visit:  Type 2 diabetes mellitus without retinopathy (CMS/HCC)  -No retinopathy observed on exam today od/os, pt ed to continue good BGlc, blood pressure and lipid control, rtc with any changes in vision, otherwise monitor 1 year  Early dry stage nonexudative age-related macular degeneration of both eyes  -Stable findings today. Ed on continued healthy diet/exercise/no smoking/sunglasses/ and monitoring visual acuity (VA) with amsler and RTC immediately for any vision changes.  -OCT MAC stable today  Amblyopia of left eye  Bilateral myopia  Regular astigmatism of both eyes  Pseudophakia  Dry Eye Syndrome Bilateral Lacrimal Glands  BCVA slightly reduced today OD 20/50. Hx of amblyopia OS-stable VA.    Reduction in BCVA due to ocular surface dryness; patient has not been using any treatment  Will start baseline treatments OTC Refresh or Systane TID, warm compresses 5 min/day, and Systane PM or Genteal kishan at bedtime.    RTC 6 months for annual with MRX, DFE, and Oct MAC

## 2024-04-19 ENCOUNTER — APPOINTMENT (OUTPATIENT)
Dept: PRIMARY CARE | Facility: CLINIC | Age: 89
End: 2024-04-19
Payer: MEDICARE

## 2024-04-19 ENCOUNTER — APPOINTMENT (OUTPATIENT)
Dept: CARDIOLOGY | Facility: CLINIC | Age: 89
End: 2024-04-19
Payer: MEDICARE

## 2024-07-05 ENCOUNTER — APPOINTMENT (OUTPATIENT)
Dept: CARDIOLOGY | Facility: CLINIC | Age: 89
End: 2024-07-05
Payer: MEDICARE

## 2024-07-13 ENCOUNTER — LAB (OUTPATIENT)
Dept: LAB | Facility: LAB | Age: 89
End: 2024-07-13
Payer: MEDICARE

## 2024-07-13 DIAGNOSIS — I10 BENIGN ESSENTIAL HTN: ICD-10-CM

## 2024-07-13 DIAGNOSIS — D64.9 ANEMIA, UNSPECIFIED TYPE: ICD-10-CM

## 2024-07-13 LAB
ALBUMIN SERPL-MCNC: 4.1 G/DL (ref 3.5–5)
ALP BLD-CCNC: 184 U/L (ref 35–125)
ALT SERPL-CCNC: 12 U/L (ref 5–40)
ANION GAP SERPL CALC-SCNC: 13 MMOL/L
AST SERPL-CCNC: 20 U/L (ref 5–40)
BILIRUB SERPL-MCNC: 0.3 MG/DL (ref 0.1–1.2)
BUN SERPL-MCNC: 34 MG/DL (ref 8–25)
CALCIUM SERPL-MCNC: 9.5 MG/DL (ref 8.5–10.4)
CHLORIDE SERPL-SCNC: 108 MMOL/L (ref 97–107)
CO2 SERPL-SCNC: 20 MMOL/L (ref 24–31)
CREAT SERPL-MCNC: 1.4 MG/DL (ref 0.4–1.6)
EGFRCR SERPLBLD CKD-EPI 2021: 35 ML/MIN/1.73M*2
ERYTHROCYTE [DISTWIDTH] IN BLOOD BY AUTOMATED COUNT: 12.1 % (ref 11.5–14.5)
GLUCOSE SERPL-MCNC: 117 MG/DL (ref 65–99)
HCT VFR BLD AUTO: 39.7 % (ref 36–46)
HGB BLD-MCNC: 12.5 G/DL (ref 12–16)
IRON SATN MFR SERPL: 34 % (ref 12–50)
IRON SERPL-MCNC: 93 UG/DL (ref 30–160)
MCH RBC QN AUTO: 30.3 PG (ref 26–34)
MCHC RBC AUTO-ENTMCNC: 31.5 G/DL (ref 32–36)
MCV RBC AUTO: 96 FL (ref 80–100)
NRBC BLD-RTO: 0 /100 WBCS (ref 0–0)
PLATELET # BLD AUTO: 265 X10*3/UL (ref 150–450)
POTASSIUM SERPL-SCNC: 4.7 MMOL/L (ref 3.4–5.1)
PROT SERPL-MCNC: 6.7 G/DL (ref 5.9–7.9)
RBC # BLD AUTO: 4.12 X10*6/UL (ref 4–5.2)
SODIUM SERPL-SCNC: 141 MMOL/L (ref 133–145)
TIBC SERPL-MCNC: 276 UG/DL (ref 228–428)
UIBC SERPL-MCNC: 183 UG/DL (ref 110–370)
VIT B12 SERPL-MCNC: 457 PG/ML (ref 211–946)
WBC # BLD AUTO: 6.6 X10*3/UL (ref 4.4–11.3)

## 2024-07-13 PROCEDURE — 85027 COMPLETE CBC AUTOMATED: CPT

## 2024-07-13 PROCEDURE — 80053 COMPREHEN METABOLIC PANEL: CPT

## 2024-07-13 PROCEDURE — 36415 COLL VENOUS BLD VENIPUNCTURE: CPT

## 2024-07-13 PROCEDURE — 83540 ASSAY OF IRON: CPT

## 2024-07-13 PROCEDURE — 83550 IRON BINDING TEST: CPT

## 2024-07-13 PROCEDURE — 82607 VITAMIN B-12: CPT

## 2024-07-18 PROBLEM — Z98.62 STATUS POST ANGIOPLASTY: Status: ACTIVE | Noted: 2024-07-18

## 2024-07-18 PROBLEM — Z90.49 ACQUIRED ABSENCE OF OTHER SPECIFIED PARTS OF DIGESTIVE TRACT: Status: ACTIVE | Noted: 2024-03-08

## 2024-07-18 PROBLEM — N39.0 ACUTE URINARY TRACT INFECTION: Status: ACTIVE | Noted: 2023-07-23

## 2024-07-18 PROBLEM — I83.90 VARICOSE VEINS OF LOWER EXTREMITY: Status: ACTIVE | Noted: 2023-04-21

## 2024-07-18 PROBLEM — Z86.69 HISTORY OF CATARACT: Status: ACTIVE | Noted: 2024-07-18

## 2024-07-18 PROBLEM — Z86.79 HISTORY OF PERIPHERAL VASCULAR DISEASE: Status: ACTIVE | Noted: 2024-07-18

## 2024-07-18 PROBLEM — I25.10 ATHEROSCLEROTIC HEART DISEASE OF NATIVE CORONARY ARTERY WITHOUT ANGINA PECTORIS: Status: ACTIVE | Noted: 2024-03-08

## 2024-07-18 PROBLEM — M54.41 LUMBAGO WITH SCIATICA, RIGHT SIDE: Status: ACTIVE | Noted: 2024-03-08

## 2024-07-18 PROBLEM — H53.9 ABNORMAL VISION: Status: ACTIVE | Noted: 2024-07-18

## 2024-07-18 PROBLEM — Z98.890 HISTORY OF YAG LASER IRIDOTOMY: Status: ACTIVE | Noted: 2023-04-21

## 2024-07-18 PROBLEM — Z86.79 HISTORY OF HYPERTENSION: Status: ACTIVE | Noted: 2024-07-18

## 2024-07-18 PROBLEM — H43.399 VITREOUS FLOATERS: Status: ACTIVE | Noted: 2023-04-21

## 2024-07-18 PROBLEM — M62.81 MUSCLE WEAKNESS (GENERALIZED): Status: ACTIVE | Noted: 2024-03-08

## 2024-07-18 PROBLEM — R73.09 BLOOD GLUCOSE ABNORMAL: Status: ACTIVE | Noted: 2024-07-18

## 2024-07-18 PROBLEM — H26.9 UNSPECIFIED CATARACT: Status: ACTIVE | Noted: 2024-03-08

## 2024-07-18 PROBLEM — R14.0 GASEOUS ABDOMINAL DISTENTION: Status: ACTIVE | Noted: 2024-07-18

## 2024-07-18 PROBLEM — H26.40 SECONDARY CATARACT: Status: ACTIVE | Noted: 2024-07-18

## 2024-07-18 PROBLEM — R26.81 UNSTEADINESS ON FEET: Status: ACTIVE | Noted: 2024-03-08

## 2024-07-18 PROBLEM — Z86.39 HISTORY OF TYPE 2 DIABETES MELLITUS: Status: ACTIVE | Noted: 2024-07-18

## 2024-07-18 PROBLEM — R26.89 OTHER ABNORMALITIES OF GAIT AND MOBILITY: Status: ACTIVE | Noted: 2024-03-08

## 2024-07-18 PROBLEM — I21.9 MYOCARDIAL INFARCTION (MULTI): Status: ACTIVE | Noted: 2024-07-18

## 2024-07-18 PROBLEM — I25.2 OLD MYOCARDIAL INFARCTION: Status: ACTIVE | Noted: 2024-03-08

## 2024-07-18 RX ORDER — METOPROLOL TARTRATE 100 MG/1
TABLET ORAL
COMMUNITY
Start: 2024-03-23 | End: 2024-07-19

## 2024-07-19 ENCOUNTER — APPOINTMENT (OUTPATIENT)
Dept: PRIMARY CARE | Facility: CLINIC | Age: 89
End: 2024-07-19
Payer: MEDICARE

## 2024-07-19 ENCOUNTER — OFFICE VISIT (OUTPATIENT)
Dept: CARDIOLOGY | Facility: CLINIC | Age: 89
End: 2024-07-19
Payer: MEDICARE

## 2024-07-19 VITALS
HEIGHT: 64 IN | WEIGHT: 128.1 LBS | BODY MASS INDEX: 21.87 KG/M2 | OXYGEN SATURATION: 95 % | HEART RATE: 69 BPM | DIASTOLIC BLOOD PRESSURE: 77 MMHG | SYSTOLIC BLOOD PRESSURE: 151 MMHG

## 2024-07-19 VITALS
DIASTOLIC BLOOD PRESSURE: 62 MMHG | SYSTOLIC BLOOD PRESSURE: 122 MMHG | BODY MASS INDEX: 21.85 KG/M2 | HEIGHT: 64 IN | WEIGHT: 128 LBS

## 2024-07-19 DIAGNOSIS — M25.562 ACUTE PAIN OF BOTH KNEES: ICD-10-CM

## 2024-07-19 DIAGNOSIS — E78.5 DYSLIPIDEMIA: ICD-10-CM

## 2024-07-19 DIAGNOSIS — M25.561 ACUTE PAIN OF BOTH KNEES: ICD-10-CM

## 2024-07-19 DIAGNOSIS — K80.50 CHOLEDOCHOLITHIASIS: ICD-10-CM

## 2024-07-19 DIAGNOSIS — I25.10 ARTERIOSCLEROTIC CARDIOVASCULAR DISEASE: Primary | ICD-10-CM

## 2024-07-19 DIAGNOSIS — I10 BENIGN ESSENTIAL HTN: ICD-10-CM

## 2024-07-19 DIAGNOSIS — U07.1 COVID-19: ICD-10-CM

## 2024-07-19 DIAGNOSIS — E11.9 CONTROLLED TYPE 2 DIABETES MELLITUS WITHOUT COMPLICATION, WITHOUT LONG-TERM CURRENT USE OF INSULIN (MULTI): ICD-10-CM

## 2024-07-19 PROCEDURE — 1160F RVW MEDS BY RX/DR IN RCRD: CPT | Performed by: NURSE PRACTITIONER

## 2024-07-19 PROCEDURE — 1159F MED LIST DOCD IN RCRD: CPT | Performed by: NURSE PRACTITIONER

## 2024-07-19 PROCEDURE — 99214 OFFICE O/P EST MOD 30 MIN: CPT | Performed by: NURSE PRACTITIONER

## 2024-07-19 PROCEDURE — 3077F SYST BP >= 140 MM HG: CPT | Performed by: NURSE PRACTITIONER

## 2024-07-19 PROCEDURE — 1036F TOBACCO NON-USER: CPT | Performed by: NURSE PRACTITIONER

## 2024-07-19 PROCEDURE — 3078F DIAST BP <80 MM HG: CPT | Performed by: NURSE PRACTITIONER

## 2024-07-19 PROCEDURE — 1159F MED LIST DOCD IN RCRD: CPT | Performed by: INTERNAL MEDICINE

## 2024-07-19 PROCEDURE — 3074F SYST BP LT 130 MM HG: CPT | Performed by: INTERNAL MEDICINE

## 2024-07-19 PROCEDURE — 99214 OFFICE O/P EST MOD 30 MIN: CPT | Performed by: INTERNAL MEDICINE

## 2024-07-19 PROCEDURE — 1126F AMNT PAIN NOTED NONE PRSNT: CPT | Performed by: NURSE PRACTITIONER

## 2024-07-19 PROCEDURE — 3078F DIAST BP <80 MM HG: CPT | Performed by: INTERNAL MEDICINE

## 2024-07-19 RX ORDER — AMLODIPINE BESYLATE 5 MG/1
5 TABLET ORAL DAILY
Qty: 90 TABLET | Refills: 3 | Status: SHIPPED | OUTPATIENT
Start: 2024-07-19 | End: 2025-07-19

## 2024-07-19 RX ORDER — DICLOFENAC SODIUM 10 MG/G
4 GEL TOPICAL 4 TIMES DAILY
Qty: 450 G | Refills: 0 | Status: SHIPPED | OUTPATIENT
Start: 2024-07-19

## 2024-07-19 RX ORDER — GABAPENTIN 300 MG/1
300 CAPSULE ORAL 2 TIMES DAILY
Qty: 180 CAPSULE | Refills: 0 | Status: SHIPPED | OUTPATIENT
Start: 2024-07-19

## 2024-07-19 RX ORDER — ATORVASTATIN CALCIUM 40 MG/1
40 TABLET, FILM COATED ORAL DAILY
Qty: 90 TABLET | Refills: 3 | Status: SHIPPED | OUTPATIENT
Start: 2024-07-19 | End: 2025-07-19

## 2024-07-19 RX ORDER — CLOPIDOGREL BISULFATE 75 MG/1
75 TABLET ORAL DAILY
Qty: 90 TABLET | Refills: 3 | Status: SHIPPED | OUTPATIENT
Start: 2024-07-19 | End: 2025-07-19

## 2024-07-19 RX ORDER — METOPROLOL SUCCINATE 100 MG/1
100 TABLET, EXTENDED RELEASE ORAL 2 TIMES DAILY
Qty: 180 TABLET | Refills: 3 | Status: SHIPPED | OUTPATIENT
Start: 2024-07-19 | End: 2025-07-19

## 2024-07-19 RX ORDER — GLIPIZIDE 5 MG/1
5 TABLET ORAL DAILY
Qty: 90 TABLET | Refills: 0 | Status: SHIPPED | OUTPATIENT
Start: 2024-07-19

## 2024-07-19 RX ORDER — URSODIOL 300 MG/1
300 CAPSULE ORAL 2 TIMES DAILY
Qty: 180 CAPSULE | Refills: 0 | Status: SHIPPED | OUTPATIENT
Start: 2024-07-19

## 2024-07-19 RX ORDER — LOSARTAN POTASSIUM 100 MG/1
100 TABLET ORAL DAILY
Qty: 90 TABLET | Refills: 3 | Status: SHIPPED | OUTPATIENT
Start: 2024-07-19 | End: 2025-07-19

## 2024-07-19 ASSESSMENT — PATIENT HEALTH QUESTIONNAIRE - PHQ9
SUM OF ALL RESPONSES TO PHQ9 QUESTIONS 1 AND 2: 0
1. LITTLE INTEREST OR PLEASURE IN DOING THINGS: NOT AT ALL
2. FEELING DOWN, DEPRESSED OR HOPELESS: NOT AT ALL

## 2024-07-19 ASSESSMENT — ENCOUNTER SYMPTOMS
MUSCULOSKELETAL NEGATIVE: 1
RESPIRATORY NEGATIVE: 1
CARDIOVASCULAR NEGATIVE: 1
NEUROLOGICAL NEGATIVE: 1
CONSTITUTIONAL NEGATIVE: 1
GASTROINTESTINAL NEGATIVE: 1

## 2024-07-19 ASSESSMENT — PAIN SCALES - GENERAL: PAINLEVEL: 0-NO PAIN

## 2024-07-19 NOTE — PROGRESS NOTES
Subjective   Patient ID: Catie Groves is a 89 y.o. female who presents for Follow-up.    Patient is here for follow-up  Follow-up on hypertension high cholesterol diabetes coronary artery disease  Needs refills  Last week right knee started hurting    Past recap   patient is here for hospital follow-up and rehab follow-up.  She was admitted for COVID and dehydration  She is still feeling tired and has no ambition  Home care is still active and she is getting therapy   patient is here because she continues to have pain and funny sensation in the left lower quadrant   Patient is wants her kidneys checked she is worried about the kidneys.  She is also having pain in the right knee having discomfort and having discomfort when walking  She got nerve block from Dr. Quesada for lumbar radiculopathy      Past recap  patient is here for routine follow-up  Her abdominal pain is doing better  She went to the GI and had colonoscopy done which was unremarkable.  Her pain went away by itself   Needs medication refill  Follow-up on hypertension diabetes high cholesterol did blood work     patient is here still having abdominal bloating still having left lower quadrant discomfort and very worried that something is wrong   She is drinking enough water  If you are in the morning but during the day does not pee as much     patient is here because she is still having off-and-on abdominal pain and feels very distended not having good bowel movement in spite of taking lactulose     Patient is here with complaints of having abdominal pain.  Last Wednesday went to the ER urine grew Klebsiella pneumonia treated for UTI  2 weeks ago was very constipated stomach was hurting all over no still gets distended and not having good bowel movement  She is on gabapentin by Dr. quesada  Her back also hurts quite a bit      patient is here for follow-up  Follow-up on coronary artery disease hypertension high cholesterol anxiety  Did blood  "work  Needs medications  Patient got II nerve blocks back pain is doing little better but she has really suffered from the back pain.  Steroid only helped temporarily      past recap  Blood pressure  Needs medication refill  Shingles pain is doing better  Follow-up on coronary artery disease hypertension high cholesterol  Anxiety is doing better sometimes she takes tramadol  Wants nausea medication for occasional use  Wants handicap sticker  Shingles vaccine she is due  Overall doing well        Patient is complaining of having right upper quadrant pain and soreness having loose stools cholestyramine is not agreeing  She is worried that her GI issues are flaring up  She has history of common bile duct stones and irritable bowel disease  She also has history of diverticulitis C. difficile colitis     patient went to see the GI for the abdominal discomfort she was having she had MRI done CAT scans and ultrasound done everything came back normal  She did blood work for cholesterol diabetes  Here for follow-up and needs refills on medications  Overall she is doing great  Wants prescription for Zofran for occasional use      patient fell and broke her nose has a lot of bruises. On Wednesday she was walking in the parking lot to get groceries and she fell on her face  Now she is having pain in the right elbow she did not get x-ray of the elbow done concerned if she has broken the bone  She has sutures on the chin     Patient here for follow-up on hospital stay  Was admitted for TIA symptoms started on Plavix  Her blood pressure was very high last evening she is concerned if Plavix is making her blood pressure goal  She called me early morning hours around 5 spoke to her and after that she felt comfortable and slept  blood pressure is better now   She is also concerned about findings on the mastoid in MRI  Still feels sinuses to be congested       Objective   /62   Ht 1.626 m (5' 4\")   Wt 58.1 kg (128 lb)   BMI " 21.97 kg/m²     Physical Exam  Vitals reviewed.   Constitutional:       Appearance: Normal appearance.   HENT:      Head: Normocephalic and atraumatic.      Right Ear: Tympanic membrane, ear canal and external ear normal.      Left Ear: Tympanic membrane, ear canal and external ear normal.      Mouth/Throat:      Pharynx: Oropharynx is clear.   Eyes:      Extraocular Movements: Extraocular movements intact.      Conjunctiva/sclera: Conjunctivae normal.      Pupils: Pupils are equal, round, and reactive to light.   Cardiovascular:      Rate and Rhythm: Normal rate and regular rhythm.      Pulses: Normal pulses.      Heart sounds: Normal heart sounds.   Pulmonary:      Effort: Pulmonary effort is normal.      Breath sounds: Normal breath sounds.   Abdominal:      General: Abdomen is flat. Bowel sounds are normal.      Palpations: Abdomen is soft.   Musculoskeletal:      Cervical back: Normal range of motion and neck supple.   Skin:     General: Skin is warm and dry.   Neurological:      General: No focal deficit present.      Mental Status: She is alert and oriented to person, place, and time.   Psychiatric:         Mood and Affect: Mood normal.         Assessment/Plan   Problem List Items Addressed This Visit          Cardiac and Vasculature    Benign essential HTN    Relevant Orders    CBC    Comprehensive Metabolic Panel    Hemoglobin A1C    Vitamin D 25-Hydroxy,Total (for eval of Vitamin D levels)    Lipid Panel    Thyroid Stimulating Hormone       Gastrointestinal and Abdominal    Choledocholithiasis    Relevant Medications    ursodiol (Actigall) 300 mg capsule    Other Relevant Orders    CBC    Comprehensive Metabolic Panel    Hemoglobin A1C    Vitamin D 25-Hydroxy,Total (for eval of Vitamin D levels)    Lipid Panel    Thyroid Stimulating Hormone       Musculoskeletal and Injuries    Knee pain    Relevant Medications    gabapentin (Neurontin) 300 mg capsule    lidocaine HCL 4 % adhesive patch,medicated     diclofenac sodium (Voltaren) 1 % gel     Other Visit Diagnoses       Controlled type 2 diabetes mellitus without complication, without long-term current use of insulin (Multi)        Relevant Medications    glipiZIDE (Glucotrol) 5 mg tablet    Other Relevant Orders    CBC    Comprehensive Metabolic Panel    Hemoglobin A1C    Vitamin D 25-Hydroxy,Total (for eval of Vitamin D levels)    Lipid Panel    Thyroid Stimulating Hormone    Dyslipidemia        Relevant Orders    CBC    Comprehensive Metabolic Panel    Hemoglobin A1C    Vitamin D 25-Hydroxy,Total (for eval of Vitamin D levels)    Lipid Panel    Thyroid Stimulating Hormone    Body mass index (BMI) 30.0-30.9, adult        Relevant Orders    Vitamin D 25-Hydroxy,Total (for eval of Vitamin D levels)          past recap  Patient symptoms could be related to gastroparesis  Patient quit taking Reglan  We we will try Reglan again  Try sucralfate to help with the acid reflux  She needs Plavix for her coronary artery disease and I do not think symptoms are caused by Plavix  Increase fiber in the diet  Follow-up if not better     10/21/22     Blood work results reviewed  Cholesterol well controlled  Sugar well controlled  Blood pressure stable  Patient had follow-up with a cardiologist coronary artery disease stable  Advised patient to take Metamucil to help with the loose stool  Explained that not safe to give standing order for antibiotics  Refer to physical therapy for lower back pain and hip pain  Medications refilled  Follow-up in 6 months     4/21/2023  Blood pressure stable  Creatinine has gone up to 1.69 BUN 42  Encourage patient to drink more water  Cut down salt intake if not better will do ultrasound kidneys and recheck blood work in 3 months  A1c has gone up to 7.6 patient has been really well controlled on diabetes all along  Steroids must be doing it  We will recheck and see if needs medication adjustment  Patient wants to wait until next blood  work  Cholesterol is okay  Follow-up blood work in 3 months     7/31/2023  ER records reviewed  BUN 23 creatinine 1.5 potassium 5.1  Last blood work here showed elevated creatinine and potassium was high but at that time patient says she was getting nerve blocks which made her kidneys work well  We will check UA C&S  Try lactulose for constipation  Medrol Dosepak for the back pain  Follow-up if not better     8/7/2023  Patient has distended abdomen  Stat CT of the abdomen pelvis done without contrast because of compromised kidney function  No acute pathology found  Advised patient to take lactulose twice a day or 3 times a day to see if it helps to improve and regularize her bowel movements  Follow-up if not better            8/24/2023  All blood work has been negative  CAT scan has been negative  We will do the ultrasound of the abdomen to make sure there is no urinary retention causing the symptoms  Refer to GI if ultrasound is negative    10/20/2023  Blood pressure stable  Cholesterol well controlled  Diabetes under control  Ursodiol given for bile duct/  Coronary artery disease stable patient to care of cardiologist  Medications refilled  Follow-up in 6 months      3/29/2024  Hospital records reviewed  Patient's blood sugars were running low  Also she was little anemic  Because of COVID probably she is still feeling tired needs to recover  Will check blood work again CBC CMP for B12 and iron  Advised to hold off diabetic medication if blood sugars are running low  Medications refilled  Home care orders done  Routine follow-up and follow-up as needed if not feeling better  Will call her with the results     7/19/2024  Patient does have arthritis in the knee  Lidoderm patch  Voltaren gel  Gabapentin to help with the pain  Refills given on ursodiol and glipizide  Blood work reviewed A1c and cholesterol is not done  Will do blood work in 3 months

## 2024-07-19 NOTE — PROGRESS NOTES
"Chief Complaint:   Follow-up    History Of Present Illness:    .Ms Groves returns in follow up.  Denies chest pain, sob, palpitations or pedal edema.           Last Recorded Vitals:  Blood pressure 151/77, pulse 69, height 1.626 m (5' 4\"), weight 58.1 kg (128 lb 1.6 oz), SpO2 95%.     Past Medical History:  Past Medical History:   Diagnosis Date    AMI (acute myocardial infarction) (Multi)     Anxiety     ASHD (arteriosclerotic heart disease)     COVID-19     Depression     Diabetes mellitus (Multi)     Gastroparesis     Gastroparesis    GERD (gastroesophageal reflux disease)     Hyperlipidemia     Hypertension     Neuropathy     Other conditions influencing health status     Acute Myocardial Infarction    Other conditions influencing health status     Pulmonary Embolism    Other conditions influencing health status     Total Occlusion Of The Femoral Artery    Other conditions influencing health status     Pulmonary Embolism    Palpitations     Peripheral vascular angioplasty status     Status post angioplasty    Personal history of other diseases of the circulatory system     History of hypertension    Personal history of other diseases of the circulatory system     History of peripheral vascular disease    Personal history of other diseases of the digestive system     History of esophageal reflux    Personal history of other diseases of the nervous system and sense organs     History of cataract    Personal history of other endocrine, nutritional and metabolic disease 01/27/2017    History of diabetic neuropathy    Personal history of other endocrine, nutritional and metabolic disease     History of type 2 diabetes mellitus    Personal history of other specified conditions 06/17/2019    History of urinary frequency    Pneumonia     Pulmonary embolism (Multi)     PVD (peripheral vascular disease) (CMS-[facility])     Sinusitis     Spinal stenosis     Tachycardia     Unspecified secondary cataract     Secondary cataract    " Urinary tract infection         Past Surgical History:  Past Surgical History:   Procedure Laterality Date    CHOLECYSTECTOMY  04/01/2013    Cholecystectomy    CORONARY ANGIOPLASTY  04/01/2013    PTCA    MR HEAD ANGIO WO IV CONTRAST  10/15/2014    MR HEAD ANGIO WO IV CONTRAST [facility]    MR HEAD ANGIO WO IV CONTRAST  1/18/2020    MR HEAD ANGIO WO IV CONTRAST [facility]    MR NECK ANGIO WO IV CONTRAST  1/18/2020    MR NECK ANGIO WO IV CONTRAST [facility]    OTHER SURGICAL HISTORY  04/01/2013    Femoral Artery Exploration    OTHER SURGICAL HISTORY  04/01/2013    Endoscopic Retrograde Cholangiopancreatography (ERCP)    OTHER SURGICAL HISTORY  10/30/2018    Cataract Extraction With Insertion Of Intraocular Lens    OTHER SURGICAL HISTORY  10/30/2018    Iridotomy By YAG Laser    OTHER SURGICAL HISTORY  06/24/2020    YAG laser capsulotomy       Social History:  Social History     Socioeconomic History    Marital status:    Tobacco Use    Smoking status: Never     Passive exposure: Never    Smokeless tobacco: Never   Substance and Sexual Activity    Alcohol use: Never    Drug use: Never     Social Determinants of Health     Financial Resource Strain: Low Risk  (3/3/2024)    Overall Financial Resource Strain (CARDIA)     Difficulty of Paying Living Expenses: Not hard at all   Food Insecurity: No Food Insecurity (3/3/2024)    Hunger Vital Sign     Worried About Running Out of Food in the Last Year: Never true     Ran Out of Food in the Last Year: Never true   Transportation Needs: No Transportation Needs (3/3/2024)    PRAPARE - Transportation     Lack of Transportation (Medical): No     Lack of Transportation (Non-Medical): No   Physical Activity: Inactive (3/3/2024)    Exercise Vital Sign     Days of Exercise per Week: 0 days     Minutes of Exercise per Session: 0 min   Stress: No Stress Concern Present (3/3/2024)    [employer] [place] [employer] - Occupational Stress  Questionnaire     Feeling of Stress : Not at all   Social Connections: Moderately Integrated (3/3/2024)    Social Connection and Isolation Panel [NHANES]     Frequency of Communication with Friends and Family: More than three times a week     Frequency of Social Gatherings with Friends and Family: More than three times a week     Attends Worship Services: More than 4 times per year     Active Member of Clubs or Organizations: Yes     Attends Club or Organization Meetings: More than 4 times per year     Marital Status:    Intimate Partner Violence: Not At Risk (3/3/2024)    Humiliation, Afraid, Rape, and Kick questionnaire     Fear of Current or Ex-Partner: No     Emotionally Abused: No     Physically Abused: No     Sexually Abused: No   Housing Stability: Low Risk  (3/3/2024)    Housing Stability Vital Sign     Unable to Pay for Housing in the Last Year: No     Number of Places Lived in the Last Year: 1     Unstable Housing in the Last Year: No       Family History:  Family History   Problem Relation Name Age of Onset    Anxiety disorder Mother      Heart disease Father      Diabetes Other           Allergies:  Amoxicillin-pot clavulanate and Levofloxacin    Outpatient Medications:  Current Outpatient Medications   Medication Sig Dispense Refill    acetaminophen (Tylenol) 325 mg tablet Take 2 tablets (650 mg) by mouth every 4 hours if needed for mild pain (1 - 3). 30 tablet 0    amLODIPine (Norvasc) 10 mg tablet Take 1 tablet (10 mg) by mouth once daily.      aspirin 81 mg chewable tablet Chew 1 tablet (81 mg) once daily.      atorvastatin (Lipitor) 40 mg tablet Take 1 tablet (40 mg) by mouth once daily.      azelastine (Astelin) 137 mcg (0.1 %) nasal spray Administer 2 sprays into each nostril 2 times a day.      blood sugar diagnostic (Contour Test Strips) strip 100 strips 2 times a day. 200 strip 1    cholecalciferol (Vitamin D3) 50 mcg (2,000 unit) capsule Take 1 capsule (50 mcg) by mouth once daily.       clopidogrel (Plavix) 75 mg tablet Take 1 tablet (75 mg) by mouth once daily.      docusate sodium (Colace) 100 mg capsule Take 1 capsule (100 mg) by mouth 2 times a day.      fluticasone (Flonase) 50 mcg/actuation nasal spray Administer 2 sprays into each nostril once daily.      gabapentin (Neurontin) 300 mg capsule Take 1 capsule (300 mg) by mouth twice a day.      glipiZIDE (Glucotrol) 5 mg tablet Take 1 tablet (5 mg) by mouth once daily. 90 tablet 0    Lactobacillus acidophilus (PROBIOTIC ORAL) Take by mouth.      lancets (Microlet Lancet) misc Use to test 2-3 daily. 100 each 3    losartan (Cozaar) 100 mg tablet Take 1 tablet (100 mg) by mouth once daily.      melatonin 3 mg tablet Take 1 tablet (3 mg) by mouth once daily at bedtime.      metoclopramide (Reglan) 5 mg tablet Take 1 tablet (5 mg) by mouth 4 times a day.      metoprolol succinate XL (Toprol-XL) 100 mg 24 hr tablet Take 1 tablet (100 mg) by mouth 2 times a day.      metoprolol tartrate (Lopressor) 100 mg tablet       mirtazapine (Remeron) 15 mg tablet Take 1 tablet (15 mg) by mouth once daily at bedtime. 90 tablet 0    multivit with minerals/lutein (VISION PLUS LUTEIN ORAL) Take as directed      nitroglycerin (Nitrostat) 0.4 mg SL tablet Place 1 tablet (0.4 mg) under the tongue every 5 minutes if needed for chest pain.      ondansetron ODT (Zofran-ODT) 4 mg disintegrating tablet Take 1 tablet (4 mg) by mouth every 8 hours if needed for nausea. 20 tablet 0    pantoprazole (ProtoNix) 40 mg EC tablet Take 1 tablet (40 mg) by mouth once daily in the morning. Take before meals. Do not crush, chew, or split. Do not start before March 9, 2024.      pantoprazole (ProtoNix) 40 mg EC tablet Take 1 tablet (40 mg) by mouth once daily in the morning. Take before meals. 90 tablet 0    polyethylene glycol (Glycolax, Miralax) 17 gram/dose powder Take 17 g by mouth once daily.      traMADol (Ultram) 50 mg tablet Take 1 tablet (50 mg) by mouth every 8 hours if  needed for moderate pain (4 - 6) or severe pain (7 - 10). 10 tablet 0    ursodiol (Actigall) 300 mg capsule Take 1 capsule (300 mg) by mouth 2 times a day. 180 capsule 0    loratadine (Claritin) 10 mg tablet Take 1 tablet (10 mg) by mouth once daily. 30 tablet 2     No current facility-administered medications for this visit.        Physical Exam:  Cardiovascular:      PMI at left midclavicular line. Normal rate. Regular rhythm. Normal S1. Normal S2.       Murmurs: There is no murmur.      No gallop.  No click. No rub.   Pulses:     Intact distal pulses.   Edema:     Peripheral edema absent.         ROS:  Review of Systems   Constitutional: Negative.   Cardiovascular: Negative.    Respiratory: Negative.     Skin: Negative.    Musculoskeletal: Negative.    Gastrointestinal: Negative.    Genitourinary: Negative.    Neurological: Negative.           Last Labs:  CBC -  Lab Results   Component Value Date    WBC 6.6 07/13/2024    HGB 12.5 07/13/2024    HCT 39.7 07/13/2024    MCV 96 07/13/2024     07/13/2024       CMP -  Lab Results   Component Value Date    CALCIUM 9.5 07/13/2024    PHOS 3.5 03/03/2024    PROT 6.7 07/13/2024    ALBUMIN 4.1 07/13/2024    AST 20 07/13/2024    ALT 12 07/13/2024    ALKPHOS 184 (H) 07/13/2024    BILITOT 0.3 07/13/2024       LIPID PANEL -   Lab Results   Component Value Date    CHOL 168 10/14/2023    TRIG 171 (H) 10/14/2023    HDL 59.4 10/14/2023    CHHDL 2.8 10/14/2023    LDLF 70 06/28/2023    VLDL 34 10/14/2023    NHDL 109 10/14/2023       RENAL FUNCTION PANEL -   Lab Results   Component Value Date    GLUCOSE 117 (H) 07/13/2024     07/13/2024    K 4.7 07/13/2024     (H) 07/13/2024    CO2 20 (L) 07/13/2024    ANIONGAP 13 07/13/2024    ANIONGAP 17 10/14/2023    BUN 34 (H) 07/13/2024    CREATININE 1.40 07/13/2024    CALCIUM 9.5 07/13/2024    PHOS 3.5 03/03/2024    ALBUMIN 4.1 07/13/2024        Lab Results   Component Value Date    HGBA1C 6.4 (H) 10/14/2023          Assessment/Plan   Problem List Items Addressed This Visit    None  1 CAD with anteroseptal MI 01/19/1995.     2. Status post PCI to high-grade mid LAD stenosis 04/19/1995. Patient is without any anginal symptoms. She did have an IV pharmacological nuclear stress test on 04/19/2013. That was negative for evidence of ischemia. The patient did have a repeat cardiac catheter performed parenthetically in 12/ 2008, with results as noted. More recently the patient was admitted to [facility] on 6/6/2014 with recurrent diverticulitis along with some intermittent chest tightness/discomfort. As such she did have a pharmacological nuclear stress test performed on 6/9/2014 which was negative for evidence of ischemia. Patient was admitted to [facility] on 11/3/2014 for chest pain and hypertension. She has not had any recurrent chest pain. ECG done today shows NSR.     3. Status post a right groin exploration and repair of right femoral artery occlusion following cardiac cath 12/2008.     4. Hyperlipidemia. The patient will continue atorvastatin 40 mg daily. Recent lipid panel from 12/9/2020 includes cholesterol 158 LDL 77 HDL 52 triglyceride 143. The SMA panel was normal except creatinine 1.37. Glycohemoglobin was 6.1%.     5. Hypertension. The patient's blood pressure is slightly elevated today. The patient has a considerable degree of anxiety with respect to her blood pressure. Will continue amlodipine 10 mg daily, losartan 100 mg daily and metoprolol  mg daily unchanged for now.  Blood pressure is acceptable at present current dosages of the amlodipine and losartan with the metoprolol ER having been increased to 100 mg twice daily during recent admission in 3/2024 for COVID-19 at which time she was somewhat tachycardic.  The systolic blood pressure is actually in the lower range of normal and will reduce the dose of amlodipine from 10 to 5 mg daily.     6. Type 2 diabetes. Diabetic control has  been satisfactory. Her recent glycohemoglobin was 6.1% on 12/9/2020.  Patient remains on usual glipizide 5 mg daily.  She did receive insulin temporarily when hospitalized.     7. Laparoscopic cholecystectomy with multiple postoperative ERCPs 7/2006.     8. History of pancreas divisum.     9. Hyperplastic colonic polyps.     10. Lumbosacral spinal DJD with lumbar radiculopathy.     11. History of DJD.     12. History of Gamble's neuroma, left foot.     13. History of pulmonary embolism, left lower lobe.     14, former Coumadin anticoagulation.     15. Gastroparesis.     16. GERD.     17. History of splenic artery aneurysm. The patient had a repeat CT angiogram on 10/29/2013 which demonstrated that the splenic artery aneurysm was unchanged in size.     18. History of reactive depression.     19. History of syncope with head trauma and tiny intracranial subarachnoid hemorrhage. Please see previous office notes for review of the day of 5/31. The patient had gotten up from the couch to use the bathroom when she became woozy lightheaded and fell, striking her head. She was taken [facility]. Head CT showed a left parietal scalp hematoma, along with a small 2 x 7 mm focus of increased attenuation within the left frontal sulcus consistent with a small amount of subarachnoid hemorrhage or petechial hemorrhage of adjacent cortex with no mass effect. The patient was transferred to [facility] observed for 2 days. Repeat CT scanning was done during that admission. It was thought that she might have had orthostatic hypotension as the cause of her fall and as noted. Her previously prescribed imdur was stopped. Patient has not had any recurrent syncopal episodes.      20. Status post partial colectomy for diverticulitis 6/16/2014 with postoperative C. difficile colitis. This patient was admitted to [facility] on 6/6/2014 with acute diverticulitis. She ultimately underwent a partial colectomy on 6/16. She was  hospitalized for a period of one week afterwards and then sent to [facility] for 2 weeks rehabilitation. She was then readmitted to [facility] because of C. difficile colitis and was hospitalized for another 2 weeks. She was sent to [place] for rehabilitation for one week.      21. Hernia repair 2/2015 with Dr. Rodriguez at [facility].      22. Carotid vascular disease.     23. Palpitations. Hospital stay 09/2018 at [facility]. Was on metoprolol succinate 200 mg, but has decreased it to 100 mg without return of symptoms.      24. TIA. Had hospital stay at [facility] 01/17/2020 for suspected TIA. Had vision changes and buzzing in the head which have almost completely resolved. CT of brain was negative. MRI of brain was negative, but showed mastoiditis. She will schedule to see ENT. Will continue plavix. Is off asa.      25. Hx of covid-19 vaccine #1 and #2.  The patient was admitted to [facility] on 3/3/2024 - 3/8/2024 with COVID-19.  During that admission she was noted to have a sinus tachycardia.  Echocardiogram demonstrated an LV ejection fraction 60% 1-2+ mitral valve regurgitation 1+ tricuspid valve regurgitation aortic valve sclerosis.  Her metoprolol was increased to 100 mg twice daily.           Kasandra Huizar, ADDIE-CNP

## 2024-08-14 ENCOUNTER — TELEPHONE (OUTPATIENT)
Dept: CARDIOLOGY | Facility: CLINIC | Age: 89
End: 2024-08-14
Payer: MEDICARE

## 2024-08-23 ENCOUNTER — OFFICE VISIT (OUTPATIENT)
Dept: PRIMARY CARE | Facility: CLINIC | Age: 89
End: 2024-08-23
Payer: MEDICARE

## 2024-08-23 ENCOUNTER — HOSPITAL ENCOUNTER (OUTPATIENT)
Dept: RADIOLOGY | Facility: CLINIC | Age: 89
Discharge: HOME | End: 2024-08-23
Payer: MEDICARE

## 2024-08-23 VITALS
DIASTOLIC BLOOD PRESSURE: 74 MMHG | WEIGHT: 129 LBS | BODY MASS INDEX: 22.02 KG/M2 | HEIGHT: 64 IN | SYSTOLIC BLOOD PRESSURE: 146 MMHG

## 2024-08-23 DIAGNOSIS — M54.42 ACUTE BILATERAL LOW BACK PAIN WITH BILATERAL SCIATICA: ICD-10-CM

## 2024-08-23 DIAGNOSIS — M54.41 ACUTE BILATERAL LOW BACK PAIN WITH BILATERAL SCIATICA: ICD-10-CM

## 2024-08-23 DIAGNOSIS — F41.9 ANXIETY: ICD-10-CM

## 2024-08-23 DIAGNOSIS — R06.02 SOB (SHORTNESS OF BREATH): ICD-10-CM

## 2024-08-23 DIAGNOSIS — R35.0 URINARY FREQUENCY: ICD-10-CM

## 2024-08-23 LAB
POC APPEARANCE, URINE: CLEAR
POC BILIRUBIN, URINE: NEGATIVE
POC BLOOD, URINE: NEGATIVE
POC COLOR, URINE: YELLOW
POC GLUCOSE, URINE: NEGATIVE MG/DL
POC KETONES, URINE: NEGATIVE MG/DL
POC LEUKOCYTES, URINE: NEGATIVE
POC NITRITE,URINE: NEGATIVE
POC PH, URINE: 6 PH
POC PROTEIN, URINE: NEGATIVE MG/DL
POC SPECIFIC GRAVITY, URINE: 1.02
POC UROBILINOGEN, URINE: 0.2 EU/DL

## 2024-08-23 PROCEDURE — 3078F DIAST BP <80 MM HG: CPT | Performed by: INTERNAL MEDICINE

## 2024-08-23 PROCEDURE — 3077F SYST BP >= 140 MM HG: CPT | Performed by: INTERNAL MEDICINE

## 2024-08-23 PROCEDURE — 72070 X-RAY EXAM THORAC SPINE 2VWS: CPT

## 2024-08-23 PROCEDURE — 99214 OFFICE O/P EST MOD 30 MIN: CPT | Performed by: INTERNAL MEDICINE

## 2024-08-23 PROCEDURE — 71046 X-RAY EXAM CHEST 2 VIEWS: CPT

## 2024-08-23 PROCEDURE — 81003 URINALYSIS AUTO W/O SCOPE: CPT | Performed by: INTERNAL MEDICINE

## 2024-08-23 PROCEDURE — 93000 ELECTROCARDIOGRAM COMPLETE: CPT | Performed by: INTERNAL MEDICINE

## 2024-08-23 RX ORDER — HYDROXYZINE PAMOATE 25 MG/1
25 CAPSULE ORAL 3 TIMES DAILY PRN
Qty: 30 CAPSULE | Refills: 0 | Status: SHIPPED | OUTPATIENT
Start: 2024-08-23 | End: 2024-09-02

## 2024-09-05 NOTE — PROGRESS NOTES
Subjective   Patient ID: Catie Groves is a 89 y.o. female who presents for Back Pain and sholulder pain.    Patient is here with complaints of having shoulder pain back pain generalized achiness not feeling good.  Not sure if she is having anxiety or having something wrong     Past recap   patient is here for follow-up  Follow-up on hypertension high cholesterol diabetes coronary artery disease  Needs refills  Last week right knee started hurting    Past recap   patient is here for hospital follow-up and rehab follow-up.  She was admitted for COVID and dehydration  She is still feeling tired and has no ambition  Home care is still active and she is getting therapy   patient is here because she continues to have pain and funny sensation in the left lower quadrant   Patient is wants her kidneys checked she is worried about the kidneys.  She is also having pain in the right knee having discomfort and having discomfort when walking  She got nerve block from Dr. Jacome for lumbar radiculopathy      Past recap  patient is here for routine follow-up  Her abdominal pain is doing better  She went to the GI and had colonoscopy done which was unremarkable.  Her pain went away by itself   Needs medication refill  Follow-up on hypertension diabetes high cholesterol did blood work     patient is here still having abdominal bloating still having left lower quadrant discomfort and very worried that something is wrong   She is drinking enough water  If you are in the morning but during the day does not pee as much     patient is here because she is still having off-and-on abdominal pain and feels very distended not having good bowel movement in spite of taking lactulose     Patient is here with complaints of having abdominal pain.  Last Wednesday went to the ER urine grew Klebsiella pneumonia treated for UTI  2 weeks ago was very constipated stomach was hurting all over no still gets distended and not having good bowel  movement  She is on gabapentin by Dr. quesada  Her back also hurts quite a bit      patient is here for follow-up  Follow-up on coronary artery disease hypertension high cholesterol anxiety  Did blood work  Needs medications  Patient got II nerve blocks back pain is doing little better but she has really suffered from the back pain.  Steroid only helped temporarily      Blood pressure  Needs medication refill  Shingles pain is doing better  Follow-up on coronary artery disease hypertension high cholesterol  Anxiety is doing better sometimes she takes tramadol  Wants nausea medication for occasional use  Wants handicap sticker  Shingles vaccine she is due  Overall doing well        Patient is complaining of having right upper quadrant pain and soreness having loose stools cholestyramine is not agreeing  She is worried that her GI issues are flaring up  She has history of common bile duct stones and irritable bowel disease  She also has history of diverticulitis C. difficile colitis     patient went to see the GI for the abdominal discomfort she was having she had MRI done CAT scans and ultrasound done everything came back normal  She did blood work for cholesterol diabetes  Here for follow-up and needs refills on medications  Overall she is doing great  Wants prescription for Zofran for occasional use      patient fell and broke her nose has a lot of bruises. On Wednesday she was walking in the parking lot to get groceries and she fell on her face  Now she is having pain in the right elbow she did not get x-ray of the elbow done concerned if she has broken the bone  She has sutures on the chin     Patient here for follow-up on hospital stay  Was admitted for TIA symptoms started on Plavix  Her blood pressure was very high last evening she is concerned if Plavix is making her blood pressure goal  She called me early morning hours around 5 spoke to her and after that she felt comfortable and slept  blood pressure is  "better now   She is also concerned about findings on the mastoid in MRI  Still feels sinuses to be congested       Objective   /74   Ht 1.626 m (5' 4\")   Wt 58.5 kg (129 lb)   BMI 22.14 kg/m²     Physical Exam  Vitals reviewed.   Constitutional:       Appearance: Normal appearance.   HENT:      Head: Normocephalic and atraumatic.      Right Ear: Tympanic membrane, ear canal and external ear normal.      Left Ear: Tympanic membrane, ear canal and external ear normal.      Mouth/Throat:      Pharynx: Oropharynx is clear.   Eyes:      Extraocular Movements: Extraocular movements intact.      Conjunctiva/sclera: Conjunctivae normal.      Pupils: Pupils are equal, round, and reactive to light.   Cardiovascular:      Rate and Rhythm: Normal rate and regular rhythm.      Pulses: Normal pulses.      Heart sounds: Normal heart sounds.   Pulmonary:      Effort: Pulmonary effort is normal.      Breath sounds: Normal breath sounds.   Abdominal:      General: Abdomen is flat. Bowel sounds are normal.      Palpations: Abdomen is soft.   Musculoskeletal:      Cervical back: Normal range of motion and neck supple.   Skin:     General: Skin is warm and dry.   Neurological:      General: No focal deficit present.      Mental Status: She is alert and oriented to person, place, and time.   Psychiatric:         Mood and Affect: Mood normal.         Assessment/Plan   Problem List Items Addressed This Visit          Mental Health    Anxiety       Musculoskeletal and Injuries    Back pain    Relevant Orders    XR thoracic spine 2 views (Completed)     Other Visit Diagnoses       SOB (shortness of breath)        Relevant Orders    XR chest 2 views (Completed)    ECG 12 lead (Clinic Performed)    Urinary frequency        Relevant Orders    POCT UA Automated manually resulted (Completed)          past recap  Patient symptoms could be related to gastroparesis  Patient quit taking Reglan  We we will try Reglan again  Try sucralfate to " help with the acid reflux  She needs Plavix for her coronary artery disease and I do not think symptoms are caused by Plavix  Increase fiber in the diet  Follow-up if not better     10/21/22     Blood work results reviewed  Cholesterol well controlled  Sugar well controlled  Blood pressure stable  Patient had follow-up with a cardiologist coronary artery disease stable  Advised patient to take Metamucil to help with the loose stool  Explained that not safe to give standing order for antibiotics  Refer to physical therapy for lower back pain and hip pain  Medications refilled  Follow-up in 6 months     4/21/2023  Blood pressure stable  Creatinine has gone up to 1.69 BUN 42  Encourage patient to drink more water  Cut down salt intake if not better will do ultrasound kidneys and recheck blood work in 3 months  A1c has gone up to 7.6 patient has been really well controlled on diabetes all along  Steroids must be doing it  We will recheck and see if needs medication adjustment  Patient wants to wait until next blood work  Cholesterol is okay  Follow-up blood work in 3 months     7/31/2023  ER records reviewed  BUN 23 creatinine 1.5 potassium 5.1  Last blood work here showed elevated creatinine and potassium was high but at that time patient says she was getting nerve blocks which made her kidneys work well  We will check UA C&S  Try lactulose for constipation  Medrol Dosepak for the back pain  Follow-up if not better     8/7/2023  Patient has distended abdomen  Stat CT of the abdomen pelvis done without contrast because of compromised kidney function  No acute pathology found  Advised patient to take lactulose twice a day or 3 times a day to see if it helps to improve and regularize her bowel movements  Follow-up if not better            8/24/2023  All blood work has been negative  CAT scan has been negative  We will do the ultrasound of the abdomen to make sure there is no urinary retention causing the symptoms  Refer  to GI if ultrasound is negative    10/20/2023  Blood pressure stable  Cholesterol well controlled  Diabetes under control  Ursodiol given for bile duct/  Coronary artery disease stable patient to care of cardiologist  Medications refilled  Follow-up in 6 months      3/29/2024  Hospital records reviewed  Patient's blood sugars were running low  Also she was little anemic  Because of COVID probably she is still feeling tired needs to recover  Will check blood work again CBC CMP for B12 and iron  Advised to hold off diabetic medication if blood sugars are running low  Medications refilled  Home care orders done  Routine follow-up and follow-up as needed if not feeling better  Will call her with the results     7/19/2024  Patient does have arthritis in the knee  Lidoderm patch  Voltaren gel  Gabapentin to help with the pain  Refills given on ursodiol and glipizide  Blood work reviewed A1c and cholesterol is not done  Will do blood work in 3 months    8/23/2024  EKG done shows normal sinus rhythm chest x-ray done which is unremarkable  UA done which is unremarkable  Patient could be having symptoms from anxiety  Advised to take anxiety medication  Patient has a follow-up with the cardiologist  Advised to go to the emergency room if symptoms gets worse

## 2024-09-10 ENCOUNTER — OFFICE VISIT (OUTPATIENT)
Dept: PRIMARY CARE | Facility: CLINIC | Age: 89
End: 2024-09-10
Payer: MEDICARE

## 2024-09-10 VITALS
BODY MASS INDEX: 22.02 KG/M2 | WEIGHT: 129 LBS | HEIGHT: 64 IN | SYSTOLIC BLOOD PRESSURE: 148 MMHG | DIASTOLIC BLOOD PRESSURE: 68 MMHG

## 2024-09-10 DIAGNOSIS — R30.9 URINARY PAIN: ICD-10-CM

## 2024-09-10 LAB
POC APPEARANCE, URINE: ABNORMAL
POC BILIRUBIN, URINE: NEGATIVE
POC BLOOD, URINE: ABNORMAL
POC COLOR, URINE: ABNORMAL
POC GLUCOSE, URINE: NEGATIVE MG/DL
POC KETONES, URINE: NEGATIVE MG/DL
POC LEUKOCYTES, URINE: ABNORMAL
POC NITRITE,URINE: NEGATIVE
POC PH, URINE: 6.5 PH
POC PROTEIN, URINE: NEGATIVE MG/DL
POC SPECIFIC GRAVITY, URINE: 1.01
POC UROBILINOGEN, URINE: 0.2 EU/DL

## 2024-09-10 PROCEDURE — 99213 OFFICE O/P EST LOW 20 MIN: CPT | Performed by: INTERNAL MEDICINE

## 2024-09-10 PROCEDURE — 81003 URINALYSIS AUTO W/O SCOPE: CPT | Performed by: INTERNAL MEDICINE

## 2024-09-10 PROCEDURE — 3077F SYST BP >= 140 MM HG: CPT | Performed by: INTERNAL MEDICINE

## 2024-09-10 PROCEDURE — 1159F MED LIST DOCD IN RCRD: CPT | Performed by: INTERNAL MEDICINE

## 2024-09-10 PROCEDURE — 3078F DIAST BP <80 MM HG: CPT | Performed by: INTERNAL MEDICINE

## 2024-09-10 RX ORDER — CEPHALEXIN 500 MG/1
500 CAPSULE ORAL 3 TIMES DAILY
Qty: 30 CAPSULE | Refills: 0 | Status: SHIPPED | OUTPATIENT
Start: 2024-09-10 | End: 2024-09-20

## 2024-09-10 NOTE — PROGRESS NOTES
Subjective   Patient ID: Catie Groves is a 89 y.o. female who presents for urinary burning.    Patient presents with complaints of having urinary burning and frequency and dark urine in the morning for last couple of days.  Denies any fever or chills  Denies any back pain, feeling tired    Patient is here with complaints of having shoulder pain back pain generalized achiness not feeling good.  Not sure if she is having anxiety or having something wrong     patient is here for follow-up  Follow-up on hypertension high cholesterol diabetes coronary artery disease  Needs refills  Last week right knee started hurting    Past recap   patient is here for hospital follow-up and rehab follow-up.  She was admitted for COVID and dehydration  She is still feeling tired and has no ambition  Home care is still active and she is getting therapy   patient is here because she continues to have pain and funny sensation in the left lower quadrant   Patient is wants her kidneys checked she is worried about the kidneys.  She is also having pain in the right knee having discomfort and having discomfort when walking  She got nerve block from Dr. Jacome for lumbar radiculopathy      Past recap  patient is here for routine follow-up  Her abdominal pain is doing better  She went to the GI and had colonoscopy done which was unremarkable.  Her pain went away by itself   Needs medication refill  Follow-up on hypertension diabetes high cholesterol did blood work     patient is here still having abdominal bloating still having left lower quadrant discomfort and very worried that something is wrong   She is drinking enough water  If you are in the morning but during the day does not pee as much     patient is here because she is still having off-and-on abdominal pain and feels very distended not having good bowel movement in spite of taking lactulose     Patient is here with complaints of having abdominal pain.  Last Wednesday went to the ER  urine grew Klebsiella pneumonia treated for UTI  2 weeks ago was very constipated stomach was hurting all over no still gets distended and not having good bowel movement  She is on gabapentin by Dr. quesada  Her back also hurts quite a bit      patient is here for follow-up  Follow-up on coronary artery disease hypertension high cholesterol anxiety  Did blood work  Needs medications  Patient got II nerve blocks back pain is doing little better but she has really suffered from the back pain.  Steroid only helped temporarily      Blood pressure  Needs medication refill  Shingles pain is doing better  Follow-up on coronary artery disease hypertension high cholesterol  Anxiety is doing better sometimes she takes tramadol  Wants nausea medication for occasional use  Wants handicap sticker  Shingles vaccine she is due  Overall doing well        Patient is complaining of having right upper quadrant pain and soreness having loose stools cholestyramine is not agreeing  She is worried that her GI issues are flaring up  She has history of common bile duct stones and irritable bowel disease  She also has history of diverticulitis C. difficile colitis     patient went to see the GI for the abdominal discomfort she was having she had MRI done CAT scans and ultrasound done everything came back normal  She did blood work for cholesterol diabetes  Here for follow-up and needs refills on medications  Overall she is doing great  Wants prescription for Zofran for occasional use      patient fell and broke her nose has a lot of bruises. On Wednesday she was walking in the parking lot to get groceries and she fell on her face  Now she is having pain in the right elbow she did not get x-ray of the elbow done concerned if she has broken the bone  She has sutures on the chin     Patient here for follow-up on hospital stay  Was admitted for TIA symptoms started on Plavix  Her blood pressure was very high last evening she is concerned if  "Plavix is making her blood pressure goal  She called me early morning hours around 5 spoke to her and after that she felt comfortable and slept  blood pressure is better now   She is also concerned about findings on the mastoid in MRI  Still feels sinuses to be congested       Objective   /68   Ht 1.626 m (5' 4\")   Wt 58.5 kg (129 lb)   BMI 22.14 kg/m²     Physical Exam  Vitals reviewed.   Constitutional:       Appearance: Normal appearance.   HENT:      Head: Normocephalic and atraumatic.      Right Ear: Tympanic membrane, ear canal and external ear normal.      Left Ear: Tympanic membrane, ear canal and external ear normal.      Mouth/Throat:      Pharynx: Oropharynx is clear.   Eyes:      Extraocular Movements: Extraocular movements intact.      Conjunctiva/sclera: Conjunctivae normal.      Pupils: Pupils are equal, round, and reactive to light.   Cardiovascular:      Rate and Rhythm: Normal rate and regular rhythm.      Pulses: Normal pulses.      Heart sounds: Normal heart sounds.   Pulmonary:      Effort: Pulmonary effort is normal.      Breath sounds: Normal breath sounds.   Abdominal:      General: Abdomen is flat. Bowel sounds are normal.      Palpations: Abdomen is soft.   Musculoskeletal:      Cervical back: Normal range of motion and neck supple.   Skin:     General: Skin is warm and dry.   Neurological:      General: No focal deficit present.      Mental Status: She is alert and oriented to person, place, and time.   Psychiatric:         Mood and Affect: Mood normal.       Assessment/Plan   Problem List Items Addressed This Visit    None  Visit Diagnoses       Urinary pain        Relevant Orders    POCT UA Automated manually resulted (Completed)          past recap  Patient symptoms could be related to gastroparesis  Patient quit taking Reglan  We we will try Reglan again  Try sucralfate to help with the acid reflux  She needs Plavix for her coronary artery disease and I do not think symptoms " are caused by Plavix  Increase fiber in the diet  Follow-up if not better     10/21/22   Blood work results reviewed  Cholesterol well controlled  Sugar well controlled  Blood pressure stable  Patient had follow-up with a cardiologist coronary artery disease stable  Advised patient to take Metamucil to help with the loose stool  Explained that not safe to give standing order for antibiotics  Refer to physical therapy for lower back pain and hip pain  Medications refilled  Follow-up in 6 months     4/21/2023  Blood pressure stable  Creatinine has gone up to 1.69 BUN 42  Encourage patient to drink more water  Cut down salt intake if not better will do ultrasound kidneys and recheck blood work in 3 months  A1c has gone up to 7.6 patient has been really well controlled on diabetes all along  Steroids must be doing it  We will recheck and see if needs medication adjustment  Patient wants to wait until next blood work  Cholesterol is okay  Follow-up blood work in 3 months     7/31/2023  ER records reviewed  BUN 23 creatinine 1.5 potassium 5.1  Last blood work here showed elevated creatinine and potassium was high but at that time patient says she was getting nerve blocks which made her kidneys work well  We will check UA C&S  Try lactulose for constipation  Medrol Dosepak for the back pain  Follow-up if not better     8/7/2023  Patient has distended abdomen  Stat CT of the abdomen pelvis done without contrast because of compromised kidney function  No acute pathology found  Advised patient to take lactulose twice a day or 3 times a day to see if it helps to improve and regularize her bowel movements  Follow-up if not better            8/24/2023  All blood work has been negative  CAT scan has been negative  We will do the ultrasound of the abdomen to make sure there is no urinary retention causing the symptoms  Refer to GI if ultrasound is negative    10/20/2023  Blood pressure stable  Cholesterol well controlled  Diabetes  under control  Ursodiol given for bile duct/  Coronary artery disease stable patient to care of cardiologist  Medications refilled  Follow-up in 6 months      3/29/2024  Hospital records reviewed  Patient's blood sugars were running low  Also she was little anemic  Because of COVID probably she is still feeling tired needs to recover  Will check blood work again CBC CMP for B12 and iron  Advised to hold off diabetic medication if blood sugars are running low  Medications refilled  Home care orders done  Routine follow-up and follow-up as needed if not feeling better  Will call her with the results     7/19/2024  Patient does have arthritis in the knee  Lidoderm patch  Voltaren gel  Gabapentin to help with the pain  Refills given on ursodiol and glipizide  Blood work reviewed A1c and cholesterol is not done  Will do blood work in 3 months    8/23/2024  EKG done shows normal sinus rhythm chest x-ray done which is unremarkable  UA done which is unremarkable  Patient could be having symptoms from anxiety  Advised to take anxiety medication  Patient has a follow-up with the cardiologist  Advised to go to the emergency room if symptoms gets worse    9/10/2024  Urine analysis shows UTI  Will send it for culture  Keflex for 10 days  Increase water intake  Follow-up if not better

## 2024-10-14 ENCOUNTER — APPOINTMENT (OUTPATIENT)
Dept: RADIOLOGY | Facility: HOSPITAL | Age: 89
End: 2024-10-14
Payer: MEDICARE

## 2024-10-14 ENCOUNTER — HOSPITAL ENCOUNTER (OUTPATIENT)
Facility: HOSPITAL | Age: 89
Setting detail: OBSERVATION
Discharge: HOME | End: 2024-10-16
Attending: STUDENT IN AN ORGANIZED HEALTH CARE EDUCATION/TRAINING PROGRAM | Admitting: INTERNAL MEDICINE
Payer: MEDICARE

## 2024-10-14 ENCOUNTER — APPOINTMENT (OUTPATIENT)
Dept: CARDIOLOGY | Facility: HOSPITAL | Age: 89
End: 2024-10-14
Payer: MEDICARE

## 2024-10-14 DIAGNOSIS — R00.2 PALPITATIONS: ICD-10-CM

## 2024-10-14 DIAGNOSIS — R07.9 CHEST PAIN, UNSPECIFIED TYPE: Primary | ICD-10-CM

## 2024-10-14 DIAGNOSIS — R74.8 ELEVATED LIPASE: ICD-10-CM

## 2024-10-14 LAB
ALBUMIN SERPL BCP-MCNC: 3.7 G/DL (ref 3.4–5)
ALP SERPL-CCNC: 118 U/L (ref 33–136)
ALT SERPL W P-5'-P-CCNC: 10 U/L (ref 7–45)
ANION GAP SERPL CALCULATED.3IONS-SCNC: 9 MMOL/L (ref 10–20)
APPEARANCE UR: CLEAR
AST SERPL W P-5'-P-CCNC: 20 U/L (ref 9–39)
ATRIAL RATE: 67 BPM
BASOPHILS # BLD AUTO: 0.06 X10*3/UL (ref 0–0.1)
BASOPHILS NFR BLD AUTO: 0.7 %
BILIRUB SERPL-MCNC: 0.4 MG/DL (ref 0–1.2)
BILIRUB UR STRIP.AUTO-MCNC: NEGATIVE MG/DL
BNP SERPL-MCNC: 389 PG/ML (ref 0–99)
BUN SERPL-MCNC: 27 MG/DL (ref 6–23)
CALCIUM SERPL-MCNC: 9.5 MG/DL (ref 8.6–10.3)
CARDIAC TROPONIN I PNL SERPL HS: 8 NG/L (ref 0–13)
CARDIAC TROPONIN I PNL SERPL HS: 8 NG/L (ref 0–13)
CHLORIDE SERPL-SCNC: 109 MMOL/L (ref 98–107)
CO2 SERPL-SCNC: 24 MMOL/L (ref 21–32)
COLOR UR: COLORLESS
CREAT SERPL-MCNC: 0.95 MG/DL (ref 0.5–1.05)
EGFRCR SERPLBLD CKD-EPI 2021: 55 ML/MIN/1.73M*2
EOSINOPHIL # BLD AUTO: 0.11 X10*3/UL (ref 0–0.4)
EOSINOPHIL NFR BLD AUTO: 1.3 %
ERYTHROCYTE [DISTWIDTH] IN BLOOD BY AUTOMATED COUNT: 12.3 % (ref 11.5–14.5)
FLUAV RNA RESP QL NAA+PROBE: NOT DETECTED
FLUBV RNA RESP QL NAA+PROBE: NOT DETECTED
GLUCOSE SERPL-MCNC: 134 MG/DL (ref 74–99)
GLUCOSE UR STRIP.AUTO-MCNC: NORMAL MG/DL
HCT VFR BLD AUTO: 37.6 % (ref 36–46)
HGB BLD-MCNC: 12.2 G/DL (ref 12–16)
HOLD SPECIMEN: NORMAL
HOLD SPECIMEN: NORMAL
IMM GRANULOCYTES # BLD AUTO: 0.03 X10*3/UL (ref 0–0.5)
IMM GRANULOCYTES NFR BLD AUTO: 0.3 % (ref 0–0.9)
KETONES UR STRIP.AUTO-MCNC: NEGATIVE MG/DL
LEUKOCYTE ESTERASE UR QL STRIP.AUTO: NEGATIVE
LIPASE SERPL-CCNC: 142 U/L (ref 9–82)
LYMPHOCYTES # BLD AUTO: 1.97 X10*3/UL (ref 0.8–3)
LYMPHOCYTES NFR BLD AUTO: 22.5 %
MCH RBC QN AUTO: 31.1 PG (ref 26–34)
MCHC RBC AUTO-ENTMCNC: 32.4 G/DL (ref 32–36)
MCV RBC AUTO: 96 FL (ref 80–100)
MONOCYTES # BLD AUTO: 0.6 X10*3/UL (ref 0.05–0.8)
MONOCYTES NFR BLD AUTO: 6.9 %
NEUTROPHILS # BLD AUTO: 5.98 X10*3/UL (ref 1.6–5.5)
NEUTROPHILS NFR BLD AUTO: 68.3 %
NITRITE UR QL STRIP.AUTO: NEGATIVE
NRBC BLD-RTO: 0 /100 WBCS (ref 0–0)
P AXIS: 50 DEGREES
P OFFSET: 192 MS
P ONSET: 140 MS
PH UR STRIP.AUTO: 5.5 [PH]
PLATELET # BLD AUTO: 220 X10*3/UL (ref 150–450)
POTASSIUM SERPL-SCNC: 4.3 MMOL/L (ref 3.5–5.3)
PR INTERVAL: 140 MS
PROT SERPL-MCNC: 6.6 G/DL (ref 6.4–8.2)
PROT UR STRIP.AUTO-MCNC: NEGATIVE MG/DL
Q ONSET: 210 MS
QRS COUNT: 11 BEATS
QRS DURATION: 86 MS
QT INTERVAL: 422 MS
QTC CALCULATION(BAZETT): 445 MS
QTC FREDERICIA: 438 MS
R AXIS: -3 DEGREES
RBC # BLD AUTO: 3.92 X10*6/UL (ref 4–5.2)
RBC # UR STRIP.AUTO: NEGATIVE /UL
SARS-COV-2 RNA RESP QL NAA+PROBE: NOT DETECTED
SODIUM SERPL-SCNC: 138 MMOL/L (ref 136–145)
SP GR UR STRIP.AUTO: 1.01
T AXIS: 47 DEGREES
T OFFSET: 421 MS
TSH SERPL-ACNC: 2.85 MIU/L (ref 0.44–3.98)
UROBILINOGEN UR STRIP.AUTO-MCNC: NORMAL MG/DL
VENTRICULAR RATE: 67 BPM
WBC # BLD AUTO: 8.8 X10*3/UL (ref 4.4–11.3)

## 2024-10-14 PROCEDURE — 71046 X-RAY EXAM CHEST 2 VIEWS: CPT

## 2024-10-14 PROCEDURE — 85025 COMPLETE CBC W/AUTO DIFF WBC: CPT | Performed by: CLINICAL NURSE SPECIALIST

## 2024-10-14 PROCEDURE — 83880 ASSAY OF NATRIURETIC PEPTIDE: CPT | Performed by: CLINICAL NURSE SPECIALIST

## 2024-10-14 PROCEDURE — 81003 URINALYSIS AUTO W/O SCOPE: CPT | Performed by: CLINICAL NURSE SPECIALIST

## 2024-10-14 PROCEDURE — G0378 HOSPITAL OBSERVATION PER HR: HCPCS

## 2024-10-14 PROCEDURE — 84443 ASSAY THYROID STIM HORMONE: CPT | Performed by: CLINICAL NURSE SPECIALIST

## 2024-10-14 PROCEDURE — 2500000002 HC RX 250 W HCPCS SELF ADMINISTERED DRUGS (ALT 637 FOR MEDICARE OP, ALT 636 FOR OP/ED): Performed by: INTERNAL MEDICINE

## 2024-10-14 PROCEDURE — 99285 EMERGENCY DEPT VISIT HI MDM: CPT

## 2024-10-14 PROCEDURE — 87636 SARSCOV2 & INF A&B AMP PRB: CPT | Performed by: CLINICAL NURSE SPECIALIST

## 2024-10-14 PROCEDURE — 36415 COLL VENOUS BLD VENIPUNCTURE: CPT | Performed by: CLINICAL NURSE SPECIALIST

## 2024-10-14 PROCEDURE — 71046 X-RAY EXAM CHEST 2 VIEWS: CPT | Performed by: RADIOLOGY

## 2024-10-14 PROCEDURE — 80053 COMPREHEN METABOLIC PANEL: CPT | Performed by: CLINICAL NURSE SPECIALIST

## 2024-10-14 PROCEDURE — 84484 ASSAY OF TROPONIN QUANT: CPT | Performed by: CLINICAL NURSE SPECIALIST

## 2024-10-14 PROCEDURE — 2500000001 HC RX 250 WO HCPCS SELF ADMINISTERED DRUGS (ALT 637 FOR MEDICARE OP): Performed by: INTERNAL MEDICINE

## 2024-10-14 PROCEDURE — 83690 ASSAY OF LIPASE: CPT | Performed by: CLINICAL NURSE SPECIALIST

## 2024-10-14 PROCEDURE — 2500000001 HC RX 250 WO HCPCS SELF ADMINISTERED DRUGS (ALT 637 FOR MEDICARE OP): Performed by: CLINICAL NURSE SPECIALIST

## 2024-10-14 PROCEDURE — 84075 ASSAY ALKALINE PHOSPHATASE: CPT | Performed by: CLINICAL NURSE SPECIALIST

## 2024-10-14 PROCEDURE — 99222 1ST HOSP IP/OBS MODERATE 55: CPT | Performed by: INTERNAL MEDICINE

## 2024-10-14 PROCEDURE — 93005 ELECTROCARDIOGRAM TRACING: CPT

## 2024-10-14 RX ORDER — DICLOFENAC SODIUM 10 MG/G
4 GEL TOPICAL 4 TIMES DAILY
Status: DISCONTINUED | OUTPATIENT
Start: 2024-10-14 | End: 2024-10-14

## 2024-10-14 RX ORDER — PANTOPRAZOLE SODIUM 40 MG/1
40 TABLET, DELAYED RELEASE ORAL
Status: DISCONTINUED | OUTPATIENT
Start: 2024-10-15 | End: 2024-10-16 | Stop reason: HOSPADM

## 2024-10-14 RX ORDER — MIRTAZAPINE 15 MG/1
15 TABLET, FILM COATED ORAL NIGHTLY
Status: DISCONTINUED | OUTPATIENT
Start: 2024-10-14 | End: 2024-10-16 | Stop reason: HOSPADM

## 2024-10-14 RX ORDER — GLIPIZIDE 5 MG/1
5 TABLET ORAL DAILY
Status: DISCONTINUED | OUTPATIENT
Start: 2024-10-14 | End: 2024-10-16 | Stop reason: HOSPADM

## 2024-10-14 RX ORDER — METOPROLOL SUCCINATE 100 MG/1
100 TABLET, EXTENDED RELEASE ORAL ONCE
Status: COMPLETED | OUTPATIENT
Start: 2024-10-14 | End: 2024-10-14

## 2024-10-14 RX ORDER — POLYETHYLENE GLYCOL 3350 17 G/17G
17 POWDER, FOR SOLUTION ORAL DAILY
Status: DISCONTINUED | OUTPATIENT
Start: 2024-10-14 | End: 2024-10-16 | Stop reason: HOSPADM

## 2024-10-14 RX ORDER — LORATADINE 10 MG/1
10 TABLET ORAL DAILY
COMMUNITY

## 2024-10-14 RX ORDER — CLOPIDOGREL BISULFATE 75 MG/1
75 TABLET ORAL DAILY
Status: DISCONTINUED | OUTPATIENT
Start: 2024-10-14 | End: 2024-10-16 | Stop reason: HOSPADM

## 2024-10-14 RX ORDER — GABAPENTIN 300 MG/1
300 CAPSULE ORAL 2 TIMES DAILY
Status: DISCONTINUED | OUTPATIENT
Start: 2024-10-14 | End: 2024-10-16 | Stop reason: HOSPADM

## 2024-10-14 RX ORDER — METOPROLOL SUCCINATE 100 MG/1
100 TABLET, EXTENDED RELEASE ORAL 2 TIMES DAILY
Status: DISCONTINUED | OUTPATIENT
Start: 2024-10-14 | End: 2024-10-16 | Stop reason: HOSPADM

## 2024-10-14 RX ORDER — ENOXAPARIN SODIUM 100 MG/ML
40 INJECTION SUBCUTANEOUS EVERY 24 HOURS
Status: DISCONTINUED | OUTPATIENT
Start: 2024-10-14 | End: 2024-10-16 | Stop reason: HOSPADM

## 2024-10-14 RX ORDER — TALC
3 POWDER (GRAM) TOPICAL NIGHTLY
Status: DISCONTINUED | OUTPATIENT
Start: 2024-10-14 | End: 2024-10-16 | Stop reason: HOSPADM

## 2024-10-14 RX ORDER — NAPROXEN SODIUM 220 MG/1
81 TABLET, FILM COATED ORAL DAILY
Status: DISCONTINUED | OUTPATIENT
Start: 2024-10-14 | End: 2024-10-16 | Stop reason: HOSPADM

## 2024-10-14 RX ORDER — ATORVASTATIN CALCIUM 40 MG/1
40 TABLET, FILM COATED ORAL DAILY
Status: DISCONTINUED | OUTPATIENT
Start: 2024-10-14 | End: 2024-10-16 | Stop reason: HOSPADM

## 2024-10-14 RX ORDER — PANTOPRAZOLE SODIUM 40 MG/1
40 TABLET, DELAYED RELEASE ORAL
Status: DISCONTINUED | OUTPATIENT
Start: 2024-10-15 | End: 2024-10-14 | Stop reason: SDUPTHER

## 2024-10-14 RX ORDER — AMLODIPINE BESYLATE 5 MG/1
5 TABLET ORAL DAILY
Status: DISCONTINUED | OUTPATIENT
Start: 2024-10-14 | End: 2024-10-15

## 2024-10-14 RX ORDER — ONDANSETRON 4 MG/1
4 TABLET, ORALLY DISINTEGRATING ORAL EVERY 8 HOURS PRN
Status: DISCONTINUED | OUTPATIENT
Start: 2024-10-14 | End: 2024-10-16 | Stop reason: HOSPADM

## 2024-10-14 RX ORDER — LIDOCAINE 50 MG/G
1 OINTMENT TOPICAL 2 TIMES DAILY PRN
Status: DISCONTINUED | OUTPATIENT
Start: 2024-10-14 | End: 2024-10-16 | Stop reason: HOSPADM

## 2024-10-14 RX ORDER — FLUTICASONE PROPIONATE 50 MCG
2 SPRAY, SUSPENSION (ML) NASAL DAILY
Status: DISCONTINUED | OUTPATIENT
Start: 2024-10-14 | End: 2024-10-16 | Stop reason: HOSPADM

## 2024-10-14 RX ORDER — LOSARTAN POTASSIUM 100 MG/1
100 TABLET ORAL DAILY
Status: DISCONTINUED | OUTPATIENT
Start: 2024-10-14 | End: 2024-10-16 | Stop reason: HOSPADM

## 2024-10-14 RX ORDER — URSODIOL 300 MG/1
300 CAPSULE ORAL 2 TIMES DAILY
Status: DISCONTINUED | OUTPATIENT
Start: 2024-10-14 | End: 2024-10-16 | Stop reason: HOSPADM

## 2024-10-14 SDOH — HEALTH STABILITY: PHYSICAL HEALTH: ON AVERAGE, HOW MANY DAYS PER WEEK DO YOU ENGAGE IN MODERATE TO STRENUOUS EXERCISE (LIKE A BRISK WALK)?: 0 DAYS

## 2024-10-14 SDOH — HEALTH STABILITY: MENTAL HEALTH: HOW OFTEN DO YOU HAVE A DRINK CONTAINING ALCOHOL?: NEVER

## 2024-10-14 SDOH — HEALTH STABILITY: PHYSICAL HEALTH
HOW OFTEN DO YOU NEED TO HAVE SOMEONE HELP YOU WHEN YOU READ INSTRUCTIONS, PAMPHLETS, OR OTHER WRITTEN MATERIAL FROM YOUR DOCTOR OR PHARMACY?: SOMETIMES

## 2024-10-14 SDOH — SOCIAL STABILITY: SOCIAL NETWORK: HOW OFTEN DO YOU GET TOGETHER WITH FRIENDS OR RELATIVES?: MORE THAN THREE TIMES A WEEK

## 2024-10-14 SDOH — SOCIAL STABILITY: SOCIAL INSECURITY: ARE YOU MARRIED, WIDOWED, DIVORCED, SEPARATED, NEVER MARRIED, OR LIVING WITH A PARTNER?: WIDOWED

## 2024-10-14 SDOH — HEALTH STABILITY: MENTAL HEALTH: HOW MANY DRINKS CONTAINING ALCOHOL DO YOU HAVE ON A TYPICAL DAY WHEN YOU ARE DRINKING?: PATIENT DOES NOT DRINK

## 2024-10-14 SDOH — ECONOMIC STABILITY: INCOME INSECURITY: IN THE PAST 12 MONTHS HAS THE ELECTRIC, GAS, OIL, OR WATER COMPANY THREATENED TO SHUT OFF SERVICES IN YOUR HOME?: NO

## 2024-10-14 SDOH — ECONOMIC STABILITY: FOOD INSECURITY: WITHIN THE PAST 12 MONTHS, THE FOOD YOU BOUGHT JUST DIDN'T LAST AND YOU DIDN'T HAVE MONEY TO GET MORE.: NEVER TRUE

## 2024-10-14 SDOH — ECONOMIC STABILITY: FOOD INSECURITY: WITHIN THE PAST 12 MONTHS, YOU WORRIED THAT YOUR FOOD WOULD RUN OUT BEFORE YOU GOT THE MONEY TO BUY MORE.: NEVER TRUE

## 2024-10-14 SDOH — ECONOMIC STABILITY: TRANSPORTATION INSECURITY: IN THE PAST 12 MONTHS, HAS LACK OF TRANSPORTATION KEPT YOU FROM MEDICAL APPOINTMENTS OR FROM GETTING MEDICATIONS?: NO

## 2024-10-14 SDOH — SOCIAL STABILITY: SOCIAL INSECURITY
WITHIN THE LAST YEAR, HAVE YOU BEEN RAPED OR FORCED TO HAVE ANY KIND OF SEXUAL ACTIVITY BY YOUR PARTNER OR EX-PARTNER?: NO

## 2024-10-14 SDOH — SOCIAL STABILITY: SOCIAL INSECURITY: WITHIN THE LAST YEAR, HAVE YOU BEEN AFRAID OF YOUR PARTNER OR EX-PARTNER?: NO

## 2024-10-14 SDOH — SOCIAL STABILITY: SOCIAL INSECURITY: WITHIN THE LAST YEAR, HAVE YOU BEEN HUMILIATED OR EMOTIONALLY ABUSED IN OTHER WAYS BY YOUR PARTNER OR EX-PARTNER?: NO

## 2024-10-14 SDOH — ECONOMIC STABILITY: HOUSING INSECURITY: IN THE LAST 12 MONTHS, WAS THERE A TIME WHEN YOU WERE NOT ABLE TO PAY THE MORTGAGE OR RENT ON TIME?: NO

## 2024-10-14 SDOH — HEALTH STABILITY: MENTAL HEALTH
DO YOU FEEL STRESS - TENSE, RESTLESS, NERVOUS, OR ANXIOUS, OR UNABLE TO SLEEP AT NIGHT BECAUSE YOUR MIND IS TROUBLED ALL THE TIME - THESE DAYS?: NOT AT ALL

## 2024-10-14 SDOH — ECONOMIC STABILITY: FOOD INSECURITY: HOW HARD IS IT FOR YOU TO PAY FOR THE VERY BASICS LIKE FOOD, HOUSING, MEDICAL CARE, AND HEATING?: NOT HARD AT ALL

## 2024-10-14 SDOH — ECONOMIC STABILITY: HOUSING INSECURITY: AT ANY TIME IN THE PAST 12 MONTHS, WERE YOU HOMELESS OR LIVING IN A SHELTER (INCLUDING NOW)?: NO

## 2024-10-14 SDOH — SOCIAL STABILITY: SOCIAL NETWORK
DO YOU BELONG TO ANY CLUBS OR ORGANIZATIONS SUCH AS CHURCH GROUPS, UNIONS, FRATERNAL OR ATHLETIC GROUPS, OR SCHOOL GROUPS?: YES

## 2024-10-14 SDOH — HEALTH STABILITY: MENTAL HEALTH: HOW OFTEN DO YOU HAVE SIX OR MORE DRINKS ON ONE OCCASION?: NEVER

## 2024-10-14 SDOH — SOCIAL STABILITY: SOCIAL NETWORK: HOW OFTEN DO YOU ATTEND MEETINGS OF THE CLUBS OR ORGANIZATIONS YOU BELONG TO?: MORE THAN 4 TIMES PER YEAR

## 2024-10-14 SDOH — ECONOMIC STABILITY: HOUSING INSECURITY: IN THE PAST 12 MONTHS, HOW MANY TIMES HAVE YOU MOVED WHERE YOU WERE LIVING?: 0

## 2024-10-14 SDOH — SOCIAL STABILITY: SOCIAL NETWORK: HOW OFTEN DO YOU ATTEND CHURCH OR RELIGIOUS SERVICES?: MORE THAN 4 TIMES PER YEAR

## 2024-10-14 SDOH — HEALTH STABILITY: PHYSICAL HEALTH: ON AVERAGE, HOW MANY MINUTES DO YOU ENGAGE IN EXERCISE AT THIS LEVEL?: 0 MIN

## 2024-10-14 ASSESSMENT — HEART SCORE
ECG: NON-SPECIFIC REPOLARIZATION DISTURBANCE
TROPONIN: LESS THAN OR EQUAL TO NORMAL LIMIT
AGE: 65+
HISTORY: MODERATELY SUSPICIOUS
HEART SCORE: 6
RISK FACTORS: >2 RISK FACTORS OR HX OF ATHEROSCLEROTIC DISEASE

## 2024-10-14 ASSESSMENT — PAIN - FUNCTIONAL ASSESSMENT: PAIN_FUNCTIONAL_ASSESSMENT: 0-10

## 2024-10-14 ASSESSMENT — LIFESTYLE VARIABLES
AUDIT-C TOTAL SCORE: 0
EVER HAD A DRINK FIRST THING IN THE MORNING TO STEADY YOUR NERVES TO GET RID OF A HANGOVER: NO
HAVE YOU EVER FELT YOU SHOULD CUT DOWN ON YOUR DRINKING: NO
EVER FELT BAD OR GUILTY ABOUT YOUR DRINKING: NO
TOTAL SCORE: 0
SKIP TO QUESTIONS 9-10: 1
HAVE PEOPLE ANNOYED YOU BY CRITICIZING YOUR DRINKING: NO

## 2024-10-14 ASSESSMENT — PAIN SCALES - GENERAL
PAINLEVEL_OUTOF10: 0 - NO PAIN
PAINLEVEL_OUTOF10: 0 - NO PAIN

## 2024-10-14 ASSESSMENT — ACTIVITIES OF DAILY LIVING (ADL): LACK_OF_TRANSPORTATION: NO

## 2024-10-14 NOTE — ED PROVIDER NOTES
Patient was seen by both myself and advanced practitioner.  I personally saw the patient and made/approved the management plan and take responsibility for the patient management     Patient is a 89-year-old female that presents emergency department for evaluation of hypertension, chest pain and palpitations.  Patient states symptoms started suddenly this morning waking her up out of sleep.  She woke up with a pressure in the left side of her chest that went to her left arm, left neck and she felt her heart racing.  She states the heart racing is some subsided however she still has a pressure present.  No medication were taken prior to arrival.  She states has never had this type of pain before.  She did note that this morning she also took her blood pressure at that time and found it to be significantly elevated.  She does note that she has had prior she denies nausea, vomiting, fevers or chills.    On exam patient anxious appearing, uncomfortable but in no obvious distress.  Lungs are clear auscultation bilaterally respirations easy, nonlabored.  Abdomen soft, nontender, nondistended.  She has no peripheral pitting edema.  She has 2+ radial pulses bilaterally.  Blood work unremarkable including normal white count of 8.8, normal electrolytes, normal kidney function.  Urinalysis shows no evidence urinary tract infection.  She does have normal troponin initially at 8 which remained flat on repeat testing at 8.  She also test negative for COVID-19, influenza.  Chest x-ray is clear showing no evidence of pneumonia, pneumothorax, wide mediastinum.  Patient does have a heart score of 6 and given the concerning story patient will be admitted for observation and cardiac evaluation as well as continued telemetry monitoring for dysrhythmias.  She did remain hemodynamically stable throughout stay in the emergency room.    I independently interpreted the following study(s) EKG, chest x-ray which show EKG shows normal sinus  rhythm with a left axis deviation and nonspecific T wave flattening but no evidence of STEMI or active ischemia.  Chest x-ray shows some mild left basilar atelectasis but no evidence of pneumonia, pneumothorax, pulmonary edema or rib fracture.       Sky Coley,   10/14/24 [id number]

## 2024-10-14 NOTE — PROGRESS NOTES
Catie Groves is a 89 y.o. female on day 0 of admission presenting with No Principal Problem: There is no principal problem currently on the Problem List. Please update the Problem List and refresh..       10/14/24 9136   ACS Disability Status   Are you deaf or do you have serious difficulty hearing? N   Are you blind or do you have serious difficulty seeing, even when wearing glasses? N   Because of a physical, mental, or emotional condition, do you have serious difficulty concentrating, remembering, or making decisions? (5 years old or older) N   Do you have serious difficulty walking or climbing stairs? N   Do you have serious difficulty dressing or bathing? N   Because of a physical, mental, or emotional condition, do you have serious difficulty doing errands alone such as visiting the doctor? N         Kellen Zapata RN

## 2024-10-14 NOTE — PROGRESS NOTES
Catie Groves is a 89 y.o. female on day 0 of admission presenting with No Principal Problem: There is no principal problem currently on the Problem List. Please update the Problem List and refresh..       10/14/24 1324   Discharge Planning   Living Arrangements Alone   Support Systems Children   Assistance Needed none   Type of Residence Private residence   Number of Stairs to Enter Residence 2   Number of Stairs Within Residence   (basement and upper level)   Do you have animals or pets at home? No   Who is requesting discharge planning? Provider   Home or Post Acute Services None   Expected Discharge Disposition Home   Does the patient need discharge transport arranged? No   Patient Choice   Provider Choice list and CMS website (https://medicare.gov/care-compare#search) for post-acute Quality and Resource Measure Data were provided and reviewed with: Other (Comment)  (no skilled needs)   Patient / Family choosing to utilize agency / facility established prior to hospitalization No         Kellen Zapata RN

## 2024-10-14 NOTE — PROGRESS NOTES
Pharmacy Medication History Review    Catie Groves is a 89 y.o. female. Pharmacy reviewed the patient's [id number] medications and allergies for accuracy.    Medications ADDED:  Biotin  Medications CHANGED:  Diclofenac gel - not taking  Lidocaine 4% patches-  as needed   Pantoprazole 40 - duplicate  Tramadol 50mg - not taking   Medications REMOVED:   Azelastine 137mcg  Docusate 100mg   Metoclopramide 5mg   Miralax      The list below reflects the updated PTA list. Comments regarding how patient may be taking medications differently can be found in the Admit Orders Activity  Prior to Admission Medications   Prescriptions Last Dose Informant   BIOTIN ORAL Past Week Self   Sig: Take 1 each by mouth once daily.   Lactobacillus acidophilus (PROBIOTIC ORAL) 10/13/2024 Self   Sig: Take 1 each by mouth once daily.   acetaminophen (Tylenol) 325 mg tablet 10/13/2024 Self   Sig: Take 2 tablets (650 mg) by mouth every 4 hours if needed for mild pain (1 - 3).   amLODIPine (Norvasc) 5 mg tablet 10/13/2024 Self   Sig: Take 1 tablet (5 mg) by mouth once daily.   aspirin 81 mg chewable tablet 10/13/2024 Self   Sig: Chew 1 tablet (81 mg) once daily.   atorvastatin (Lipitor) 40 mg tablet 10/13/2024 Self   Sig: Take 1 tablet (40 mg) by mouth once daily.   blood sugar diagnostic (Contour Test Strips) strip  Self   Si strips 2 times a day.   cholecalciferol (Vitamin D3) 50 mcg (2,000 unit) capsule  Self   Sig: Take 1 capsule (50 mcg) by mouth once daily.   clopidogrel (Plavix) 75 mg tablet Past Week Self   Sig: Take 1 tablet (75 mg) by mouth once daily.   diclofenac sodium (Voltaren) 1 % gel Not Taking Self   Sig: Apply 4.5 inches (4 g) topically 4 times a day.   Patient not taking: Reported on 10/14/2024   fluticasone (Flonase) 50 mcg/actuation nasal spray Past Week Self   Sig: Administer 2 sprays into each nostril once daily.   gabapentin (Neurontin) 300 mg capsule 10/13/2024 Self   Sig: Take 1 capsule (300 mg) by  mouth 2 times a day.   glipiZIDE (Glucotrol) 5 mg tablet 10/13/2024 Self   Sig: Take 1 tablet (5 mg) by mouth once daily.   lancets (Microlet Lancet) misc  Self   Sig: Use to test 2-3 daily.   lidocaine HCL 4 % adhesive patch,medicated Unknown Self   Sig: Apply 1 patch topically every 12 hours.   Patient taking differently: Apply 1 patch topically if needed.   loratadine (Claritin) 10 mg tablet     Sig: Take 1 tablet (10 mg) by mouth once daily.   loratadine (Claritin) 10 mg tablet Past Week Self   Sig: Take 1 tablet (10 mg) by mouth once daily.   losartan (Cozaar) 100 mg tablet 10/13/2024 Self   Sig: Take 1 tablet (100 mg) by mouth once daily.   melatonin 3 mg tablet Unknown Self   Sig: Take 1 tablet (3 mg) by mouth once daily at bedtime.   metoprolol succinate XL (Toprol-XL) 100 mg 24 hr tablet 10/13/2024 Self   Sig: Take 1 tablet (100 mg) by mouth 2 times a day.   mirtazapine (Remeron) 15 mg tablet 10/13/2024 Self   Sig: Take 1 tablet (15 mg) by mouth once daily at bedtime.   multivit with minerals/lutein (VISION PLUS LUTEIN ORAL) Past Month Self   Sig: Take 1 each by mouth once daily. Take as directed   nitroglycerin (Nitrostat) 0.4 mg SL tablet Unknown Self   Sig: Place 1 tablet (0.4 mg) under the tongue every 5 minutes if needed for chest pain.   ondansetron ODT (Zofran-ODT) 4 mg disintegrating tablet Unknown Self   Sig: Take 1 tablet (4 mg) by mouth every 8 hours if needed for nausea.   pantoprazole (ProtoNix) 40 mg EC tablet 10/13/2024 Self   Sig: Take 1 tablet (40 mg) by mouth once daily in the morning. Take before meals. Do not crush, chew, or split. Do not start before March 9, 2024.   pantoprazole (ProtoNix) 40 mg EC tablet Not Taking Self   Sig: Take 1 tablet (40 mg) by mouth once daily in the morning. Take before meals.   Patient not taking: Reported on 10/14/2024   traMADol (Ultram) 50 mg tablet Not Taking Self   Sig: Take 1 tablet (50 mg) by mouth every 8 hours if needed for moderate pain (4 - 6) or  severe pain (7 - 10).   Patient not taking: Reported on 10/14/2024   ursodiol (Actigall) 300 mg capsule 10/13/2024 Self   Sig: Take 1 capsule (300 mg) by mouth 2 times a day.      Facility-Administered Medications: None        The list below reflects the updated allergy list. Please review each documented allergy for additional clarification and justification.  Allergies  Reviewed by Maria C Fernando CPhT on 10/14/2024        Severity Reactions Comments    Amoxicillin-pot Clavulanate Not Specified Hives     Levofloxacin Not Specified Other             Pharmacy has been updated to [facility].    Sources used to complete the med history include dispense history, PTA medication list, patient interview. Patient is a fair historian.    Below are additional concerns with the patient's PTA list.  None     Maria C Fernando CPhT-Adv  Please reach out via [facility] Secure Chat for questions

## 2024-10-14 NOTE — PROGRESS NOTES
Catie Groves is a 89 y.o. female on day 0 of admission presenting with No Principal Problem: There is no principal problem currently on the Problem List. Please update the Problem List and refresh..       10/14/24 1322   Physical Activity   On average, how many days per week do you engage in moderate to strenuous exercise (like a brisk walk)? 0 days   On average, how many minutes do you engage in exercise at this level? 0 min   Financial Resource Strain   How hard is it for you to pay for the very basics like food, housing, medical care, and heating? Not hard   Housing Stability   In the last 12 months, was there a time when you were not able to pay the mortgage or rent on time? N   In the past 12 months, how many times have you moved where you were living? 0   At any time in the past 12 months, were you homeless or living in a shelter (including now)? N   Transportation Needs   In the past 12 months, has lack of transportation kept you from medical appointments or from getting medications? no   In the past 12 months, has lack of transportation kept you from meetings, work, or from getting things needed for daily living? No   Food Insecurity   Within the past 12 months, you worried that your food would run out before you got the money to buy more. Never true   Within the past 12 months, the food you bought just didn't last and you didn't have money to get more. Never true   Stress   Do you feel stress - tense, restless, nervous, or anxious, or unable to sleep at night because your mind is troubled all the time - these days? Not at all   Social Connections   In a typical week, how many times do you talk on the phone with family, friends, or neighbors? More than 3   How often do you get together with friends or relatives? More than 3   How often do you attend Congregation or Rastafarian services? More than 4   Do you belong to any clubs or organizations such as Congregation groups, unions, fraternal or athletic groups, or school  groups? Yes   How often do you attend meetings of the clubs or organizations you belong to? More than 4   Are you , , , , never , or living with a partner?    Intimate Partner Violence   Within the last year, have you been afraid of your partner or ex-partner? No   Within the last year, have you been humiliated or emotionally abused in other ways by your partner or ex-partner? No   Within the last year, have you been kicked, hit, slapped, or otherwise physically hurt by your partner or ex-partner? No   Within the last year, have you been raped or forced to have any kind of sexual activity by your partner or ex-partner? No   Alcohol Use   Q1: How often do you have a drink containing alcohol? Never   Q2: How many drinks containing alcohol do you have on a typical day when you are drinking? None   Q3: How often do you have six or more drinks on one occasion? Never   Utilities   In the past 12 months has the electric, gas, oil, or water company threatened to shut off services in your home? No   Health Literacy   How often do you need to have someone help you when you read instructions, pamphlets, or other written material from your doctor or pharmacy? Sometimes         Kellen Zapata RN

## 2024-10-14 NOTE — PROGRESS NOTES
Catie Groves is a 89 y.o. female on day 0 of admission presenting with No Principal Problem: There is no principal problem currently on the Problem List. Please update the Problem List and refresh..    Patient lives alone in a multi level house with first floor set up. She uses a cane PRN. Independent with ADLs, daily tasks, drives short distances. She has two sons who are local and involved. PCP is Dr JUAN Peguero. She also follows with Dr Hernandez/cardiology.   [facility] offered to set up [facility], patient declined.   Plan is to discharge home with no needs, family will transport.     Kellen Zapata RN

## 2024-10-14 NOTE — ED PROVIDER NOTES
Department of Emergency Medicine   ED  Provider Note  Admit Date/RoomTime: 10/14/2024  8:17 AM  ED Room: 05/05        History of Present Illness:  Chief Complaint   Patient presents with    Hypertension         Catie Groves is a 89 y.o. female history of anxiety, cardiovascular disease, diabetes, depression, hyperlipidemia, hypertension, history of subarachnoid hemorrhage, palpitations presents to the emergency department with complaints of pressure in her chest radiating up into her neck that woke her up in the middle of the night with heart racing.  She took her blood pressure at that time it was 190/110.  She took an aspirin.  Has had continued pressure feeling in her chest and neck.  She denies any associated symptoms of shortness of breath dizziness lightheadedness has had some nausea.  No cough congestion runny nose sore throat.  No abdominal pain no vomiting no pressure burning or frequency with urination.  She follows with Dr. Hernandez cardiology.  She did not take her blood pressure medication today.  Denies increased weight gain no calf pain no long distance travel    Review of Systems:   Pertinent positives and negatives are stated within HPI, all other systems reviewed and are negative.        --------------------------------------------- PAST HISTORY ---------------------------------------------  Past Medical History:  has a past medical history of AMI (acute myocardial infarction) (Multi), Anxiety, ASHD (arteriosclerotic heart disease), COVID-19, Depression, Diabetes mellitus (Multi), Gastroparesis, GERD (gastroesophageal reflux disease), Hyperlipidemia, Hypertension, Neuropathy, Other conditions influencing health status, Other conditions influencing health status, Other conditions influencing health status, Other conditions influencing health status, Palpitations, Peripheral vascular angioplasty status, Personal history of other diseases of the circulatory system, Personal history of other  diseases of the circulatory system, Personal history of other diseases of the digestive system, Personal history of other diseases of the nervous system and sense organs, Personal history of other endocrine, nutritional and metabolic disease (01/27/2017), Personal history of other endocrine, nutritional and metabolic disease, Personal history of other specified conditions (06/17/2019), Pneumonia, Pulmonary embolism (Multi), PVD (peripheral vascular disease) (CMS-HCC), Sinusitis, Spinal stenosis, Tachycardia, Unspecified secondary cataract, and Urinary tract infection.  Past Surgical History:  has a past surgical history that includes Cholecystectomy (04/01/2013); Other surgical history (04/01/2013); Other surgical history (04/01/2013); Coronary angioplasty (04/01/2013); Other surgical history (10/30/2018); Other surgical history (10/30/2018); Other surgical history (06/24/2020); MR angio head wo IV contrast (10/15/2014); MR angio neck wo IV contrast (1/18/2020); and MR angio head wo IV contrast (1/18/2020).  Social History:  reports that she has never smoked. She has never been exposed to tobacco smoke. She has never used smokeless tobacco. She reports that she does not drink alcohol and does not use drugs.  Family History: family history includes Anxiety disorder in her mother; Diabetes in an other family member; Heart disease in her father.. Unless otherwise noted, family history is non contributory  The patient’s home medications have been reviewed.  Allergies: Amoxicillin-pot clavulanate and Levofloxacin        ---------------------------------------------------PHYSICAL EXAM--------------------------------------    GENERAL APPEARANCE: Awake and alert.   VITAL SIGNS: As per the nurses' triage record.  Elevated blood pressure  HEENT: Normocephalic, atraumatic. Extraocular muscles are intact. Pupils equal round and reactive to light. Conjunctiva are pink. Negative scleral icterus. Mucous membranes are moist. Tongue  "in the midline. Pharynx was without erythema or exudates, uvula midline  NECK: Soft Nontender and supple, full gross ROM, no meningeal signs.  CHEST: Nontender to palpation. Clear to auscultation bilaterally. No rales, rhonchi, or wheezing.   HEART: S1, S2. Regular rate and rhythm. No murmurs, gallops or rubs.  Strong and equal pulses in the extremities.   ABDOMEN: Soft, nontender, nondistended, positive bowel sounds, no palpable masses.  MUSCULCSKELETAL:  Full gross active range of motion. Ambulating on own with no acute difficulties  NEUROLOGICAL: Awake, alert and oriented x 3. Power intact in the upper and lower extremities. Sensation is intact to light touch in the upper and lower extremities.   IMMUNOLOGICAL: No lymphatic streaking noted   DERM: No petechiae, rashes, or ecchymoses.          ------------------------- NURSING NOTES AND VITALS REVIEWED ---------------------------  The nursing notes within the ED encounter and vital signs as below have been reviewed by myself  /74 (BP Location: Left arm, Patient Position: Lying)   Pulse 61   Temp 35.9 °C (96.7 °F) (Temporal)   Resp 14   Ht 1.626 m (5' 4\")   Wt 59 kg (130 lb)   SpO2 96%   BMI 22.31 kg/m²     Oxygen Saturation Interpretation: 100% room air    The cardiac monitor revealed sinus rhythm with a heart rate in the 70s as interpreted by me. The cardiac monitor was ordered secondary to the patient's heart rate and to monitor the patient for dysrhythmia.       The patient’s available past medical records and past encounters were reviewed.          -----------------------DIAGNOSTIC RESULTS------------------------  LABS:    Labs Reviewed   CBC WITH AUTO DIFFERENTIAL - Abnormal       Result Value    WBC 8.8      nRBC 0.0      RBC 3.92 (*)     Hemoglobin 12.2      Hematocrit 37.6      MCV 96      MCH 31.1      MCHC 32.4      RDW 12.3      Platelets 220      Neutrophils % 68.3      Immature Granulocytes %, Automated 0.3      Lymphocytes % 22.5      " Monocytes % 6.9      Eosinophils % 1.3      Basophils % 0.7      Neutrophils Absolute 5.98 (*)     Immature Granulocytes Absolute, Automated 0.03      Lymphocytes Absolute 1.97      Monocytes Absolute 0.60      Eosinophils Absolute 0.11      Basophils Absolute 0.06     COMPREHENSIVE METABOLIC PANEL - Abnormal    Glucose 134 (*)     Sodium 138      Potassium 4.3      Chloride 109 (*)     Bicarbonate 24      Anion Gap 9 (*)     Urea Nitrogen 27 (*)     Creatinine 0.95      eGFR 55 (*)     Calcium 9.5      Albumin 3.7      Alkaline Phosphatase 118      Total Protein 6.6      AST 20      Bilirubin, Total 0.4      ALT 10     URINALYSIS WITH REFLEX CULTURE AND MICROSCOPIC - Abnormal    Color, Urine Colorless (*)     Appearance, Urine Clear      Specific Gravity, Urine 1.009      pH, Urine 5.5      Protein, Urine NEGATIVE      Glucose, Urine Normal      Blood, Urine NEGATIVE      Ketones, Urine NEGATIVE      Bilirubin, Urine NEGATIVE      Urobilinogen, Urine Normal      Nitrite, Urine NEGATIVE      Leukocyte Esterase, Urine NEGATIVE     LIPASE - Abnormal    Lipase 142 (*)     Narrative:     Venipuncture immediately after or during the administration of Metamizole may lead to falsely low results. Testing should be performed immediately prior to Metamizole dosing.   B-TYPE NATRIURETIC PEPTIDE - Abnormal     (*)     Narrative:        <100 pg/mL - Heart failure unlikely  100-299 pg/mL - Intermediate probability of acute heart                  failure exacerbation. Correlate with clinical                  context and patient history.    >=300 pg/mL - Heart Failure likely. Correlate with clinical                  context and patient history.    BNP testing is performed using different testing methodology at [facility] than at other [facility]. Direct result comparisons should only be made within the same method.      SERIAL TROPONIN-INITIAL - Normal    Troponin I, High Sensitivity 8      Narrative:      Less than 99th percentile of normal range cutoff-  Female and children under 18 years old <14 ng/L; Male <21 ng/L: Negative  Repeat testing should be performed if clinically indicated.     Female and children under 18 years old 14-50 ng/L; Male 21-50 ng/L:  Consistent with possible cardiac damage and possible increased clinical   risk. Serial measurements may help to assess extent of myocardial damage.     >50 ng/L: Consistent with cardiac damage, increased clinical risk and  myocardial infarction. Serial measurements may help assess extent of   myocardial damage.      NOTE: Children less than 1 year old may have higher baseline troponin   levels and results should be interpreted in conjunction with the overall   clinical context.     NOTE: Troponin I testing is performed using a different   testing methodology at [facility] than at other   [facility]. Direct result comparisons should only   be made within the same method.   SERIAL TROPONIN, 1 HOUR - Normal    Troponin I, High Sensitivity 8      Narrative:     Less than 99th percentile of normal range cutoff-  Female and children under 18 years old <14 ng/L; Male <21 ng/L: Negative  Repeat testing should be performed if clinically indicated.     Female and children under 18 years old 14-50 ng/L; Male 21-50 ng/L:  Consistent with possible cardiac damage and possible increased clinical   risk. Serial measurements may help to assess extent of myocardial damage.     >50 ng/L: Consistent with cardiac damage, increased clinical risk and  myocardial infarction. Serial measurements may help assess extent of   myocardial damage.      NOTE: Children less than 1 year old may have higher baseline troponin   levels and results should be interpreted in conjunction with the overall   clinical context.     NOTE: Troponin I testing is performed using a different   testing methodology at [facility] than at other   [facility]. Direct result  comparisons should only   be made within the same method.   SARS-COV-2 PCR - Normal    Coronavirus 2019, PCR Not Detected      Narrative:     This assay has received FDA Emergency Use Authorization (EUA) and is only authorized for the duration of time that circumstances exist to justify the authorization of the emergency use of in vitro diagnostic tests for the detection of SARS-CoV-2 virus and/or diagnosis of COVID-19 infection under section 564(b)(1) of the Act, 21 U.S.C. 360bbb-3(b)(1). This assay is an in vitro diagnostic nucleic acid amplification test for the qualitative detection of SARS-CoV-2 from nasopharyngeal specimens and has been validated for use at [facility]. Negative results do not preclude COVID-19 infections and should not be used as the sole basis for diagnosis, treatment, or other management decisions.     INFLUENZA A AND B PCR - Normal    Flu A Result Not Detected      Flu B Result Not Detected      Narrative:     This assay is an in vitro diagnostic multiplex nucleic acid amplification test for the detection and discrimination of Influenza A & B from nasopharyngeal specimens, and has been validated for use at [facility]. Negative results do not preclude Influenza A/B infections, and should not be used as the sole basis for diagnosis, treatment, or other management decisions. If Influenza A/B and RSV PCR results are negative, testing for Parainfluenza virus, Adenovirus and Metapneumovirus is routinely performed for [facility] pediatric oncology and intensive care inpatients, and is available on other patients by placing an add-on request.   TSH WITH REFLEX TO FREE T4 IF ABNORMAL - Normal    Thyroid Stimulating Hormone 2.85      Narrative:     TSH testing is performed using different testing methodology at [facility] than at [facility]. Direct result comparisons should only be made within the same method.     TROPONIN SERIES-  (INITIAL, 1 HR)    Narrative:     The following orders were created for panel order Troponin I Series, High Sensitivity (0, 1 HR).  Procedure                               Abnormality         Status                     ---------                               -----------         ------                     Troponin I, High Sensiti...[[id number]]  Normal              Final result               Troponin, High Sensitivi...[[id number]]  Normal              Final result                 Please view results for these tests on the individual orders.   URINALYSIS WITH REFLEX CULTURE AND MICROSCOPIC    Narrative:     The following orders were created for panel order Urinalysis with Reflex Culture and Microscopic.  Procedure                               Abnormality         Status                     ---------                               -----------         ------                     Urinalysis with Reflex C...[[id number]]  Abnormal            Final result               Extra Urine Gray Tube[[id number]]                            In process                   Please view results for these tests on the individual orders.   EXTRA URINE GRAY TUBE       As interpreted by me, the above displayed labs are abnormal. All other labs obtained during this visit were within normal range or not returned as of this dictation.      EKG Interpretation    0925 EKG Time:0924  EKG Interpretation time:0925  EKG Interpretation: EKG shows normal sinus rhythm with a rate of 67 bpm, left axis deviation, nonspecific T wave flattening, QTc normal at 445, no evidence of STEMI.    EKG was interpreted by myself independently [DEXTER]           XR chest 2 views   Final Result   Mild left basilar atelectasis. No other acute intrathoracic   abnormality.        Signed by: Erick Taylor 10/14/2024 10:04 AM   Dictation workstation:   [id number]              XR chest 2 views   Final Result   Mild left basilar atelectasis. No other acute intrathoracic   abnormality.         Signed by: Erick Taylor 10/14/2024 10:04 AM   Dictation workstation:   [id number]              ------------------------------ ED COURSE/MEDICAL DECISION MAKING----------------------  Medical Decision Making:   Exam: A medically appropriate exam performed, outlined above, given the known history and presentation.    History obtained from: Review of medical record nursing notes patient      Social Determinants of Health considered during this visit: Takes care of herself at home      PAST MEDICAL HISTORY/Chronic Conditions Affecting Care     has a past medical history of AMI (acute myocardial infarction) (Multi), Anxiety, ASHD (arteriosclerotic heart disease), COVID-19, Depression, Diabetes mellitus (Multi), Gastroparesis, GERD (gastroesophageal reflux disease), Hyperlipidemia, Hypertension, Neuropathy, Other conditions influencing health status, Other conditions influencing health status, Other conditions influencing health status, Other conditions influencing health status, Palpitations, Peripheral vascular angioplasty status, Personal history of other diseases of the circulatory system, Personal history of other diseases of the circulatory system, Personal history of other diseases of the digestive system, Personal history of other diseases of the nervous system and sense organs, Personal history of other endocrine, nutritional and metabolic disease (01/27/2017), Personal history of other endocrine, nutritional and metabolic disease, Personal history of other specified conditions (06/17/2019), Pneumonia, Pulmonary embolism (Multi), PVD (peripheral vascular disease) (CMS-HCC), Sinusitis, Spinal stenosis, Tachycardia, Unspecified secondary cataract, and Urinary tract infection.       CC/HPI Summary, Social Determinants of health, Records Reviewed, DDx, testing done/not done, ED Course, Reassessment, disposition considerations/shared decision making with patient, consults, disposition:   Presents with heart racing  palpitations chest pressure  Plan  Metoprolol  Chest x-ray Mild left basilar atelectasis. No other acute intrathoracic  abnormality.  EKG  CBC  CMP  Troponin  Urine  Lipase  proBNP  COVID  Flu   TSH   Medical Decision Making/Differential Diagnosis:  Differentials acute coronary syndrome versus electrolyte abnormality versus dehydration versus anemia versus arrhythmia versus biliary colic versus reflux versus viral illness  Review  TSH  COVID negative  Lipase pending  Influenza negative  proBNP  Urine negative for leukocytes nitrites blood  Troponin 8 repeat troponin 8  White blood cell count 8.8  Hemoglobin 12.2  Glucose 134  Chloride mildly elevated otherwise electrolytes unremarkable  Anion gap 9   BUN 27 BUN 27 improved from baseline BUN 27 improved from baseline normal creatinine  Normal LFTs  Heart score 6  Patient presents to the emergency department plaints of chest pressure that woke her up in the middle of night with racing heart.  A significant history of coronary artery disease follows with Dr. Hernandez.  EKG today showed normal sinus rhythm at a rate of 67 no ST elevation or arrhythmia noted.  Nonspecific T wave flattening noted.  Heart score is a 6.  Troponin 8 with no change after 2 hours.  Reports that she still feels a discomfort in her upper chest but no further racing.  When she had the initial symptoms she had radiation into the neck and arm.  COVID and flu are negative.  Patient did not take her blood pressure medication this morning was given her metoprolol.  Blood pressure improved.  Thyroid level within normal limits.  Electrolytes unremarkable patient is not hypoglycemic.  BUN elevated with history of the same improved from baseline normal creatinine LFTs within normal limits urine is not consistent with UTI no blood noted in the urine no elevation white blood cell count patient is not anemic chest x-ray showed mild left basilar atelectasis no other acute intrathoracic abnormality proBNP 359.   Patient did not appear to be in fluid overload.   Lipase pending upon admission.  Lipase elevated abdominal exam benign.  Notified admitting team of elevated lipase.   Based on patient's clinical presentation history and symptoms consistent with chest pain, palpitations, elevated lipase admit to hospital for further evaluation and treatment stepdown  Patient seen evaluated with attending physician Dr. Gomez    PROCEDURES  Unless otherwise noted below, none      CONSULTS:   None      ED Course as of 10/14/24 1847   Mon Oct 14, 2024   0925 EKG Time:0924  EKG Interpretation time:0925  EKG Interpretation: EKG shows normal sinus rhythm with a rate of 67 bpm, left axis deviation, nonspecific T wave flattening, QTc normal at 445, no evidence of STEMI.    EKG was interpreted by myself independently [JL]   1158 Spoke with Dr. Peguero patient's primary care physician is agreed to admit the patient under her service for further evaluation and treatment stepdown.  Reviewed laboratory data test results with her.  Due to her history of coronary artery disease patient is a high risk will admit under observation stepdown [TB]      ED Course User Index  [JL] Sky Coley,   [TB] Phuong Phipps, APRN-CNP         Diagnoses as of 10/14/24 1847   Chest pain, unspecified type   Palpitations   Elevated lipase         This patient has remained hemodynamically stable during their ED course.      Critical Care: none       Counseling:  The emergency provider has spoken with the patient and discussed today’s results, in addition to providing specific details for the plan of care and counseling regarding the diagnosis and prognosis.  Questions are answered at this time and they are agreeable with the plan.         --------------------------------- IMPRESSION AND DISPOSITION ---------------------------------    IMPRESSION  1. Chest pain, unspecified type    2. Palpitations    3. Elevated lipase        DISPOSITION  Disposition: Admit  Patient  condition is stable        NOTE: This report was transcribed using voice recognition software. Every effort was made to ensure accuracy; however, inadvertent computerized transcription errors may be present      Phuong Phipps, ADDIE-RYAN  10/14/24 3047

## 2024-10-15 ENCOUNTER — DOCUMENTATION (OUTPATIENT)
Dept: RESEARCH | Age: 89
End: 2024-10-15

## 2024-10-15 ENCOUNTER — APPOINTMENT (OUTPATIENT)
Dept: RADIOLOGY | Facility: HOSPITAL | Age: 89
End: 2024-10-15
Payer: MEDICARE

## 2024-10-15 ENCOUNTER — APPOINTMENT (OUTPATIENT)
Dept: CARDIOLOGY | Facility: HOSPITAL | Age: 89
End: 2024-10-15
Payer: MEDICARE

## 2024-10-15 PROBLEM — R74.8 ELEVATED LIPASE: Status: ACTIVE | Noted: 2024-10-15

## 2024-10-15 LAB
ANION GAP SERPL CALCULATED.3IONS-SCNC: 11 MMOL/L (ref 10–20)
BUN SERPL-MCNC: 24 MG/DL (ref 6–23)
CALCIUM SERPL-MCNC: 9.8 MG/DL (ref 8.6–10.3)
CHLORIDE SERPL-SCNC: 108 MMOL/L (ref 98–107)
CHOLEST SERPL-MCNC: 141 MG/DL (ref 0–199)
CHOLEST/HDLC SERPL: 2.7 {RATIO}
CO2 SERPL-SCNC: 24 MMOL/L (ref 21–32)
CREAT SERPL-MCNC: 1.03 MG/DL (ref 0.5–1.05)
EGFRCR SERPLBLD CKD-EPI 2021: 50 ML/MIN/1.73M*2
ERYTHROCYTE [DISTWIDTH] IN BLOOD BY AUTOMATED COUNT: 12.3 % (ref 11.5–14.5)
GLUCOSE SERPL-MCNC: 102 MG/DL (ref 74–99)
HCT VFR BLD AUTO: 37.8 % (ref 36–46)
HDLC SERPL-MCNC: 52.8 MG/DL
HGB BLD-MCNC: 12.4 G/DL (ref 12–16)
LDLC SERPL CALC-MCNC: 66 MG/DL
MCH RBC QN AUTO: 30.8 PG (ref 26–34)
MCHC RBC AUTO-ENTMCNC: 32.8 G/DL (ref 32–36)
MCV RBC AUTO: 94 FL (ref 80–100)
NON HDL CHOLESTEROL: 88 MG/DL (ref 0–149)
NRBC BLD-RTO: 0 /100 WBCS (ref 0–0)
PLATELET # BLD AUTO: 245 X10*3/UL (ref 150–450)
POTASSIUM SERPL-SCNC: 4 MMOL/L (ref 3.5–5.3)
RBC # BLD AUTO: 4.02 X10*6/UL (ref 4–5.2)
SODIUM SERPL-SCNC: 139 MMOL/L (ref 136–145)
TRIGL SERPL-MCNC: 109 MG/DL (ref 0–149)
VLDL: 22 MG/DL (ref 0–40)
WBC # BLD AUTO: 8.8 X10*3/UL (ref 4.4–11.3)

## 2024-10-15 PROCEDURE — 3430000001 HC RX 343 DIAGNOSTIC RADIOPHARMACEUTICALS: Performed by: NURSE PRACTITIONER

## 2024-10-15 PROCEDURE — A9502 TC99M TETROFOSMIN: HCPCS | Performed by: NURSE PRACTITIONER

## 2024-10-15 PROCEDURE — G0378 HOSPITAL OBSERVATION PER HR: HCPCS

## 2024-10-15 PROCEDURE — 80048 BASIC METABOLIC PNL TOTAL CA: CPT | Performed by: INTERNAL MEDICINE

## 2024-10-15 PROCEDURE — 78452 HT MUSCLE IMAGE SPECT MULT: CPT | Performed by: STUDENT IN AN ORGANIZED HEALTH CARE EDUCATION/TRAINING PROGRAM

## 2024-10-15 PROCEDURE — 85027 COMPLETE CBC AUTOMATED: CPT | Performed by: INTERNAL MEDICINE

## 2024-10-15 PROCEDURE — 36415 COLL VENOUS BLD VENIPUNCTURE: CPT | Performed by: INTERNAL MEDICINE

## 2024-10-15 PROCEDURE — 99222 1ST HOSP IP/OBS MODERATE 55: CPT | Performed by: NURSE PRACTITIONER

## 2024-10-15 PROCEDURE — 80061 LIPID PANEL: CPT | Performed by: INTERNAL MEDICINE

## 2024-10-15 PROCEDURE — 2500000001 HC RX 250 WO HCPCS SELF ADMINISTERED DRUGS (ALT 637 FOR MEDICARE OP): Performed by: INTERNAL MEDICINE

## 2024-10-15 PROCEDURE — 2500000002 HC RX 250 W HCPCS SELF ADMINISTERED DRUGS (ALT 637 FOR MEDICARE OP, ALT 636 FOR OP/ED): Performed by: INTERNAL MEDICINE

## 2024-10-15 PROCEDURE — 99232 SBSQ HOSP IP/OBS MODERATE 35: CPT | Performed by: INTERNAL MEDICINE

## 2024-10-15 PROCEDURE — 2500000005 HC RX 250 GENERAL PHARMACY W/O HCPCS: Performed by: INTERNAL MEDICINE

## 2024-10-15 PROCEDURE — 2500000004 HC RX 250 GENERAL PHARMACY W/ HCPCS (ALT 636 FOR OP/ED): Performed by: INTERNAL MEDICINE

## 2024-10-15 PROCEDURE — 93017 CV STRESS TEST TRACING ONLY: CPT

## 2024-10-15 PROCEDURE — 78452 HT MUSCLE IMAGE SPECT MULT: CPT

## 2024-10-15 PROCEDURE — 96372 THER/PROPH/DIAG INJ SC/IM: CPT | Performed by: INTERNAL MEDICINE

## 2024-10-15 RX ORDER — LIDOCAINE 560 MG/1
3 PATCH PERCUTANEOUS; TOPICAL; TRANSDERMAL DAILY
Status: DISCONTINUED | OUTPATIENT
Start: 2024-10-15 | End: 2024-10-16 | Stop reason: HOSPADM

## 2024-10-15 RX ORDER — LIDOCAINE 560 MG/1
1 PATCH PERCUTANEOUS; TOPICAL; TRANSDERMAL ONCE
Status: DISCONTINUED | OUTPATIENT
Start: 2024-10-15 | End: 2024-10-15

## 2024-10-15 RX ORDER — AMINOPHYLLINE 25 MG/ML
125 INJECTION, SOLUTION INTRAVENOUS AS NEEDED
Status: DISCONTINUED | OUTPATIENT
Start: 2024-10-15 | End: 2024-10-16 | Stop reason: HOSPADM

## 2024-10-15 RX ORDER — REGADENOSON 0.08 MG/ML
0.4 INJECTION, SOLUTION INTRAVENOUS
Status: DISCONTINUED | OUTPATIENT
Start: 2024-10-15 | End: 2024-10-16 | Stop reason: HOSPADM

## 2024-10-15 RX ORDER — AMLODIPINE BESYLATE 10 MG/1
10 TABLET ORAL DAILY
Status: DISCONTINUED | OUTPATIENT
Start: 2024-10-15 | End: 2024-10-16 | Stop reason: HOSPADM

## 2024-10-15 SDOH — SOCIAL STABILITY: SOCIAL NETWORK: HOW OFTEN DO YOU ATTEND CHURCH OR RELIGIOUS SERVICES?: MORE THAN 4 TIMES PER YEAR

## 2024-10-15 SDOH — SOCIAL STABILITY: SOCIAL INSECURITY: ARE THERE ANY APPARENT SIGNS OF INJURIES/BEHAVIORS THAT COULD BE RELATED TO ABUSE/NEGLECT?: NO

## 2024-10-15 SDOH — SOCIAL STABILITY: SOCIAL INSECURITY: DOES ANYONE TRY TO KEEP YOU FROM HAVING/CONTACTING OTHER FRIENDS OR DOING THINGS OUTSIDE YOUR HOME?: NO

## 2024-10-15 SDOH — SOCIAL STABILITY: SOCIAL INSECURITY: HAS ANYONE EVER THREATENED TO HURT YOUR FAMILY OR YOUR PETS?: NO

## 2024-10-15 SDOH — SOCIAL STABILITY: SOCIAL INSECURITY: HAVE YOU HAD ANY THOUGHTS OF HARMING ANYONE ELSE?: NO

## 2024-10-15 SDOH — SOCIAL STABILITY: SOCIAL INSECURITY: POSSIBLE ABUSE REPORTED TO:: ADVOCATE

## 2024-10-15 SDOH — SOCIAL STABILITY: SOCIAL INSECURITY: ABUSE: ADULT

## 2024-10-15 SDOH — SOCIAL STABILITY: SOCIAL INSECURITY: ARE YOU MARRIED, WIDOWED, DIVORCED, SEPARATED, NEVER MARRIED, OR LIVING WITH A PARTNER?: WIDOWED

## 2024-10-15 SDOH — HEALTH STABILITY: PHYSICAL HEALTH: ON AVERAGE, HOW MANY DAYS PER WEEK DO YOU ENGAGE IN MODERATE TO STRENUOUS EXERCISE (LIKE A BRISK WALK)?: 0 DAYS

## 2024-10-15 SDOH — HEALTH STABILITY: PHYSICAL HEALTH: ON AVERAGE, HOW MANY MINUTES DO YOU ENGAGE IN EXERCISE AT THIS LEVEL?: 0 MIN

## 2024-10-15 SDOH — HEALTH STABILITY: MENTAL HEALTH: HOW MANY DRINKS CONTAINING ALCOHOL DO YOU HAVE ON A TYPICAL DAY WHEN YOU ARE DRINKING?: PATIENT DOES NOT DRINK

## 2024-10-15 SDOH — HEALTH STABILITY: MENTAL HEALTH: HOW OFTEN DO YOU HAVE SIX OR MORE DRINKS ON ONE OCCASION?: NEVER

## 2024-10-15 SDOH — SOCIAL STABILITY: SOCIAL INSECURITY: WERE YOU ABLE TO COMPLETE ALL THE BEHAVIORAL HEALTH SCREENINGS?: YES

## 2024-10-15 SDOH — HEALTH STABILITY: MENTAL HEALTH: HOW OFTEN DO YOU HAVE A DRINK CONTAINING ALCOHOL?: NEVER

## 2024-10-15 SDOH — SOCIAL STABILITY: SOCIAL INSECURITY: HAVE YOU HAD THOUGHTS OF HARMING ANYONE ELSE?: NO

## 2024-10-15 SDOH — SOCIAL STABILITY: SOCIAL INSECURITY: ARE YOU OR HAVE YOU BEEN THREATENED OR ABUSED PHYSICALLY, EMOTIONALLY, OR SEXUALLY BY ANYONE?: NO

## 2024-10-15 SDOH — HEALTH STABILITY: MENTAL HEALTH
EXPERIENCED ANY OF THE FOLLOWING LIFE EVENTS: WITNESS TO INJURY TO OR DEATH OF SOMEONE ELSE;SOCIAL LOSS (BANKRUPTCY, DIVORCE, WORK-RELATED STRESS)

## 2024-10-15 SDOH — SOCIAL STABILITY: SOCIAL INSECURITY: DO YOU FEEL ANYONE HAS EXPLOITED OR TAKEN ADVANTAGE OF YOU FINANCIALLY OR OF YOUR PERSONAL PROPERTY?: NO

## 2024-10-15 SDOH — SOCIAL STABILITY: SOCIAL INSECURITY: DO YOU FEEL UNSAFE GOING BACK TO THE PLACE WHERE YOU ARE LIVING?: NO

## 2024-10-15 SDOH — SOCIAL STABILITY: SOCIAL NETWORK: HOW OFTEN DO YOU ATTEND MEETINGS OF THE CLUBS OR ORGANIZATIONS YOU BELONG TO?: MORE THAN 4 TIMES PER YEAR

## 2024-10-15 SDOH — SOCIAL STABILITY: SOCIAL NETWORK: HOW OFTEN DO YOU GET TOGETHER WITH FRIENDS OR RELATIVES?: MORE THAN THREE TIMES A WEEK

## 2024-10-15 ASSESSMENT — COGNITIVE AND FUNCTIONAL STATUS - GENERAL
TOILETING: A LITTLE
TOILETING: A LITTLE
WALKING IN HOSPITAL ROOM: A LITTLE
PERSONAL GROOMING: A LITTLE
MOVING TO AND FROM BED TO CHAIR: A LITTLE
CLIMB 3 TO 5 STEPS WITH RAILING: A LITTLE
DAILY ACTIVITIY SCORE: 23
DAILY ACTIVITIY SCORE: 23
MOBILITY SCORE: 22
MOBILITY SCORE: 22
WALKING IN HOSPITAL ROOM: A LITTLE
CLIMB 3 TO 5 STEPS WITH RAILING: A LITTLE
PATIENT BASELINE BEDBOUND: NO
TOILETING: A LITTLE
CLIMB 3 TO 5 STEPS WITH RAILING: A LITTLE
WALKING IN HOSPITAL ROOM: A LITTLE
DAILY ACTIVITIY SCORE: 22
PATIENT BASELINE BEDBOUND: NO

## 2024-10-15 ASSESSMENT — LIFESTYLE VARIABLES
HOW OFTEN DO YOU HAVE A DRINK CONTAINING ALCOHOL: NEVER
AUDIT-C TOTAL SCORE: 0
AUDIT-C TOTAL SCORE: 0
HOW OFTEN DO YOU HAVE A DRINK CONTAINING ALCOHOL: NEVER
SKIP TO QUESTIONS 9-10: 1
PRESCIPTION_ABUSE_PAST_12_MONTHS: NO
SKIP TO QUESTIONS 9-10: 1
HOW OFTEN DO YOU HAVE 6 OR MORE DRINKS ON ONE OCCASION: NEVER
HOW MANY STANDARD DRINKS CONTAINING ALCOHOL DO YOU HAVE ON A TYPICAL DAY: PATIENT DOES NOT DRINK
AUDIT-C TOTAL SCORE: 0
HOW MANY STANDARD DRINKS CONTAINING ALCOHOL DO YOU HAVE ON A TYPICAL DAY: PATIENT DOES NOT DRINK
SKIP TO QUESTIONS 9-10: 1
AUDIT-C TOTAL SCORE: 0
HOW OFTEN DO YOU HAVE 6 OR MORE DRINKS ON ONE OCCASION: NEVER
AUDIT-C TOTAL SCORE: 0
SUBSTANCE_ABUSE_PAST_12_MONTHS: NO
PRESCIPTION_ABUSE_PAST_12_MONTHS: NO
SUBSTANCE_ABUSE_PAST_12_MONTHS: NO

## 2024-10-15 ASSESSMENT — ENCOUNTER SYMPTOMS
DECREASED CONCENTRATION: 0
SHORTNESS OF BREATH: 1
CONFUSION: 0
COLOR CHANGE: 0
WEAKNESS: 0
FEVER: 0
LIGHT-HEADEDNESS: 0
AGITATION: 0
UNEXPECTED WEIGHT CHANGE: 0
CONSTIPATION: 0
NECK STIFFNESS: 0
WOUND: 0
POLYPHAGIA: 0
VOMITING: 0
FACIAL SWELLING: 0
SEIZURES: 0
ANAL BLEEDING: 0
POLYDIPSIA: 0
RHINORRHEA: 0
SINUS PAIN: 0
ADENOPATHY: 0
EYE PAIN: 0
CHILLS: 0
ACTIVITY CHANGE: 0
JOINT SWELLING: 0
PALPITATIONS: 0
EYE ITCHING: 0
CHEST TIGHTNESS: 1
VOICE CHANGE: 0
ARTHRALGIAS: 0
TREMORS: 0
NERVOUS/ANXIOUS: 0
RECTAL PAIN: 0
NUMBNESS: 0
PHOTOPHOBIA: 0
NECK PAIN: 0
HYPERACTIVE: 0
DYSURIA: 0
STRIDOR: 0
SLEEP DISTURBANCE: 0
HEMATURIA: 0
TROUBLE SWALLOWING: 0
ABDOMINAL PAIN: 0
FACIAL ASYMMETRY: 0
HALLUCINATIONS: 0
EYE DISCHARGE: 0
BRUISES/BLEEDS EASILY: 0
EYE REDNESS: 0
FREQUENCY: 0
DIARRHEA: 0
DYSPHORIC MOOD: 0
CHOKING: 0
MYALGIAS: 0
SINUS PRESSURE: 0
NAUSEA: 0
ABDOMINAL DISTENTION: 0
BLOOD IN STOOL: 0
BACK PAIN: 0
DIAPHORESIS: 0
FLANK PAIN: 0
SPEECH DIFFICULTY: 0
APNEA: 0
DIFFICULTY URINATING: 0
WHEEZING: 0
APPETITE CHANGE: 0
SORE THROAT: 0
FATIGUE: 0
HEADACHES: 0
DIZZINESS: 0
COUGH: 0

## 2024-10-15 ASSESSMENT — PAIN DESCRIPTION - DESCRIPTORS: DESCRIPTORS: ACHING

## 2024-10-15 ASSESSMENT — PATIENT HEALTH QUESTIONNAIRE - PHQ9
2. FEELING DOWN, DEPRESSED OR HOPELESS: NOT AT ALL
2. FEELING DOWN, DEPRESSED OR HOPELESS: NOT AT ALL
1. LITTLE INTEREST OR PLEASURE IN DOING THINGS: NOT AT ALL
SUM OF ALL RESPONSES TO PHQ9 QUESTIONS 1 & 2: 0
1. LITTLE INTEREST OR PLEASURE IN DOING THINGS: NOT AT ALL
SUM OF ALL RESPONSES TO PHQ9 QUESTIONS 1 & 2: 0

## 2024-10-15 ASSESSMENT — ACTIVITIES OF DAILY LIVING (ADL)
ASSISTIVE_DEVICE: EYEGLASSES
TOILETING: INDEPENDENT
PATIENT'S MEMORY ADEQUATE TO SAFELY COMPLETE DAILY ACTIVITIES?: YES
DRESSING YOURSELF: INDEPENDENT
GROOMING: INDEPENDENT
HEARING - RIGHT EAR: FUNCTIONAL
HEARING - LEFT EAR: FUNCTIONAL
WALKS IN HOME: INDEPENDENT
FEEDING YOURSELF: INDEPENDENT
BATHING: INDEPENDENT
ADEQUATE_TO_COMPLETE_ADL: YES
JUDGMENT_ADEQUATE_SAFELY_COMPLETE_DAILY_ACTIVITIES: YES

## 2024-10-15 ASSESSMENT — PAIN SCALES - GENERAL
PAINLEVEL_OUTOF10: 0 - NO PAIN
PAINLEVEL_OUTOF10: 0 - NO PAIN
PAINLEVEL_OUTOF10: 3

## 2024-10-15 ASSESSMENT — PAIN - FUNCTIONAL ASSESSMENT
PAIN_FUNCTIONAL_ASSESSMENT: 0-10
PAIN_FUNCTIONAL_ASSESSMENT: 0-10

## 2024-10-15 ASSESSMENT — PAIN DESCRIPTION - ORIENTATION: ORIENTATION: RIGHT;LEFT

## 2024-10-15 ASSESSMENT — PAIN DESCRIPTION - LOCATION: LOCATION: KNEE

## 2024-10-15 NOTE — H&P
History Of Present Illness  Catie Groves is a 89 y.o. female presenting with chest pain.  Patient has history of coronary artery disease, anxiety, diabetes, depression, hyperlipidemia, hypertension, history of subarachnoid hemorrhage, history of gallstone pancreatitis, laparoscopic cholecystectomy, history of pancreas divisum, history of pulmonary embolism left lower lobe, history of gastroparesis.  early in the morning hours patient woke up with not feeling good had fluttering in the chest felt chest pain and then heaviness in the left arm took her blood pressure and it was extremely high.  Came to the emergency room and admitted for chest pain rule out     Past Medical History  Past Medical History:   Diagnosis Date    AMI (acute myocardial infarction) (Multi)     Anxiety     ASHD (arteriosclerotic heart disease)     COVID-19     Depression     Diabetes mellitus (Multi)     Gastroparesis     Gastroparesis    GERD (gastroesophageal reflux disease)     Hyperlipidemia     Hypertension     Neuropathy     Other conditions influencing health status     Acute Myocardial Infarction    Other conditions influencing health status     Pulmonary Embolism    Other conditions influencing health status     Total Occlusion Of The Femoral Artery    Other conditions influencing health status     Pulmonary Embolism    Palpitations     Peripheral vascular angioplasty status     Status post angioplasty    Personal history of other diseases of the circulatory system     History of hypertension    Personal history of other diseases of the circulatory system     History of peripheral vascular disease    Personal history of other diseases of the digestive system     History of esophageal reflux    Personal history of other diseases of the nervous system and sense organs     History of cataract    Personal history of other endocrine, nutritional and metabolic disease 01/27/2017    History of diabetic neuropathy    Personal history of  other endocrine, nutritional and metabolic disease     History of type 2 diabetes mellitus    Personal history of other specified conditions 06/17/2019    History of urinary frequency    Pneumonia     Pulmonary embolism (Multi)     PVD (peripheral vascular disease) (CMS-HCC)     Sinusitis     Spinal stenosis     Tachycardia     Unspecified secondary cataract     Secondary cataract    Urinary tract infection        Surgical History  Past Surgical History:   Procedure Laterality Date    CHOLECYSTECTOMY  04/01/2013    Cholecystectomy    CORONARY ANGIOPLASTY  04/01/2013    PTCA    MR HEAD ANGIO WO IV CONTRAST  10/15/2014    MR HEAD ANGIO WO IV CONTRAST [facility]    MR HEAD ANGIO WO IV CONTRAST  1/18/2020    MR HEAD ANGIO WO IV CONTRAST [facility]    MR NECK ANGIO WO IV CONTRAST  1/18/2020    MR NECK ANGIO WO IV CONTRAST [facility]    OTHER SURGICAL HISTORY  04/01/2013    Femoral Artery Exploration    OTHER SURGICAL HISTORY  04/01/2013    Endoscopic Retrograde Cholangiopancreatography (ERCP)    OTHER SURGICAL HISTORY  10/30/2018    Cataract Extraction With Insertion Of Intraocular Lens    OTHER SURGICAL HISTORY  10/30/2018    Iridotomy By YAG Laser    OTHER SURGICAL HISTORY  06/24/2020    YAG laser capsulotomy        Social History  She reports that she has never smoked. She has never been exposed to tobacco smoke. She has never used smokeless tobacco. She reports that she does not drink alcohol and does not use drugs.    Family History  Family History   Problem Relation Name Age of Onset    Anxiety disorder Mother      Heart disease Father      Diabetes Other          Allergies  Amoxicillin-pot clavulanate and Levofloxacin    Review of Systems   Constitutional:  Negative for activity change, appetite change, chills, diaphoresis, fatigue, fever and unexpected weight change.   HENT:  Negative for congestion, dental problem, drooling, ear discharge, ear pain, facial swelling, hearing loss,  mouth sores, nosebleeds, postnasal drip, rhinorrhea, sinus pressure, sinus pain, sneezing, sore throat, tinnitus, trouble swallowing and voice change.    Eyes:  Negative for photophobia, pain, discharge, redness, itching and visual disturbance.   Respiratory:  Positive for chest tightness and shortness of breath. Negative for apnea, cough, choking, wheezing and stridor.    Cardiovascular:  Negative for chest pain, palpitations and leg swelling.   Gastrointestinal:  Negative for abdominal distention, abdominal pain, anal bleeding, blood in stool, constipation, diarrhea, nausea, rectal pain and vomiting.   Endocrine: Negative for cold intolerance, heat intolerance, polydipsia, polyphagia and polyuria.   Genitourinary:  Negative for decreased urine volume, difficulty urinating, dysuria, enuresis, flank pain, frequency, genital sores, hematuria and urgency.   Musculoskeletal:  Negative for arthralgias, back pain, gait problem, joint swelling, myalgias, neck pain and neck stiffness.   Skin:  Negative for color change, pallor, rash and wound.   Allergic/Immunologic: Negative for environmental allergies, food allergies and immunocompromised state.   Neurological:  Negative for dizziness, tremors, seizures, syncope, facial asymmetry, speech difficulty, weakness, light-headedness, numbness and headaches.   Hematological:  Negative for adenopathy. Does not bruise/bleed easily.   Psychiatric/Behavioral:  Negative for agitation, behavioral problems, confusion, decreased concentration, dysphoric mood, hallucinations, self-injury, sleep disturbance and suicidal ideas. The patient is not nervous/anxious and is not hyperactive.         Physical Exam  Vitals reviewed.   Constitutional:       Appearance: Normal appearance.   HENT:      Head: Normocephalic and atraumatic.      Right Ear: Tympanic membrane, ear canal and external ear normal.      Left Ear: Tympanic membrane, ear canal and external ear normal.      Nose: Nose normal.     "  Mouth/Throat:      Pharynx: Oropharynx is clear.   Eyes:      Extraocular Movements: Extraocular movements intact.      Conjunctiva/sclera: Conjunctivae normal.      Pupils: Pupils are equal, round, and reactive to light.   Cardiovascular:      Rate and Rhythm: Normal rate and regular rhythm.      Pulses: Normal pulses.      Heart sounds: Normal heart sounds.   Pulmonary:      Effort: Pulmonary effort is normal.      Breath sounds: Normal breath sounds.   Abdominal:      General: Abdomen is flat. Bowel sounds are normal.      Palpations: Abdomen is soft.   Musculoskeletal:      Cervical back: Normal range of motion and neck supple.   Skin:     General: Skin is warm and dry.   Neurological:      General: No focal deficit present.      Mental Status: She is alert and oriented to person, place, and time.   Psychiatric:         Mood and Affect: Mood normal.          Last Recorded Vitals  Blood pressure 165/65, pulse 65, temperature 35.9 °C (96.7 °F), temperature source Temporal, resp. rate 14, height 1.626 m (5' 4\"), weight 59 kg (130 lb), SpO2 96%.    Relevant Results        Scheduled medications  amLODIPine, 5 mg, oral, Daily  aspirin, 81 mg, oral, Daily  atorvastatin, 40 mg, oral, Daily  clopidogrel, 75 mg, oral, Daily  enoxaparin, 40 mg, subcutaneous, q24h  fluticasone, 2 spray, Each Nostril, Daily  gabapentin, 300 mg, oral, BID  glipiZIDE, 5 mg, oral, Daily  losartan, 100 mg, oral, Daily  melatonin, 3 mg, oral, Nightly  metoprolol succinate XL, 100 mg, oral, BID  mirtazapine, 15 mg, oral, Nightly  [START ON 10/15/2024] pantoprazole, 40 mg, oral, Daily before breakfast  polyethylene glycol, 17 g, oral, Daily  ursodiol, 300 mg, oral, BID      Continuous medications     PRN medications  PRN medications: lidocaine, ondansetron ODT  Results for orders placed or performed during the hospital encounter of 10/14/24 (from the past 24 hour(s))   CBC and Auto Differential   Result Value Ref Range    WBC 8.8 4.4 - 11.3 " x10*3/uL    nRBC 0.0 0.0 - 0.0 /100 WBCs    RBC 3.92 (L) 4.00 - 5.20 x10*6/uL    Hemoglobin 12.2 12.0 - 16.0 g/dL    Hematocrit 37.6 36.0 - 46.0 %    MCV 96 80 - 100 fL    MCH 31.1 26.0 - 34.0 pg    MCHC 32.4 32.0 - 36.0 g/dL    RDW 12.3 11.5 - 14.5 %    Platelets 220 150 - 450 x10*3/uL    Neutrophils % 68.3 40.0 - 80.0 %    Immature Granulocytes %, Automated 0.3 0.0 - 0.9 %    Lymphocytes % 22.5 13.0 - 44.0 %    Monocytes % 6.9 2.0 - 10.0 %    Eosinophils % 1.3 0.0 - 6.0 %    Basophils % 0.7 0.0 - 2.0 %    Neutrophils Absolute 5.98 (H) 1.60 - 5.50 x10*3/uL    Immature Granulocytes Absolute, Automated 0.03 0.00 - 0.50 x10*3/uL    Lymphocytes Absolute 1.97 0.80 - 3.00 x10*3/uL    Monocytes Absolute 0.60 0.05 - 0.80 x10*3/uL    Eosinophils Absolute 0.11 0.00 - 0.40 x10*3/uL    Basophils Absolute 0.06 0.00 - 0.10 x10*3/uL   Comprehensive metabolic panel   Result Value Ref Range    Glucose 134 (H) 74 - 99 mg/dL    Sodium 138 136 - 145 mmol/L    Potassium 4.3 3.5 - 5.3 mmol/L    Chloride 109 (H) 98 - 107 mmol/L    Bicarbonate 24 21 - 32 mmol/L    Anion Gap 9 (L) 10 - 20 mmol/L    Urea Nitrogen 27 (H) 6 - 23 mg/dL    Creatinine 0.95 0.50 - 1.05 mg/dL    eGFR 55 (L) >60 mL/min/1.73m*2    Calcium 9.5 8.6 - 10.3 mg/dL    Albumin 3.7 3.4 - 5.0 g/dL    Alkaline Phosphatase 118 33 - 136 U/L    Total Protein 6.6 6.4 - 8.2 g/dL    AST 20 9 - 39 U/L    Bilirubin, Total 0.4 0.0 - 1.2 mg/dL    ALT 10 7 - 45 U/L   Troponin I, High Sensitivity, Initial   Result Value Ref Range    Troponin I, High Sensitivity 8 0 - 13 ng/L   B-type natriuretic peptide   Result Value Ref Range     (H) 0 - 99 pg/mL   ECG 12 lead   Result Value Ref Range    Ventricular Rate 67 BPM    Atrial Rate 67 BPM    WY Interval 140 ms    QRS Duration 86 ms    QT Interval 422 ms    QTC Calculation(Bazett) 445 ms    P Axis 50 degrees    R Axis -3 degrees    T Axis 47 degrees    QRS Count 11 beats    Q Onset 210 ms    P Onset 140 ms    P Offset 192 ms    T  Offset 421 ms    QTC Fredericia 438 ms   Urinalysis with Reflex Culture and Microscopic   Result Value Ref Range    Color, Urine Colorless (N) Light-Yellow, Yellow, Dark-Yellow    Appearance, Urine Clear Clear    Specific Gravity, Urine 1.009 1.005 - 1.035    pH, Urine 5.5 5.0, 5.5, 6.0, 6.5, 7.0, 7.5, 8.0    Protein, Urine NEGATIVE NEGATIVE, 10 (TRACE), 20 (TRACE) mg/dL    Glucose, Urine Normal Normal mg/dL    Blood, Urine NEGATIVE NEGATIVE    Ketones, Urine NEGATIVE NEGATIVE mg/dL    Bilirubin, Urine NEGATIVE NEGATIVE    Urobilinogen, Urine Normal Normal mg/dL    Nitrite, Urine NEGATIVE NEGATIVE    Leukocyte Esterase, Urine NEGATIVE NEGATIVE   Extra Urine Gray Tube   Result Value Ref Range    Extra Tube Hold for add-ons.    Troponin, High Sensitivity, 1 Hour   Result Value Ref Range    Troponin I, High Sensitivity 8 0 - 13 ng/L   Lipase   Result Value Ref Range    Lipase 142 (H) 9 - 82 U/L   TSH with reflex to Free T4 if abnormal   Result Value Ref Range    Thyroid Stimulating Hormone 2.85 0.44 - 3.98 mIU/L   PST Top   Result Value Ref Range    Extra Tube Hold for add-ons.    Sars-CoV-2 PCR   Result Value Ref Range    Coronavirus 2019, PCR Not Detected Not Detected   Influenza A, and B PCR   Result Value Ref Range    Flu A Result Not Detected Not Detected    Flu B Result Not Detected Not Detected     ECG 12 lead    Result Date: 10/14/2024  Normal sinus rhythm Nonspecific ST abnormality Abnormal ECG When compared with ECG of 03-MAR-2024 12:55, Vent. rate has decreased BY  70 BPM Questionable change in QRS duration Confirmed by Homero Brennan ([id number]) on 10/14/2024 4:26:44 PM    XR chest 2 views    Result Date: 10/14/2024  Interpreted By:  Erick Taylor, STUDY: XR CHEST 2 VIEWS   INDICATION: Signs/Symptoms:elevated blood pressure.   COMPARISON: August 23, 2024   ACCESSION NUMBER(S): [id number]   ORDERING CLINICIAN: NIKO NOGUERA   FINDINGS: No consolidation, effusion, edema or pneumothorax. Small band of linear  atelectasis left lung base.   Heart size within normal limits with extensively atherosclerotic aorta.   Multiple lower thoracic vertebral compression fractures similar to prior exam.       Mild left basilar atelectasis. No other acute intrathoracic abnormality.   Signed by: Erick Taylor 10/14/2024 10:04 AM Dictation workstation:   [id number]        Assessment/Plan   Assessment & Plan  Chest pain    Elevated lipase    Anxiety    Arteriosclerotic cardiovascular disease    Gastroparesis    Hypercholesterolemia    Non-insulin dependent type 2 diabetes mellitus (Multi)    Pancreas divisum      Place patient on chest pain protocol  Consult cardiology  Will repeat lipase  Patient has elevated lipase in the past many times  Currently she is not having any abdominal pain  Continue home medications  See orders for details       I spent  minutes in the professional and overall care of this patient.      Darya Peguero MD

## 2024-10-15 NOTE — ED NOTES
This RN assumed care. Pt resting in bed and on the monitor. RR are even and unlabored.      Alicia Bender RN  10/15/24 1220

## 2024-10-15 NOTE — CONSULTS
Inpatient consult to Cardiology  Consult performed by: ADDIE Maguire-CNP  Consult ordered by: Darya Peguero MD  Reason for consult: Chest pain, accelerated hypertension        History Of Present Illness:    Catie Groves is a 89 y.o. female presenting with chest pain, accelerated blood pressure.  Current with Dr. Hernandez.  Past medical history of remote myocardial infarction with angioplasty without stenting to the mid LAD 4/1995, hypertensive disorder, hyperlipidemia, diabetes mellitus type 2, history of PE, gastroparesis.  Patient states slow worsening of her blood pressure over the past 1 to 2 weeks.  States she noted yesterday her blood pressure was systolic above 190.  Shortly after this she began having a chest discomfort which was primarily located in her left shoulder and radiated [facility] down her left arm.  With this she took an oral aspirin and called EMS.  In the emergency department EKG is sinus rhythm without acute ST elevation or T wave inversion.  High-sensitivity troponin values of 8 and 8.  Creatinine 0.9.  Hemoglobin 12.2.  proBNP 389.  Chest x-ray without acute findings.  Blood pressure on admission was a systolic above 170.  She received a one-time dose of 100 mg of metoprolol succinate and was admitted on telemetry for further testing and treatment.  Note patient denies complaints of shortness of breath, near-syncope or syncope, nausea, vomiting, or diaphoresis.      Last Recorded Vitals:  Vitals:    10/15/24 0545 10/15/24 0600 10/15/24 0615 10/15/24 0630   BP:  (!) 151/105  170/65   Pulse: 61 74 72 71   Resp: 16 (!) 21 15 12   Temp:       TempSrc:       SpO2: (!) 93% (!) 93% (!) 92% 95%   Weight:       Height:           Last Labs:  CBC - 10/15/2024:  6:25 AM  8.8 12.4 245    37.8      CMP - 10/14/2024:  9:09 AM  9.5 6.6 20 --- 0.4   3.5 3.7 10 118      PTT - No results in last year.  _   _ _     Troponin I, High Sensitivity   Date/Time Value Ref Range Status   10/14/2024 10:09 AM  8 0 - 13 ng/L Final   10/14/2024 09:09 AM 8 0 - 13 ng/L Final     BNP   Date/Time Value Ref Range Status   10/14/2024 09:09  (H) 0 - 99 pg/mL Final     Hemoglobin A1C   Date/Time Value Ref Range Status   10/14/2023 08:30 AM 6.4 (H) see below % Final   06/28/2023 03:40 PM 6.4 (A) % Final     Comment:          Diagnosis of Diabetes-Adults   Non-Diabetic: < or = 5.6%   Increased risk for developing diabetes: 5.7-6.4%   Diagnostic of diabetes: > or = 6.5%  .       Monitoring of Diabetes                Age (y)     Therapeutic Goal (%)   Adults:          >18           <7.0   Pediatrics:    13-18           <7.5                   7-12           <8.0                   0- 6            7.5-8.5   American Diabetes Association. Diabetes Care 33(S1), Jan 2010.   04/15/2023 09:07 AM 7.6 (A) % Final     Comment:          Diagnosis of Diabetes-Adults   Non-Diabetic: < or = 5.6%   Increased risk for developing diabetes: 5.7-6.4%   Diagnostic of diabetes: > or = 6.5%  .       Monitoring of Diabetes                Age (y)     Therapeutic Goal (%)   Adults:          >18           <7.0   Pediatrics:    13-18           <7.5                   7-12           <8.0                   0- 6            7.5-8.5   American Diabetes Association. Diabetes Care 33(S1), Jan 2010.       LDL Calculated   Date/Time Value Ref Range Status   10/14/2023 08:30 AM 74 <=99 mg/dL Final     Comment:                                 Near   Borderline      AGE      Desirable  Optimal    High     High     Very High     0-19 Y     0 - 109     ---    110-129   >/= 130     ----    20-24 Y     0 - 119     ---    120-159   >/= 160     ----      >24 Y     0 -  99   100-129  130-159   160-189     >/=190     09/27/2018 03:10 AM 55 (L) 65 - 130 MG/DL Final     VLDL   Date/Time Value Ref Range Status   10/14/2023 08:30 AM 34 0 - 40 mg/dL Final   06/28/2023 03:40 PM 30 0 - 40 mg/dL Final   10/19/2022 08:40 AM 29 0 - 40 mg/dL Final   05/11/2022 08:32 AM 31 0 - 40 mg/dL  Final      Results for orders placed or performed during the hospital encounter of 10/14/24 (from the past 24 hour(s))   CBC and Auto Differential   Result Value Ref Range    WBC 8.8 4.4 - 11.3 x10*3/uL    nRBC 0.0 0.0 - 0.0 /100 WBCs    RBC 3.92 (L) 4.00 - 5.20 x10*6/uL    Hemoglobin 12.2 12.0 - 16.0 g/dL    Hematocrit 37.6 36.0 - 46.0 %    MCV 96 80 - 100 fL    MCH 31.1 26.0 - 34.0 pg    MCHC 32.4 32.0 - 36.0 g/dL    RDW 12.3 11.5 - 14.5 %    Platelets 220 150 - 450 x10*3/uL    Neutrophils % 68.3 40.0 - 80.0 %    Immature Granulocytes %, Automated 0.3 0.0 - 0.9 %    Lymphocytes % 22.5 13.0 - 44.0 %    Monocytes % 6.9 2.0 - 10.0 %    Eosinophils % 1.3 0.0 - 6.0 %    Basophils % 0.7 0.0 - 2.0 %    Neutrophils Absolute 5.98 (H) 1.60 - 5.50 x10*3/uL    Immature Granulocytes Absolute, Automated 0.03 0.00 - 0.50 x10*3/uL    Lymphocytes Absolute 1.97 0.80 - 3.00 x10*3/uL    Monocytes Absolute 0.60 0.05 - 0.80 x10*3/uL    Eosinophils Absolute 0.11 0.00 - 0.40 x10*3/uL    Basophils Absolute 0.06 0.00 - 0.10 x10*3/uL   Comprehensive metabolic panel   Result Value Ref Range    Glucose 134 (H) 74 - 99 mg/dL    Sodium 138 136 - 145 mmol/L    Potassium 4.3 3.5 - 5.3 mmol/L    Chloride 109 (H) 98 - 107 mmol/L    Bicarbonate 24 21 - 32 mmol/L    Anion Gap 9 (L) 10 - 20 mmol/L    Urea Nitrogen 27 (H) 6 - 23 mg/dL    Creatinine 0.95 0.50 - 1.05 mg/dL    eGFR 55 (L) >60 mL/min/1.73m*2    Calcium 9.5 8.6 - 10.3 mg/dL    Albumin 3.7 3.4 - 5.0 g/dL    Alkaline Phosphatase 118 33 - 136 U/L    Total Protein 6.6 6.4 - 8.2 g/dL    AST 20 9 - 39 U/L    Bilirubin, Total 0.4 0.0 - 1.2 mg/dL    ALT 10 7 - 45 U/L   Troponin I, High Sensitivity, Initial   Result Value Ref Range    Troponin I, High Sensitivity 8 0 - 13 ng/L   B-type natriuretic peptide   Result Value Ref Range     (H) 0 - 99 pg/mL   ECG 12 lead   Result Value Ref Range    Ventricular Rate 67 BPM    Atrial Rate 67 BPM    NE Interval 140 ms    QRS Duration 86 ms    QT  Interval 422 ms    QTC Calculation(Bazett) 445 ms    P Axis 50 degrees    R Axis -3 degrees    T Axis 47 degrees    QRS Count 11 beats    Q Onset 210 ms    P Onset 140 ms    P Offset 192 ms    T Offset 421 ms    QTC Fredericia 438 ms   Urinalysis with Reflex Culture and Microscopic   Result Value Ref Range    Color, Urine Colorless (N) Light-Yellow, Yellow, Dark-Yellow    Appearance, Urine Clear Clear    Specific Gravity, Urine 1.009 1.005 - 1.035    pH, Urine 5.5 5.0, 5.5, 6.0, 6.5, 7.0, 7.5, 8.0    Protein, Urine NEGATIVE NEGATIVE, 10 (TRACE), 20 (TRACE) mg/dL    Glucose, Urine Normal Normal mg/dL    Blood, Urine NEGATIVE NEGATIVE    Ketones, Urine NEGATIVE NEGATIVE mg/dL    Bilirubin, Urine NEGATIVE NEGATIVE    Urobilinogen, Urine Normal Normal mg/dL    Nitrite, Urine NEGATIVE NEGATIVE    Leukocyte Esterase, Urine NEGATIVE NEGATIVE   Extra Urine Gray Tube   Result Value Ref Range    Extra Tube Hold for add-ons.    Troponin, High Sensitivity, 1 Hour   Result Value Ref Range    Troponin I, High Sensitivity 8 0 - 13 ng/L   Lipase   Result Value Ref Range    Lipase 142 (H) 9 - 82 U/L   TSH with reflex to Free T4 if abnormal   Result Value Ref Range    Thyroid Stimulating Hormone 2.85 0.44 - 3.98 mIU/L   PST Top   Result Value Ref Range    Extra Tube Hold for add-ons.    Sars-CoV-2 PCR   Result Value Ref Range    Coronavirus 2019, PCR Not Detected Not Detected   Influenza A, and B PCR   Result Value Ref Range    Flu A Result Not Detected Not Detected    Flu B Result Not Detected Not Detected   CBC   Result Value Ref Range    WBC 8.8 4.4 - 11.3 x10*3/uL    nRBC 0.0 0.0 - 0.0 /100 WBCs    RBC 4.02 4.00 - 5.20 x10*6/uL    Hemoglobin 12.4 12.0 - 16.0 g/dL    Hematocrit 37.8 36.0 - 46.0 %    MCV 94 80 - 100 fL    MCH 30.8 26.0 - 34.0 pg    MCHC 32.8 32.0 - 36.0 g/dL    RDW 12.3 11.5 - 14.5 %    Platelets 245 150 - 450 x10*3/uL       Last I/O:  No intake/output data recorded.    Past Cardiology Tests (Last 3  Years):  EKG:  ECG 12 lead 10/14/2024 : Sinus rhythm without acute ST elevation or T wave inversion    Echo:  Transthoracic Echo (TTE) Complete 03/04/2024  Transthoracic Echo (TTE) Complete    Result Date: 3/4/2024           [place] [address]            Phone [phone] TRANSTHORACIC ECHOCARDIOGRAM REPORT  Patient Name:     TELMA MENJIVAR      Reading Physician:  Kaitlynn Zavala MD Study Date:       3/4/2024             Ordering Provider:  [id number] SANDY ZAVALA MRN/PID:          [id number]             Fellow: Accession#:       [id number]         Nurse: Date of           3/10/1929 / 89 years Sonographer:        Parag Bolden RDCS Birth/Age: Gender:           F                    Additional Staff: Height:           161.00 cm            Admit Date: Weight:           63.00 kg             Admission Status:   Inpatient - Routine BSA / BMI:        1.66 m2 / 24.30      Department          [facility], [place], operated by [facility]                   kg/m2                Location: Blood Pressure: 144 /70 mmHg Study Type:    TRANSTHORACIC ECHO (TTE) COMPLETE Diagnosis/ICD: [id number] Indication:    SOB CPT Codes:     Echo Complete w Full [id number] Patient History: Diabetes:          Yes Pertinent History: Chest Pain, CHF, HTN, Hyperlipidemia, LE Edema and Murmur.                    Renal dx, PVD, Abn Ekg. Study Detail: The following Echo studies were performed: 2D, M-Mode, Doppler and               color flow. Technically challenging study due to poor acoustic               windows and Covid Status. Unable to obtain suprasternal notch and               subcostal view.  PHYSICIAN INTERPRETATION: Left Ventricle: Left ventricular systolic function is normal, with an estimated ejection fraction of 60%. The calculated ejection fraction is normal at 60 % using the Castillo's  Bi-plane MOD calculation. There are no regional wall motion abnormalities. The left ventricular cavity size is normal. Spectral Doppler shows an impaired relaxation pattern of left ventricular diastolic filling. Left Atrium: The left atrium is normal in size. Right Ventricle: The right ventricle is normal in size. There is normal right ventriclar wall thickness. There is normal right ventricular global systolic function. Right Atrium: The right atrium is normal in size. Aortic Valve: The aortic valve is trileaflet. The aortic valve appears tricuspid and non-restricted. There is mild aortic valve cusp calcification. There is evidence of mildly elevated transaortic gradients consistent with sclerosis of the aortic valve. There is trace to mild aortic valve regurgitation. The peak instantaneous gradient of the aortic valve is 5.4 mmHg. The mean gradient of the aortic valve is 3.0 mmHg. Mitral Valve: The mitral valve is normal in structure. There is normal mitral valve leaflet mobility. There is mild mitral annular calcification. There is mild to moderate mitral valve regurgitation which is centrally directed. Tricuspid Valve: The tricuspid valve is structurally normal. There is normal tricuspid valve leaflet mobility. There is mild tricuspid regurgitation. Pulmonic Valve: The pulmonic valve is structurally normal. There is trace to mild pulmonic valve regurgitation. Pericardium: There is no pericardial effusion noted. There is a pericardial fat pad present. Aorta: The aortic root is normal. There is no dilatation of the aortic arch. There is no dilatation of the ascending aorta. There is no dilatation of the aortic root. There is plaque visualized in the ascending aorta, which is classified as a Grade 2 [mild (focal or diffuse) intimal thickening of 2-3 mm] atherosclerosis. Pulmonary Artery: The pulmonary artery is normal in size. The tricuspid regurgitant velocity is 2.20 m/s, and with an estimated right atrial  pressure of 3 mmHg, the estimated pulmonary artery pressure is normal with the RVSP at 22.4 mmHg.  CONCLUSIONS:  1. Left ventricular systolic function is normal with a 60% estimated ejection fraction.  2. Spectral Doppler shows an impaired relaxation pattern of left ventricular diastolic filling.  3. Mild to moderate mitral valve regurgitation.  4. Mild tricuspid regurgitation is visualized.  5. Aortic valve sclerosis.  6. There is plaque visualized in the ascending aorta. QUANTITATIVE DATA SUMMARY: 2D MEASUREMENTS:                          Normal Ranges: LAs:           3.50 cm   (2.7-4.0cm) IVSd:          0.96 cm   (0.6-1.1cm) LVPWd:         0.92 cm   (0.6-1.1cm) LVIDd:         4.02 cm   (3.9-5.9cm) LVIDs:         2.67 cm LV Mass Index: 70.7 g/m2 LV % FS        33.6 % LA VOLUME:                               Normal Ranges: LA Vol A4C:        51.2 ml    (22+/-6mL/m2) LA Vol A2C:        39.8 ml LA Vol BP:         45.2 ml LA Vol Index A4C:  30.8ml/m2 LA Vol Index A2C:  24.0 ml/m2 LA Vol Index BP:   27.1 ml/m2 LA Area A4C:       17.0 cm2 LA Area A2C:       15.0 cm2 LA Major Axis A4C: 4.8 cm LA Major Axis A2C: 4.8 cm LA Volume Index:   26.0 ml/m2 LA Vol A4C:        47.0 ml LA Vol A2C:        39.0 ml RA VOLUME BY A/L METHOD:                       Normal Ranges: RA Area A4C: 13.0 cm2 AORTA MEASUREMENTS:                      Normal Ranges: Ao Sinus, d: 3.30 cm (2.1-3.5cm) LV SYSTOLIC FUNCTION BY 2D PLANIMETRY (MOD):                     Normal Ranges: EF-A4C View: 60.4 % (>=55%) EF-A2C View: 58.0 % EF-Biplane:  59.6 % LV DIASTOLIC FUNCTION:                        Normal Ranges: MV Peak E:    0.52 m/s (0.7-1.2 m/s) MV Peak A:    1.14 m/s (0.42-0.7 m/s) E/A Ratio:    0.46     (1.0-2.2) MV e'         0.04 m/s (>8.0) MV lateral e' 0.04 m/s MV medial e'  0.04 m/s E/e' Ratio:   13.08    (<8.0) MITRAL VALVE:                 Normal Ranges: MV DT: 206 msec (150-240msec) AORTIC VALVE:                                   Normal Ranges:  AoV Vmax:                1.16 m/s (<=1.7m/s) AoV Peak P.4 mmHg (<20mmHg) AoV Mean PG:             3.0 mmHg (1.7-11.5mmHg) LVOT Max Bishop:            0.85 m/s (<=1.1m/s) AoV VTI:                 31.30 cm (18-25cm) LVOT VTI:                20.10 cm LVOT Diameter:           1.90 cm  (1.8-2.4cm) AoV Area, VTI:           1.82 cm2 (2.5-5.5cm2) AoV Area,Vmax:           2.08 cm2 (2.5-4.5cm2) AoV Dimensionless Index: 0.64  RIGHT VENTRICLE: RV Basal 3.00 cm RV Mid   2.71 cm RV Major 5.7 cm TAPSE:   20.0 mm RV s'    0.09 m/s TRICUSPID VALVE/RVSP:                             Normal Ranges: Peak TR Velocity: 2.20 m/s RV Syst Pressure: 22.4 mmHg (< 30mmHg) PULMONIC VALVE:                      Normal Ranges: PV Max Bishop: 0.7 m/s  (0.6-0.9m/s) PV Max P.8 mmHg  [id number] David Hernandez MD Electronically signed on 3/4/2024 at 6:09:50 PM  ** Final **        Ejection Fractions:  EF   Date/Time Value Ref Range Status   2024 02:16 PM 60 %        Past Medical History:  She has a past medical history of AMI (acute myocardial infarction) (Multi), Anxiety, ASHD (arteriosclerotic heart disease), COVID-19, Depression, Diabetes mellitus (Multi), Gastroparesis, GERD (gastroesophageal reflux disease), Hyperlipidemia, Hypertension, Neuropathy, Other conditions influencing health status, Other conditions influencing health status, Other conditions influencing health status, Other conditions influencing health status, Palpitations, Peripheral vascular angioplasty status, Personal history of other diseases of the circulatory system, Personal history of other diseases of the circulatory system, Personal history of other diseases of the digestive system, Personal history of other diseases of the nervous system and sense organs, Personal history of other endocrine, nutritional and metabolic disease (2017), Personal history of other endocrine, nutritional and metabolic disease, Personal history of other specified conditions  (06/17/2019), Pneumonia, Pulmonary embolism (Multi), PVD (peripheral vascular disease) ([facility], [facility]), Sinusitis, Spinal stenosis, Tachycardia, Unspecified secondary cataract, and Urinary tract infection.    Past Surgical History:  She has a past surgical history that includes Cholecystectomy (04/01/2013); Other surgical history (04/01/2013); Other surgical history (04/01/2013); Coronary angioplasty (04/01/2013); Other surgical history (10/30/2018); Other surgical history (10/30/2018); Other surgical history (06/24/2020); MR angio head wo IV contrast (10/15/2014); MR angio neck wo IV contrast (1/18/2020); and MR angio head wo IV contrast (1/18/2020).      Social History:  She reports that she has never smoked. She has never been exposed to tobacco smoke. She has never used smokeless tobacco. She reports that she does not drink alcohol and does not use drugs.    Family History:  Family History   Problem Relation Name Age of Onset    Anxiety disorder Mother      Heart disease Father      Diabetes Other          Allergies:  Amoxicillin-pot clavulanate and Levofloxacin    Inpatient Medications:  Scheduled medications   Medication Dose Route Frequency    amLODIPine  10 mg oral Daily    aspirin  81 mg oral Daily    atorvastatin  40 mg oral Daily    clopidogrel  75 mg oral Daily    enoxaparin  40 mg subcutaneous q24h    fluticasone  2 spray Each Nostril Daily    gabapentin  300 mg oral BID    glipiZIDE  5 mg oral Daily    losartan  100 mg oral Daily    melatonin  3 mg oral Nightly    metoprolol succinate XL  100 mg oral BID    mirtazapine  15 mg oral Nightly    pantoprazole  40 mg oral Daily before breakfast    polyethylene glycol  17 g oral Daily    ursodiol  300 mg oral BID     PRN medications   Medication    lidocaine    ondansetron ODT     Continuous Medications   Medication Dose Last Rate     Outpatient Medications:  Current Outpatient Medications   Medication Instructions    acetaminophen (TYLENOL) 650 mg, oral, Every 4  hours PRN    amLODIPine (NORVASC) 5 mg, oral, Daily    aspirin 81 mg, oral, Daily    atorvastatin (LIPITOR) 40 mg, oral, Daily    BIOTIN ORAL 1 each, oral, Daily    blood sugar diagnostic (Contour Test Strips) strip 100 strips, miscellaneous, 2 times daily    cholecalciferol (Vitamin D3) 50 mcg (2,000 unit) capsule 1 capsule, oral, Daily    clopidogrel (PLAVIX) 75 mg, oral, Daily    diclofenac sodium (VOLTAREN) 4 g, Topical, 4 times daily    fluticasone (Flonase) 50 mcg/actuation nasal spray 2 sprays, Each Nostril, Daily    gabapentin (NEURONTIN) 300 mg, oral, 2 times daily    glipiZIDE (GLUCOTROL) 5 mg, oral, Daily    Lactobacillus acidophilus (PROBIOTIC ORAL) 1 each, oral, Daily    lancets (Microlet Lancet) misc Use to test 2-3 daily.    lidocaine HCL 4 % adhesive patch,medicated 1 patch, topical (top), Every 12 hours    loratadine (CLARITIN) 10 mg, oral, Daily    loratadine (CLARITIN) 10 mg, oral, Daily    losartan (COZAAR) 100 mg, oral, Daily    melatonin 3 mg, oral, Nightly    metoprolol succinate XL (TOPROL-XL) 100 mg, oral, 2 times daily    mirtazapine (REMERON) 15 mg, oral, Nightly    multivit with minerals/lutein (VISION PLUS LUTEIN ORAL) 1 each, oral, Daily, Take as directed     nitroglycerin (NITROSTAT) 0.4 mg, sublingual, Every 5 min PRN    ondansetron ODT (ZOFRAN-ODT) 4 mg, oral, Every 8 hours PRN    pantoprazole (PROTONIX) 40 mg, oral, Daily before breakfast, Do not crush, chew, or split.    pantoprazole (PROTONIX) 40 mg, oral, Daily before breakfast    traMADol (ULTRAM) 50 mg, oral, Every 8 hours PRN    ursodiol (ACTIGALL) 300 mg, oral, 2 times daily       Physical Exam:  General: alert, oriented x 3, very pleasant  HEENT: normal cephalic, atraumatic, no scleral icterus  Neck: No JVD, bruit or thrill, masses or tenderness   Heart: S1/S2, Rate 70, Rhythm regular, no s3 or s4, no murmur, thrill, or heaves at PMI.   Lungs: Clear, equal expansion and excursion, no wheezes, crackles, rhales or rhonci room  air.  No conversational dyspnea appreciated.  No tachypnea.  No pain with deep aspiration.   Abdomen: bowel sounds x 4, soft, non-tender to light and deep palpation, No masses, guarding, or CVA tenderness   Genitourinary: deferred   Extremities: No significant upper or lower extremity matthew appreciated.       Assessment/Plan     Chest pain  Accelerated hypertension  History of coronary artery disease with remote angioplasty to mid LAD  Hyperlipidemia  History of pulmonary emboli  Diabetes mellitus type 2    Overall impression:    10/15: As above.  Chest pain at home while seated.  1 episode.  Patient notes worsening blood pressure for 1 week prior to this.  She does have a history of hypertension and has been compliant with her home medications.  States no medication changes recently.  No illness otherwise.  Symptoms are typical anginal pain, however was not accompanied by any other significant cardiac symptoms.  Appears well at time of assessment.  Troponins flat negative.  EKG nonischemic.  Despite this patient does have a history of coronary disease and at this point is a full code I would like all available testing.  Certainly do not believe a cardiac catheterization is necessary at this point, however it has been approximate 11 years since her last nuclear stress test.  Patient is well-appearing 89-year-old, thus we will proceed with nuclear stress test this morning.  NPO.  For her blood pressure we will continue with her home medications I have increased her amlodipine to 10 mg daily, she notes that she does not have issues with peripheral edema, this will also additionally help any coronary vasospasm that may be occurring.  Further recommendations post nuclear stress test.  Will follow with you.      Code Status:  Full Code    I spent 60 minutes in the professional and overall care of this patient.        Jovanny Linares, APRN-CNP

## 2024-10-15 NOTE — CARE PLAN
The patient's goals for the shift include maintain comfort    The clinical goals for the shift include HDS

## 2024-10-15 NOTE — NURSING NOTE
Patient arrived to unit from ED. Went for ST.  Alert and oriented.  Denies chest pain. Denies shortness of breath.  Call light and belongings in reach. Oriented to room. Denies any needs at this time.

## 2024-10-15 NOTE — PROGRESS NOTES
"Catie Groves is a 89 y.o. female on day 0 of admission presenting with Chest pain.    Subjective   No chest pain today       Objective     Physical Exam  Vitals reviewed.   Constitutional:       Appearance: Normal appearance.   HENT:      Head: Normocephalic and atraumatic.      Right Ear: Tympanic membrane, ear canal and external ear normal.      Left Ear: Tympanic membrane, ear canal and external ear normal.      Nose: Nose normal.      Mouth/Throat:      Pharynx: Oropharynx is clear.   Eyes:      Extraocular Movements: Extraocular movements intact.      Conjunctiva/sclera: Conjunctivae normal.      Pupils: Pupils are equal, round, and reactive to light.   Cardiovascular:      Rate and Rhythm: Normal rate and regular rhythm.      Pulses: Normal pulses.      Heart sounds: Normal heart sounds.   Pulmonary:      Effort: Pulmonary effort is normal.      Breath sounds: Normal breath sounds.   Abdominal:      General: Abdomen is flat. Bowel sounds are normal.      Palpations: Abdomen is soft.   Musculoskeletal:      Cervical back: Normal range of motion and neck supple.   Skin:     General: Skin is warm and dry.   Neurological:      General: No focal deficit present.      Mental Status: She is alert and oriented to person, place, and time.   Psychiatric:         Mood and Affect: Mood normal.         Last Recorded Vitals  Blood pressure 150/71, pulse 88, temperature 36.3 °C (97.3 °F), temperature source Oral, resp. rate 16, height 1.626 m (5' 4\"), weight 59 kg (130 lb), SpO2 91%.  Intake/Output last 3 Shifts:  No intake/output data recorded.    Relevant Results               Results for orders placed or performed during the hospital encounter of 10/14/24 (from the past 24 hour(s))   CBC   Result Value Ref Range    WBC 8.8 4.4 - 11.3 x10*3/uL    nRBC 0.0 0.0 - 0.0 /100 WBCs    RBC 4.02 4.00 - 5.20 x10*6/uL    Hemoglobin 12.4 12.0 - 16.0 g/dL    Hematocrit 37.8 36.0 - 46.0 %    MCV 94 80 - 100 fL    MCH 30.8 26.0 - 34.0 " pg    MCHC 32.8 32.0 - 36.0 g/dL    RDW 12.3 11.5 - 14.5 %    Platelets 245 150 - 450 x10*3/uL   Basic Metabolic Panel   Result Value Ref Range    Glucose 102 (H) 74 - 99 mg/dL    Sodium 139 136 - 145 mmol/L    Potassium 4.0 3.5 - 5.3 mmol/L    Chloride 108 (H) 98 - 107 mmol/L    Bicarbonate 24 21 - 32 mmol/L    Anion Gap 11 10 - 20 mmol/L    Urea Nitrogen 24 (H) 6 - 23 mg/dL    Creatinine 1.03 0.50 - 1.05 mg/dL    eGFR 50 (L) >60 mL/min/1.73m*2    Calcium 9.8 8.6 - 10.3 mg/dL   Lipid Panel   Result Value Ref Range    Cholesterol 141 0 - 199 mg/dL    HDL-Cholesterol 52.8 mg/dL    Cholesterol/HDL Ratio 2.7     LDL Calculated 66 <=99 mg/dL    VLDL 22 0 - 40 mg/dL    Triglycerides 109 0 - 149 mg/dL    Non HDL Cholesterol 88 0 - 149 mg/dL                    Assessment/Plan   Assessment & Plan  Chest pain    Elevated lipase    Anxiety    Arteriosclerotic cardiovascular disease    Gastroparesis    Hypercholesterolemia    Non-insulin dependent type 2 diabetes mellitus (Multi)    Pancreas divisum    No MI   plan for nuclear stress test today  Blood pressure borderline high  No abdominal pain   blood sugar okay       I spent  minutes in the professional and overall care of this patient.      Darya Peguero MD

## 2024-10-16 ENCOUNTER — PHARMACY VISIT (OUTPATIENT)
Dept: PHARMACY | Facility: CLINIC | Age: 89
End: 2024-10-16
Payer: MEDICARE

## 2024-10-16 VITALS
OXYGEN SATURATION: 97 % | HEART RATE: 63 BPM | SYSTOLIC BLOOD PRESSURE: 141 MMHG | DIASTOLIC BLOOD PRESSURE: 50 MMHG | RESPIRATION RATE: 14 BRPM | HEIGHT: 64 IN | WEIGHT: 130 LBS | TEMPERATURE: 98.2 F | BODY MASS INDEX: 22.2 KG/M2

## 2024-10-16 LAB
AMYLASE SERPL-CCNC: 57 U/L (ref 29–103)
ANION GAP SERPL CALCULATED.3IONS-SCNC: 14 MMOL/L (ref 10–20)
BUN SERPL-MCNC: 28 MG/DL (ref 6–23)
CALCIUM SERPL-MCNC: 8.8 MG/DL (ref 8.6–10.3)
CHLORIDE SERPL-SCNC: 107 MMOL/L (ref 98–107)
CO2 SERPL-SCNC: 21 MMOL/L (ref 21–32)
CREAT SERPL-MCNC: 1.2 MG/DL (ref 0.5–1.05)
EGFRCR SERPLBLD CKD-EPI 2021: 42 ML/MIN/1.73M*2
ERYTHROCYTE [DISTWIDTH] IN BLOOD BY AUTOMATED COUNT: 12.3 % (ref 11.5–14.5)
GLUCOSE BLD MANUAL STRIP-MCNC: 204 MG/DL (ref 74–99)
GLUCOSE BLD MANUAL STRIP-MCNC: 67 MG/DL (ref 74–99)
GLUCOSE BLD MANUAL STRIP-MCNC: 89 MG/DL (ref 74–99)
GLUCOSE SERPL-MCNC: 62 MG/DL (ref 74–99)
HCT VFR BLD AUTO: 36.3 % (ref 36–46)
HGB BLD-MCNC: 11.9 G/DL (ref 12–16)
LIPASE SERPL-CCNC: 43 U/L (ref 9–82)
MCH RBC QN AUTO: 31.2 PG (ref 26–34)
MCHC RBC AUTO-ENTMCNC: 32.8 G/DL (ref 32–36)
MCV RBC AUTO: 95 FL (ref 80–100)
NRBC BLD-RTO: 0 /100 WBCS (ref 0–0)
PLATELET # BLD AUTO: 216 X10*3/UL (ref 150–450)
POTASSIUM SERPL-SCNC: 3.7 MMOL/L (ref 3.5–5.3)
RBC # BLD AUTO: 3.81 X10*6/UL (ref 4–5.2)
SODIUM SERPL-SCNC: 138 MMOL/L (ref 136–145)
WBC # BLD AUTO: 9 X10*3/UL (ref 4.4–11.3)

## 2024-10-16 PROCEDURE — RXMED WILLOW AMBULATORY MEDICATION CHARGE

## 2024-10-16 PROCEDURE — 82150 ASSAY OF AMYLASE: CPT | Performed by: INTERNAL MEDICINE

## 2024-10-16 PROCEDURE — 80048 BASIC METABOLIC PNL TOTAL CA: CPT | Performed by: INTERNAL MEDICINE

## 2024-10-16 PROCEDURE — 2500000004 HC RX 250 GENERAL PHARMACY W/ HCPCS (ALT 636 FOR OP/ED): Performed by: INTERNAL MEDICINE

## 2024-10-16 PROCEDURE — 83690 ASSAY OF LIPASE: CPT | Performed by: INTERNAL MEDICINE

## 2024-10-16 PROCEDURE — 2500000001 HC RX 250 WO HCPCS SELF ADMINISTERED DRUGS (ALT 637 FOR MEDICARE OP): Performed by: NURSE PRACTITIONER

## 2024-10-16 PROCEDURE — 93018 CV STRESS TEST I&R ONLY: CPT | Performed by: INTERNAL MEDICINE

## 2024-10-16 PROCEDURE — 99238 HOSP IP/OBS DSCHRG MGMT 30/<: CPT | Performed by: INTERNAL MEDICINE

## 2024-10-16 PROCEDURE — 90662 IIV NO PRSV INCREASED AG IM: CPT | Performed by: INTERNAL MEDICINE

## 2024-10-16 PROCEDURE — 96372 THER/PROPH/DIAG INJ SC/IM: CPT | Performed by: INTERNAL MEDICINE

## 2024-10-16 PROCEDURE — 93016 CV STRESS TEST SUPVJ ONLY: CPT | Performed by: INTERNAL MEDICINE

## 2024-10-16 PROCEDURE — 2500000001 HC RX 250 WO HCPCS SELF ADMINISTERED DRUGS (ALT 637 FOR MEDICARE OP): Performed by: INTERNAL MEDICINE

## 2024-10-16 PROCEDURE — G0008 ADMIN INFLUENZA VIRUS VAC: HCPCS | Performed by: INTERNAL MEDICINE

## 2024-10-16 PROCEDURE — G0378 HOSPITAL OBSERVATION PER HR: HCPCS

## 2024-10-16 PROCEDURE — 82947 ASSAY GLUCOSE BLOOD QUANT: CPT

## 2024-10-16 PROCEDURE — 2500000002 HC RX 250 W HCPCS SELF ADMINISTERED DRUGS (ALT 637 FOR MEDICARE OP, ALT 636 FOR OP/ED): Performed by: INTERNAL MEDICINE

## 2024-10-16 PROCEDURE — 36415 COLL VENOUS BLD VENIPUNCTURE: CPT | Performed by: INTERNAL MEDICINE

## 2024-10-16 PROCEDURE — 85027 COMPLETE CBC AUTOMATED: CPT | Performed by: INTERNAL MEDICINE

## 2024-10-16 RX ORDER — AMLODIPINE BESYLATE 10 MG/1
10 TABLET ORAL DAILY
Qty: 30 TABLET | Refills: 0 | Status: SHIPPED | OUTPATIENT
Start: 2024-10-17

## 2024-10-16 ASSESSMENT — PAIN DESCRIPTION - LOCATION: LOCATION: KNEE

## 2024-10-16 ASSESSMENT — COGNITIVE AND FUNCTIONAL STATUS - GENERAL
DAILY ACTIVITIY SCORE: 22
CLIMB 3 TO 5 STEPS WITH RAILING: A LITTLE
PERSONAL GROOMING: A LITTLE
TOILETING: A LITTLE
WALKING IN HOSPITAL ROOM: A LITTLE
MOBILITY SCORE: 22

## 2024-10-16 ASSESSMENT — PAIN SCALES - GENERAL
PAINLEVEL_OUTOF10: 0 - NO PAIN
PAINLEVEL_OUTOF10: 3
PAINLEVEL_OUTOF10: 0 - NO PAIN

## 2024-10-16 ASSESSMENT — PAIN DESCRIPTION - ORIENTATION: ORIENTATION: RIGHT;LEFT

## 2024-10-16 ASSESSMENT — PAIN DESCRIPTION - DESCRIPTORS: DESCRIPTORS: ACHING

## 2024-10-16 ASSESSMENT — PAIN - FUNCTIONAL ASSESSMENT
PAIN_FUNCTIONAL_ASSESSMENT: 0-10
PAIN_FUNCTIONAL_ASSESSMENT: 0-10

## 2024-10-16 NOTE — NURSING NOTE
Patients blood sugar 66. OJ and sugar packets given. Rechecked BS 89. Dr. Peguero notified held glipizide per .

## 2024-10-16 NOTE — PROGRESS NOTES
"Catie Groves is a 89 y.o. female on day 0 of admission presenting with Chest pain.    Subjective   No chest pain today       Objective     Physical Exam  Vitals reviewed.   Constitutional:       Appearance: Normal appearance.   HENT:      Head: Normocephalic and atraumatic.      Right Ear: Tympanic membrane, ear canal and external ear normal.      Left Ear: Tympanic membrane, ear canal and external ear normal.      Nose: Nose normal.      Mouth/Throat:      Pharynx: Oropharynx is clear.   Eyes:      Extraocular Movements: Extraocular movements intact.      Conjunctiva/sclera: Conjunctivae normal.      Pupils: Pupils are equal, round, and reactive to light.   Cardiovascular:      Rate and Rhythm: Normal rate and regular rhythm.      Pulses: Normal pulses.      Heart sounds: Normal heart sounds.   Pulmonary:      Effort: Pulmonary effort is normal.      Breath sounds: Normal breath sounds.   Abdominal:      General: Abdomen is flat. Bowel sounds are normal.      Palpations: Abdomen is soft.   Musculoskeletal:      Cervical back: Normal range of motion and neck supple.   Skin:     General: Skin is warm and dry.   Neurological:      General: No focal deficit present.      Mental Status: She is alert and oriented to person, place, and time.   Psychiatric:         Mood and Affect: Mood normal.         Last Recorded Vitals  Blood pressure 141/50, pulse 63, temperature 36.8 °C (98.2 °F), temperature source Oral, resp. rate 14, height 1.626 m (5' 4\"), weight 59 kg (130 lb), SpO2 97%.  Intake/Output last 3 Shifts:  I/O last 3 completed shifts:  In: 675 (11.4 mL/kg) [P.O.:675]  Out: - (0 mL/kg)   Weight: 59 kg     Relevant Results               Results for orders placed or performed during the hospital encounter of 10/14/24 (from the past 24 hours)   Amylase   Result Value Ref Range    Amylase 57 29 - 103 U/L   Lipase   Result Value Ref Range    Lipase 43 9 - 82 U/L   CBC   Result Value Ref Range    WBC 9.0 4.4 - 11.3 " x10*3/uL    nRBC 0.0 0.0 - 0.0 /100 WBCs    RBC 3.81 (L) 4.00 - 5.20 x10*6/uL    Hemoglobin 11.9 (L) 12.0 - 16.0 g/dL    Hematocrit 36.3 36.0 - 46.0 %    MCV 95 80 - 100 fL    MCH 31.2 26.0 - 34.0 pg    MCHC 32.8 32.0 - 36.0 g/dL    RDW 12.3 11.5 - 14.5 %    Platelets 216 150 - 450 x10*3/uL   Basic Metabolic Panel   Result Value Ref Range    Glucose 62 (L) 74 - 99 mg/dL    Sodium 138 136 - 145 mmol/L    Potassium 3.7 3.5 - 5.3 mmol/L    Chloride 107 98 - 107 mmol/L    Bicarbonate 21 21 - 32 mmol/L    Anion Gap 14 10 - 20 mmol/L    Urea Nitrogen 28 (H) 6 - 23 mg/dL    Creatinine 1.20 (H) 0.50 - 1.05 mg/dL    eGFR 42 (L) >60 mL/min/1.73m*2    Calcium 8.8 8.6 - 10.3 mg/dL   POCT GLUCOSE   Result Value Ref Range    POCT Glucose 67 (L) 74 - 99 mg/dL   POCT GLUCOSE   Result Value Ref Range    POCT Glucose 89 74 - 99 mg/dL   POCT GLUCOSE   Result Value Ref Range    POCT Glucose 204 (H) 74 - 99 mg/dL                    Assessment/Plan   Assessment & Plan  Chest pain    Elevated lipase    Anxiety    Arteriosclerotic cardiovascular disease    Gastroparesis    Hypercholesterolemia    Non-insulin dependent type 2 diabetes mellitus (Multi)    Pancreas divisum    No MI   Stress test normal   blood pressure good   no abdominal pain   Amylase lipase normal  blood sugar okay  DC if okay with cardiologist       I spent  minutes in the professional and overall care of this patient.      Darya Peguero MD

## 2024-10-16 NOTE — CARE PLAN
The patient's goals for the shift include maintain comfort    The clinical goals for the shift include no chest pain for my shift

## 2024-10-16 NOTE — PROGRESS NOTES
Spiritual Care Visit    Clinical Encounter Type  Visited With: Patient  Routine Visit: Follow-up  Continue Visiting: Yes         Values/Beliefs  Spiritual Requests During Hospitalization: Catie said she was being discharged today. I gave her a blessing.     Jimmy Palencia

## 2024-10-16 NOTE — CARE PLAN
Problem: Skin  Goal: Prevent/manage excess moisture  Outcome: Progressing  Flowsheets (Taken 10/15/2024 2346)  Prevent/manage excess moisture: Moisturize dry skin  Goal: Prevent/minimize sheer/friction injuries  Outcome: Progressing  Goal: Promote/optimize nutrition  Outcome: Progressing  Flowsheets (Taken 10/15/2024 2346)  Promote/optimize nutrition: Consume > 50% meals/supplements     Problem: Pain - Adult  Goal: Verbalizes/displays adequate comfort level or baseline comfort level  Outcome: Progressing     Problem: Safety - Adult  Goal: Free from fall injury  Outcome: Progressing     Problem: Discharge Planning  Goal: Discharge to home or other facility with appropriate resources  Outcome: Progressing     Problem: Chronic Conditions and Co-morbidities  Goal: Patient's chronic conditions and co-morbidity symptoms are monitored and maintained or improved  Outcome: Progressing

## 2024-10-16 NOTE — RESEARCH NOTES
Artificial Intelligence Monitoring in Nursing (AIMS Nursing) Study    Principle Investigator - Dr. Trevon Tamayo  Research Coordinator - Yanira Hamm     Patient Name - Catie Groves  Date - 10/16/2024 12:50 PM  Location - [facility]    Catie Groves was approached by Yanira Hamm to talk about participating in the AIMS Nursing Study. The patient was not approached, they are expecting to be discharged today. Study protocol was followed and patient was given study contact information.     Yanira Hamm

## 2024-10-16 NOTE — DISCHARGE SUMMARY
Discharge Diagnosis  Chest pain    Issues Requiring Follow-Up  Hypertension    Test Results Pending At Discharge  Pending Labs       No current pending labs.            Hospital Course   Catie Groves is a 89 y.o. female presenting with chest pain.  Patient has history of coronary artery disease, anxiety, diabetes, depression, hyperlipidemia, hypertension, history of subarachnoid hemorrhage, history of gallstone pancreatitis, laparoscopic cholecystectomy, history of pancreas divisum, history of pulmonary embolism left lower lobe, history of gastroparesis.  early in the morning hours patient woke up with not feeling good had fluttering in the chest felt chest pain and then heaviness in the left arm took her blood pressure and it was extremely high.  Came to the emergency room and admitted for chest pain rule out   Stress test normal  Amylase lipase was elevated at admission but got better  Meds adjusted by cardiologist for elevated blood pressure    Pertinent Physical Exam At Time of Discharge  Physical Exam  Vitals reviewed.   Constitutional:       Appearance: Normal appearance.   HENT:      Head: Normocephalic and atraumatic.      Right Ear: Tympanic membrane, ear canal and external ear normal.      Left Ear: Tympanic membrane, ear canal and external ear normal.      Nose: Nose normal.      Mouth/Throat:      Pharynx: Oropharynx is clear.   Eyes:      Extraocular Movements: Extraocular movements intact.      Conjunctiva/sclera: Conjunctivae normal.      Pupils: Pupils are equal, round, and reactive to light.   Cardiovascular:      Rate and Rhythm: Normal rate and regular rhythm.      Pulses: Normal pulses.      Heart sounds: Normal heart sounds.   Pulmonary:      Effort: Pulmonary effort is normal.      Breath sounds: Normal breath sounds.   Abdominal:      General: Abdomen is flat. Bowel sounds are normal.      Palpations: Abdomen is soft.   Musculoskeletal:      Cervical back: Normal range of motion and neck  supple.   Skin:     General: Skin is warm and dry.   Neurological:      General: No focal deficit present.      Mental Status: She is alert and oriented to person, place, and time.   Psychiatric:         Mood and Affect: Mood normal.         Home Medications     Medication List      CHANGE how you take these medications     amLODIPine 10 mg tablet; Commonly known as: Norvasc; Take 1 tablet (10   mg) by mouth once daily.; Start taking on: October 17, 2024; What changed:   medication strength, how much to take   lidocaine HCL 4 % adhesive patch,medicated; Apply 1 patch topically   every 12 hours.; What changed: when to take this, reasons to take this   pantoprazole 40 mg EC tablet; Commonly known as: ProtoNix; Take 1 tablet   (40 mg) by mouth once daily in the morning. Take before meals. Do not   crush, chew, or split. Do not start before March 9, 2024.; What changed:   Another medication with the same name was removed. Continue taking this   medication, and follow the directions you see here.     CONTINUE taking these medications     acetaminophen 325 mg tablet; Commonly known as: Tylenol; Take 2 tablets   (650 mg) by mouth every 4 hours if needed for mild pain (1 - 3).   aspirin 81 mg chewable tablet; Chew 1 tablet (81 mg) once daily.   atorvastatin 40 mg tablet; Commonly known as: Lipitor; Take 1 tablet (40   mg) by mouth once daily.   BIOTIN ORAL   clopidogrel 75 mg tablet; Commonly known as: Plavix; Take 1 tablet (75   mg) by mouth once daily.   fluticasone 50 mcg/actuation nasal spray; Commonly known as: Flonase   gabapentin 300 mg capsule; Commonly known as: Neurontin; Take 1 capsule   (300 mg) by mouth 2 times a day.   glipiZIDE 5 mg tablet; Commonly known as: Glucotrol; Take 1 tablet (5   mg) by mouth once daily.   lancets misc; Commonly known as: Microlet Lancet; Use to test 2-3 daily.   * loratadine 10 mg tablet; Commonly known as: Claritin   * loratadine 10 mg tablet; Commonly known as: Claritin; Take 1  tablet   (10 mg) by mouth once daily.   losartan 100 mg tablet; Commonly known as: Cozaar; Take 1 tablet (100   mg) by mouth once daily.   melatonin 3 mg tablet; Take 1 tablet (3 mg) by mouth once daily at   bedtime.   metoprolol succinate  mg 24 hr tablet; Commonly known as:   Toprol-XL; Take 1 tablet (100 mg) by mouth 2 times a day.   mirtazapine 15 mg tablet; Commonly known as: Remeron; Take 1 tablet (15   mg) by mouth once daily at bedtime.   nitroglycerin 0.4 mg SL tablet; Commonly known as: Nitrostat   ondansetron ODT 4 mg disintegrating tablet; Commonly known as:   Zofran-ODT; Take 1 tablet (4 mg) by mouth every 8 hours if needed for   nausea.   PROBIOTIC ORAL   ursodiol 300 mg capsule; Commonly known as: Actigall; Take 1 capsule   (300 mg) by mouth 2 times a day.   VISION PLUS LUTEIN ORAL   Vitamin D3 50 mcg (2,000 unit) capsule; Generic drug: cholecalciferol  * This list has 2 medication(s) that are the same as other medications   prescribed for you. Read the directions carefully, and ask your doctor or   other care provider to review them with you.     STOP taking these medications     Contour Test Strips strip; Generic drug: blood sugar diagnostic   diclofenac sodium 1 % gel; Commonly known as: Voltaren   traMADol 50 mg tablet; Commonly known as: Ultram       Outpatient Follow-Up  Future Appointments   Date Time Provider Department Center   10/19/2024 10:00 AM Darya Peguero MD [id number] [facility]   10/22/2024  9:15 AM Kaden Elena OD [id number] [facility]   1/24/2025 10:00 AM Kasandra Huizar APRN-CNP [id number] [facility]       Darya Peguero MD

## 2024-10-17 DIAGNOSIS — E11.9 NON-INSULIN DEPENDENT TYPE 2 DIABETES MELLITUS (MULTI): Primary | ICD-10-CM

## 2024-10-19 ENCOUNTER — APPOINTMENT (OUTPATIENT)
Dept: PRIMARY CARE | Facility: CLINIC | Age: 89
End: 2024-10-19
Payer: MEDICARE

## 2024-10-19 ENCOUNTER — LAB (OUTPATIENT)
Dept: LAB | Facility: LAB | Age: 89
End: 2024-10-19
Payer: MEDICARE

## 2024-10-19 VITALS — HEIGHT: 64 IN | BODY MASS INDEX: 21.85 KG/M2 | WEIGHT: 128 LBS

## 2024-10-19 DIAGNOSIS — R11.0 NAUSEA: ICD-10-CM

## 2024-10-19 DIAGNOSIS — M25.561 ACUTE PAIN OF BOTH KNEES: ICD-10-CM

## 2024-10-19 DIAGNOSIS — I10 BENIGN ESSENTIAL HTN: ICD-10-CM

## 2024-10-19 DIAGNOSIS — E11.9 CONTROLLED TYPE 2 DIABETES MELLITUS WITHOUT COMPLICATION, WITHOUT LONG-TERM CURRENT USE OF INSULIN (MULTI): ICD-10-CM

## 2024-10-19 DIAGNOSIS — E78.5 HYPERLIPIDEMIA, UNSPECIFIED HYPERLIPIDEMIA TYPE: ICD-10-CM

## 2024-10-19 DIAGNOSIS — E11.9 TYPE 2 DIABETES MELLITUS WITHOUT COMPLICATION, WITHOUT LONG-TERM CURRENT USE OF INSULIN (MULTI): ICD-10-CM

## 2024-10-19 DIAGNOSIS — E78.5 DYSLIPIDEMIA: ICD-10-CM

## 2024-10-19 DIAGNOSIS — K80.50 CHOLEDOCHOLITHIASIS: ICD-10-CM

## 2024-10-19 DIAGNOSIS — R74.8 ELEVATED LIPASE: ICD-10-CM

## 2024-10-19 DIAGNOSIS — M25.562 ACUTE PAIN OF BOTH KNEES: ICD-10-CM

## 2024-10-19 DIAGNOSIS — Z13.29 THYROID DISORDER SCREEN: ICD-10-CM

## 2024-10-19 LAB
ANION GAP SERPL CALC-SCNC: 15 MMOL/L (ref 10–20)
BUN SERPL-MCNC: 34 MG/DL (ref 6–23)
CALCIUM SERPL-MCNC: 9.6 MG/DL (ref 8.6–10.6)
CHLORIDE SERPL-SCNC: 105 MMOL/L (ref 98–107)
CO2 SERPL-SCNC: 24 MMOL/L (ref 21–32)
CREAT SERPL-MCNC: 1.56 MG/DL (ref 0.5–1.05)
EGFRCR SERPLBLD CKD-EPI 2021: 30 ML/MIN/1.73M*2
GLUCOSE SERPL-MCNC: 144 MG/DL (ref 74–99)
POTASSIUM SERPL-SCNC: 3.8 MMOL/L (ref 3.5–5.3)
SODIUM SERPL-SCNC: 140 MMOL/L (ref 136–145)

## 2024-10-19 PROCEDURE — 36415 COLL VENOUS BLD VENIPUNCTURE: CPT

## 2024-10-19 PROCEDURE — 99214 OFFICE O/P EST MOD 30 MIN: CPT | Performed by: INTERNAL MEDICINE

## 2024-10-19 PROCEDURE — 1159F MED LIST DOCD IN RCRD: CPT | Performed by: INTERNAL MEDICINE

## 2024-10-19 PROCEDURE — 80048 BASIC METABOLIC PNL TOTAL CA: CPT

## 2024-10-19 PROCEDURE — 1036F TOBACCO NON-USER: CPT | Performed by: INTERNAL MEDICINE

## 2024-10-19 RX ORDER — GLIPIZIDE 5 MG/1
5 TABLET ORAL DAILY
Qty: 90 TABLET | Refills: 1 | Status: SHIPPED | OUTPATIENT
Start: 2024-10-19

## 2024-10-19 RX ORDER — ONDANSETRON 4 MG/1
4 TABLET, ORALLY DISINTEGRATING ORAL EVERY 8 HOURS PRN
Qty: 40 TABLET | Refills: 0 | Status: SHIPPED | OUTPATIENT
Start: 2024-10-19

## 2024-10-19 RX ORDER — GABAPENTIN 300 MG/1
300 CAPSULE ORAL 2 TIMES DAILY
Qty: 180 CAPSULE | Refills: 1 | Status: SHIPPED | OUTPATIENT
Start: 2024-10-19

## 2024-10-19 RX ORDER — PANTOPRAZOLE SODIUM 40 MG/1
40 TABLET, DELAYED RELEASE ORAL
Qty: 90 TABLET | Refills: 1 | Status: SHIPPED | OUTPATIENT
Start: 2024-10-19

## 2024-10-19 RX ORDER — URSODIOL 300 MG/1
300 CAPSULE ORAL 2 TIMES DAILY
Qty: 180 CAPSULE | Refills: 1 | Status: SHIPPED | OUTPATIENT
Start: 2024-10-19

## 2024-10-19 NOTE — PROGRESS NOTES
Subjective   Patient ID: Catie Groves is a 89 y.o. female who presents for Medicare Annual Wellness Visit Initial.    Patient is here for hospital follow-up.  Admitted for chest pain and shortness of breath and elevated blood pressure  Her stress test was negative.  Dose of Norvasc increased follow-up elevated blood pressure  Patient is just feeling little headache and he is little anxious  She does not have abdominal pain but she still feels a fullness in the abdomen  Follow-up blood work on diabetes cholesterol  Her knees were hurting in the hospital but they are doing better since she is moving around more    Past recap  Patient presents with complaints of having urinary burning and frequency and dark urine in the morning for last couple of days.  Denies any fever or chills  Denies any back pain, feeling tired    Patient is here with complaints of having shoulder pain back pain generalized achiness not feeling good.  Not sure if she is having anxiety or having something wrong     patient is here for follow-up  Follow-up on hypertension high cholesterol diabetes coronary artery disease  Needs refills  Last week right knee started hurting    Past recap   patient is here for hospital follow-up and rehab follow-up.  She was admitted for COVID and dehydration  She is still feeling tired and has no ambition  Home care is still active and she is getting therapy   patient is here because she continues to have pain and funny sensation in the left lower quadrant   Patient is wants her kidneys checked she is worried about the kidneys.  She is also having pain in the right knee having discomfort and having discomfort when walking  She got nerve block from Dr. Jacome for lumbar radiculopathy      Past recap  patient is here for routine follow-up  Her abdominal pain is doing better  She went to the GI and had colonoscopy done which was unremarkable.  Her pain went away by itself   Needs medication refill  Follow-up on  hypertension diabetes high cholesterol did blood work     patient is here still having abdominal bloating still having left lower quadrant discomfort and very worried that something is wrong   She is drinking enough water  If you are in the morning but during the day does not pee as much     patient is here because she is still having off-and-on abdominal pain and feels very distended not having good bowel movement in spite of taking lactulose     Patient is here with complaints of having abdominal pain.  Last Wednesday went to the ER urine grew Klebsiella pneumonia treated for UTI  2 weeks ago was very constipated stomach was hurting all over no still gets distended and not having good bowel movement  She is on gabapentin by Dr. quesada  Her back also hurts quite a bit      patient is here for follow-up  Follow-up on coronary artery disease hypertension high cholesterol anxiety  Did blood work  Needs medications  Patient got II nerve blocks back pain is doing little better but she has really suffered from the back pain.  Steroid only helped temporarily      Blood pressure  Needs medication refill  Shingles pain is doing better  Follow-up on coronary artery disease hypertension high cholesterol  Anxiety is doing better sometimes she takes tramadol  Wants nausea medication for occasional use  Wants handicap sticker  Shingles vaccine she is due  Overall doing well        Patient is complaining of having right upper quadrant pain and soreness having loose stools cholestyramine is not agreeing  She is worried that her GI issues are flaring up  She has history of common bile duct stones and irritable bowel disease  She also has history of diverticulitis C. difficile colitis     patient went to see the GI for the abdominal discomfort she was having she had MRI done CAT scans and ultrasound done everything came back normal  She did blood work for cholesterol diabetes  Here for follow-up and needs refills on  "medications  Overall she is doing great  Wants prescription for Zofran for occasional use      patient fell and broke her nose has a lot of bruises. On Wednesday she was walking in the parking lot to get groceries and she fell on her face  Now she is having pain in the right elbow she did not get x-ray of the elbow done concerned if she has broken the bone  She has sutures on the chin     Patient here for follow-up on hospital stay  Was admitted for TIA symptoms started on Plavix  Her blood pressure was very high last evening she is concerned if Plavix is making her blood pressure goal  She called me early morning hours around 5 spoke to her and after that she felt comfortable and slept  blood pressure is better now   She is also concerned about findings on the mastoid in MRI  Still feels sinuses to be congested       Objective   Ht 1.626 m (5' 4\")   Wt 58.1 kg (128 lb)   BMI 21.97 kg/m²     Physical Exam  Vitals reviewed.   Constitutional:       Appearance: Normal appearance.   HENT:      Head: Normocephalic and atraumatic.      Right Ear: Tympanic membrane, ear canal and external ear normal.      Left Ear: Tympanic membrane, ear canal and external ear normal.      Mouth/Throat:      Pharynx: Oropharynx is clear.   Eyes:      Extraocular Movements: Extraocular movements intact.      Conjunctiva/sclera: Conjunctivae normal.      Pupils: Pupils are equal, round, and reactive to light.   Cardiovascular:      Rate and Rhythm: Normal rate and regular rhythm.      Pulses: Normal pulses.      Heart sounds: Normal heart sounds.   Pulmonary:      Effort: Pulmonary effort is normal.      Breath sounds: Normal breath sounds.   Abdominal:      General: Abdomen is flat. Bowel sounds are normal.      Palpations: Abdomen is soft.   Musculoskeletal:      Cervical back: Normal range of motion and neck supple.   Skin:     General: Skin is warm and dry.   Neurological:      General: No focal deficit present.      Mental Status: She " is alert and oriented to person, place, and time.   Psychiatric:         Mood and Affect: Mood normal.       Assessment/Plan   Problem List Items Addressed This Visit          Gastrointestinal and Abdominal    Choledocholithiasis       Infectious Diseases    COVID-19       Musculoskeletal and Injuries    Knee pain     Other Visit Diagnoses       Controlled type 2 diabetes mellitus without complication, without long-term current use of insulin (Multi)              past recap  Patient symptoms could be related to gastroparesis  Patient quit taking Reglan  We we will try Reglan again  Try sucralfate to help with the acid reflux  She needs Plavix for her coronary artery disease and I do not think symptoms are caused by Plavix  Increase fiber in the diet  Follow-up if not better     10/21/22   Blood work results reviewed  Cholesterol well controlled  Sugar well controlled  Blood pressure stable  Patient had follow-up with a cardiologist coronary artery disease stable  Advised patient to take Metamucil to help with the loose stool  Explained that not safe to give standing order for antibiotics  Refer to physical therapy for lower back pain and hip pain  Medications refilled  Follow-up in 6 months     4/21/2023  Blood pressure stable  Creatinine has gone up to 1.69 BUN 42  Encourage patient to drink more water  Cut down salt intake if not better will do ultrasound kidneys and recheck blood work in 3 months  A1c has gone up to 7.6 patient has been really well controlled on diabetes all along  Steroids must be doing it  We will recheck and see if needs medication adjustment  Patient wants to wait until next blood work  Cholesterol is okay  Follow-up blood work in 3 months     7/31/2023  ER records reviewed  BUN 23 creatinine 1.5 potassium 5.1  Last blood work here showed elevated creatinine and potassium was high but at that time patient says she was getting nerve blocks which made her kidneys work well  We will check UA  C&S  Try lactulose for constipation  Medrol Dosepak for the back pain  Follow-up if not better     8/7/2023  Patient has distended abdomen  Stat CT of the abdomen pelvis done without contrast because of compromised kidney function  No acute pathology found  Advised patient to take lactulose twice a day or 3 times a day to see if it helps to improve and regularize her bowel movements  Follow-up if not better            8/24/2023  All blood work has been negative  CAT scan has been negative  We will do the ultrasound of the abdomen to make sure there is no urinary retention causing the symptoms  Refer to GI if ultrasound is negative    10/20/2023  Blood pressure stable  Cholesterol well controlled  Diabetes under control  Ursodiol given for bile duct/  Coronary artery disease stable patient to care of cardiologist  Medications refilled  Follow-up in 6 months      3/29/2024  Hospital records reviewed  Patient's blood sugars were running low  Also she was little anemic  Because of COVID probably she is still feeling tired needs to recover  Will check blood work again CBC CMP for B12 and iron  Advised to hold off diabetic medication if blood sugars are running low  Medications refilled  Home care orders done  Routine follow-up and follow-up as needed if not feeling better  Will call her with the results     7/19/2024  Patient does have arthritis in the knee  Lidoderm patch  Voltaren gel  Gabapentin to help with the pain  Refills given on ursodiol and glipizide  Blood work reviewed A1c and cholesterol is not done  Will do blood work in 3 months    8/23/2024  EKG done shows normal sinus rhythm chest x-ray done which is unremarkable  UA done which is unremarkable  Patient could be having symptoms from anxiety  Advised to take anxiety medication  Patient has a follow-up with the cardiologist  Advised to go to the emergency room if symptoms gets worse    9/10/2024  Urine analysis shows UTI  Will send it for culture  Keflex  for 10 days  Increase water intake  Follow-up if not better    10/19/2024  Hospital chart reviewed again  Stress test normal  Patient's blood pressure is fine  She is taking Norvasc 10 mg a day.  She was on it this dose in the past  She has follow-up with the cardiologist  She got the dye and her creatinine was slightly elevated  Will check her blood work  Diabetes well-controlled  Cholesterol well-controlled  Lipase was elevated  But it came down  Wondering if symptoms were related to her issues with sludge  Clinically patient is doing much better now  Will recheck in 3 months

## 2024-10-22 ENCOUNTER — APPOINTMENT (OUTPATIENT)
Dept: OPHTHALMOLOGY | Facility: CLINIC | Age: 89
End: 2024-10-22
Payer: MEDICARE

## 2024-10-22 DIAGNOSIS — Z96.1 PSEUDOPHAKIA: ICD-10-CM

## 2024-10-22 DIAGNOSIS — Z98.890: ICD-10-CM

## 2024-10-22 DIAGNOSIS — H35.3131 EARLY DRY STAGE NONEXUDATIVE AGE-RELATED MACULAR DEGENERATION OF BOTH EYES: Primary | ICD-10-CM

## 2024-10-22 DIAGNOSIS — H52.223 REGULAR ASTIGMATISM OF BOTH EYES: ICD-10-CM

## 2024-10-22 DIAGNOSIS — H53.002 AMBLYOPIA OF LEFT EYE: ICD-10-CM

## 2024-10-22 DIAGNOSIS — H04.123 DRY EYE SYNDROME OF LACRIMAL GLAND, BILATERAL: ICD-10-CM

## 2024-10-22 DIAGNOSIS — H52.13 BILATERAL MYOPIA: ICD-10-CM

## 2024-10-22 DIAGNOSIS — E11.9 TYPE 2 DIABETES MELLITUS WITHOUT RETINOPATHY (MULTI): ICD-10-CM

## 2024-10-22 PROBLEM — H53.9 ABNORMAL VISION: Status: RESOLVED | Noted: 2024-07-18 | Resolved: 2024-10-22

## 2024-10-22 PROBLEM — H26.9 UNSPECIFIED CATARACT: Status: RESOLVED | Noted: 2024-03-08 | Resolved: 2024-10-22

## 2024-10-22 PROBLEM — H26.40 SECONDARY CATARACT: Status: RESOLVED | Noted: 2024-07-18 | Resolved: 2024-10-22

## 2024-10-22 PROBLEM — H43.399 VITREOUS FLOATERS: Status: RESOLVED | Noted: 2023-04-21 | Resolved: 2024-10-22

## 2024-10-22 PROBLEM — Z86.69 HISTORY OF CATARACT: Status: RESOLVED | Noted: 2024-07-18 | Resolved: 2024-10-22

## 2024-10-22 PROBLEM — Z86.39 HISTORY OF TYPE 2 DIABETES MELLITUS: Status: RESOLVED | Noted: 2024-07-18 | Resolved: 2024-10-22

## 2024-10-22 PROCEDURE — 92134 CPTRZ OPH DX IMG PST SGM RTA: CPT | Performed by: STUDENT IN AN ORGANIZED HEALTH CARE EDUCATION/TRAINING PROGRAM

## 2024-10-22 PROCEDURE — 92012 INTRM OPH EXAM EST PATIENT: CPT | Performed by: STUDENT IN AN ORGANIZED HEALTH CARE EDUCATION/TRAINING PROGRAM

## 2024-10-22 ASSESSMENT — ENCOUNTER SYMPTOMS
CARDIOVASCULAR NEGATIVE: 0
PSYCHIATRIC NEGATIVE: 0
ENDOCRINE NEGATIVE: 1
HEMATOLOGIC/LYMPHATIC NEGATIVE: 0
ALLERGIC/IMMUNOLOGIC NEGATIVE: 0
MUSCULOSKELETAL NEGATIVE: 0
NEUROLOGICAL NEGATIVE: 0
GASTROINTESTINAL NEGATIVE: 0
RESPIRATORY NEGATIVE: 0
CONSTITUTIONAL NEGATIVE: 0
EYES NEGATIVE: 1

## 2024-10-22 ASSESSMENT — CONF VISUAL FIELD
OS_SUPERIOR_TEMPORAL_RESTRICTION: 0
OD_NORMAL: 1
OD_INFERIOR_NASAL_RESTRICTION: 0
OD_SUPERIOR_NASAL_RESTRICTION: 0
OD_SUPERIOR_TEMPORAL_RESTRICTION: 0
OS_INFERIOR_NASAL_RESTRICTION: 0
METHOD: COUNTING FINGERS
OS_INFERIOR_TEMPORAL_RESTRICTION: 0
OS_SUPERIOR_NASAL_RESTRICTION: 0
OS_NORMAL: 1
OD_INFERIOR_TEMPORAL_RESTRICTION: 0

## 2024-10-22 ASSESSMENT — REFRACTION_WEARINGRX
OD_SPHERE: -4.25
OS_ADD: +3.00
OS_SPHERE: -6.00
OD_ADD: +3.00
OD_CYLINDER: +1.50
OD_AXIS: 055
OS_AXIS: 136
OS_CYLINDER: +4.50

## 2024-10-22 ASSESSMENT — VISUAL ACUITY
METHOD: SNELLEN - LINEAR
CORRECTION_TYPE: GLASSES
OD_CC: 20/30
OS_CC: 20/50

## 2024-10-22 ASSESSMENT — SLIT LAMP EXAM - LIDS
COMMENTS: MGD UL/LL C SCALLOPED LID MARGIN
COMMENTS: MGD UL/LL C SCALLOPED LID MARGIN

## 2024-10-22 ASSESSMENT — TONOMETRY
IOP_METHOD: GOLDMANN APPLANATION
OS_IOP_MMHG: 14
OD_IOP_MMHG: 15

## 2024-10-22 ASSESSMENT — CUP TO DISC RATIO
OD_RATIO: .30
OS_RATIO: .30

## 2024-10-22 ASSESSMENT — REFRACTION_MANIFEST
OS_CYLINDER: +4.25
OD_ADD: +3.00
OD_SPHERE: -4.25
OD_CYLINDER: +1.25
OS_AXIS: 135
OS_ADD: +3.00
OD_AXIS: 055
OS_SPHERE: -5.00

## 2024-10-22 ASSESSMENT — EXTERNAL EXAM - LEFT EYE: OS_EXAM: DERMATOCHALASIS UL

## 2024-10-22 ASSESSMENT — EXTERNAL EXAM - RIGHT EYE: OD_EXAM: DERMATOCHALASIS UL

## 2024-10-22 NOTE — PROGRESS NOTES
Assessment/Plan   Diagnoses and all orders for this visit:  Type 2 diabetes mellitus without retinopathy (CMS/HCC)  -No retinopathy observed on exam today od/os, pt ed to continue good BGlc, blood pressure and lipid control, rtc with any changes in vision, otherwise monitor 1 year  Early dry stage nonexudative age-related macular degeneration of both eyes  -Stable findings today. Ed on continued healthy diet/exercise/no smoking/sunglasses/ and monitoring visual acuity (VA) with amsler and RTC immediately for any vision changes.  -OCT MAC stable today  Amblyopia of left eye  Bilateral myopia  Regular astigmatism of both eyes  Pseudophakia  Dry Eye Syndrome Bilateral Lacrimal Glands  BCVA improved today OD to 20/25. Hx of amblyopia OS-stable VA.    Reduction in BCVA due to ocular surface dryness; patient has started using Tears every other day with improvement in vision and ocular surface  Continue baseline treatments OTC Refresh or Systane TID, warm compresses 5 min/day, and Systane PM or Genteal kishan at bedtime.    RTC 6 months for annual with DFE, and Oct MAC

## 2024-11-08 ENCOUNTER — OFFICE VISIT (OUTPATIENT)
Dept: CARDIOLOGY | Facility: CLINIC | Age: 89
End: 2024-11-08
Payer: MEDICARE

## 2024-11-08 VITALS
SYSTOLIC BLOOD PRESSURE: 153 MMHG | HEART RATE: 65 BPM | HEIGHT: 64 IN | OXYGEN SATURATION: 92 % | WEIGHT: 131.2 LBS | DIASTOLIC BLOOD PRESSURE: 69 MMHG | BODY MASS INDEX: 22.4 KG/M2

## 2024-11-08 DIAGNOSIS — I10 BENIGN ESSENTIAL HTN: ICD-10-CM

## 2024-11-08 DIAGNOSIS — I25.10 ARTERIOSCLEROTIC CARDIOVASCULAR DISEASE: Primary | ICD-10-CM

## 2024-11-08 DIAGNOSIS — E78.00 HYPERCHOLESTEROLEMIA: ICD-10-CM

## 2024-11-08 PROCEDURE — 3077F SYST BP >= 140 MM HG: CPT | Performed by: NURSE PRACTITIONER

## 2024-11-08 PROCEDURE — 3078F DIAST BP <80 MM HG: CPT | Performed by: NURSE PRACTITIONER

## 2024-11-08 PROCEDURE — 99214 OFFICE O/P EST MOD 30 MIN: CPT | Performed by: NURSE PRACTITIONER

## 2024-11-08 PROCEDURE — 1036F TOBACCO NON-USER: CPT | Performed by: NURSE PRACTITIONER

## 2024-11-08 PROCEDURE — 1159F MED LIST DOCD IN RCRD: CPT | Performed by: NURSE PRACTITIONER

## 2024-11-08 PROCEDURE — 1126F AMNT PAIN NOTED NONE PRSNT: CPT | Performed by: NURSE PRACTITIONER

## 2024-11-08 PROCEDURE — 1160F RVW MEDS BY RX/DR IN RCRD: CPT | Performed by: NURSE PRACTITIONER

## 2024-11-08 ASSESSMENT — PAIN SCALES - GENERAL: PAINLEVEL_OUTOF10: 0-NO PAIN

## 2024-11-08 ASSESSMENT — COLUMBIA-SUICIDE SEVERITY RATING SCALE - C-SSRS
6. HAVE YOU EVER DONE ANYTHING, STARTED TO DO ANYTHING, OR PREPARED TO DO ANYTHING TO END YOUR LIFE?: NO
2. HAVE YOU ACTUALLY HAD ANY THOUGHTS OF KILLING YOURSELF?: NO
1. IN THE PAST MONTH, HAVE YOU WISHED YOU WERE DEAD OR WISHED YOU COULD GO TO SLEEP AND NOT WAKE UP?: NO

## 2024-11-08 ASSESSMENT — PATIENT HEALTH QUESTIONNAIRE - PHQ9
2. FEELING DOWN, DEPRESSED OR HOPELESS: NOT AT ALL
1. LITTLE INTEREST OR PLEASURE IN DOING THINGS: NOT AT ALL
SUM OF ALL RESPONSES TO PHQ9 QUESTIONS 1 AND 2: 0

## 2024-11-08 ASSESSMENT — ENCOUNTER SYMPTOMS
MUSCULOSKELETAL NEGATIVE: 1
NEUROLOGICAL NEGATIVE: 1
CARDIOVASCULAR NEGATIVE: 1
CONSTITUTIONAL NEGATIVE: 1
RESPIRATORY NEGATIVE: 1
GASTROINTESTINAL NEGATIVE: 1

## 2024-11-08 NOTE — PROGRESS NOTES
"Chief Complaint:   Follow-up (Here for hospital follow up.  Was recently hospitalized for high blood pressure. She has been feeling well since discharge.  Denies any chest discomfort.  )    History Of Present Illness:    .Ms Groves returns in follow up.  Denies chest pain, sob, palpitations or pedal edema.  Her bp at home is 150/90 prior to meds and  130-140 with lower diastolic after meds.         Last Recorded Vitals:  Blood pressure 153/69, pulse 65, height 1.626 m (5' 4\"), weight 59.5 kg (131 lb 3.2 oz), SpO2 92%.     Past Medical History:  Past Medical History:   Diagnosis Date    AMI (acute myocardial infarction) (Multi)     Anxiety     ASHD (arteriosclerotic heart disease)     COVID-19     Depression     Diabetes mellitus (Multi)     Gastroparesis     Gastroparesis    GERD (gastroesophageal reflux disease)     Hyperlipidemia     Hypertension     Neuropathy     Other conditions influencing health status     Acute Myocardial Infarction    Other conditions influencing health status     Pulmonary Embolism    Other conditions influencing health status     Total Occlusion Of The Femoral Artery    Other conditions influencing health status     Pulmonary Embolism    Palpitations     Peripheral vascular angioplasty status     Status post angioplasty    Personal history of other diseases of the circulatory system     History of hypertension    Personal history of other diseases of the circulatory system     History of peripheral vascular disease    Personal history of other diseases of the digestive system     History of esophageal reflux    Personal history of other diseases of the nervous system and sense organs     History of cataract    Personal history of other endocrine, nutritional and metabolic disease 01/27/2017    History of diabetic neuropathy    Personal history of other endocrine, nutritional and metabolic disease     History of type 2 diabetes mellitus    Personal history of other specified conditions " 06/17/2019    History of urinary frequency    Pneumonia     Pulmonary embolism     PVD (peripheral vascular disease) (CMS-HCC)     Sinusitis     Spinal stenosis     Tachycardia     Unspecified secondary cataract     Secondary cataract    Urinary tract infection         Past Surgical History:  Past Surgical History:   Procedure Laterality Date    CHOLECYSTECTOMY  04/01/2013    Cholecystectomy    CORONARY ANGIOPLASTY  04/01/2013    PTCA    MR HEAD ANGIO WO IV CONTRAST  10/15/2014    MR HEAD ANGIO WO IV CONTRAST [facility]    MR HEAD ANGIO WO IV CONTRAST  1/18/2020    MR HEAD ANGIO WO IV CONTRAST [facility]    MR NECK ANGIO WO IV CONTRAST  1/18/2020    MR NECK ANGIO WO IV CONTRAST [facility]    OTHER SURGICAL HISTORY  04/01/2013    Femoral Artery Exploration    OTHER SURGICAL HISTORY  04/01/2013    Endoscopic Retrograde Cholangiopancreatography (ERCP)    OTHER SURGICAL HISTORY  10/30/2018    Cataract Extraction With Insertion Of Intraocular Lens    OTHER SURGICAL HISTORY  10/30/2018    Iridotomy By YAG Laser    OTHER SURGICAL HISTORY  06/24/2020    YAG laser capsulotomy       Social History:  Social History     Socioeconomic History    Marital status:    Tobacco Use    Smoking status: Never     Passive exposure: Never    Smokeless tobacco: Never   Substance and Sexual Activity    Alcohol use: Never    Drug use: Never     Social Drivers of Health     Financial Resource Strain: Low Risk  (10/14/2024)    Overall Financial Resource Strain (CARDIA)     Difficulty of Paying Living Expenses: Not hard at all   Food Insecurity: No Food Insecurity (10/14/2024)    Hunger Vital Sign     Worried About Running Out of Food in the Last Year: Never true     Ran Out of Food in the Last Year: Never true   Transportation Needs: No Transportation Needs (10/14/2024)    PRAPARE - Transportation     Lack of Transportation (Medical): No     Lack of Transportation (Non-Medical): No   Physical Activity:  Inactive (10/15/2024)    Exercise Vital Sign     Days of Exercise per Week: 0 days     Minutes of Exercise per Session: 0 min   Stress: No Stress Concern Present (10/15/2024)    [employer] - Occupational Stress Questionnaire     Feeling of Stress : Not at all   Social Connections: Moderately Integrated (10/15/2024)    Social Connection and Isolation Panel [NHANES]     Frequency of Communication with Friends and Family: More than three times a week     Frequency of Social Gatherings with Friends and Family: More than three times a week     Attends [employer] Services: More than 4 times per year     Active Member of Clubs or Organizations: Yes     Attends Club or Organization Meetings: More than 4 times per year     Marital Status:    Intimate Partner Violence: Not At Risk (10/14/2024)    Humiliation, Afraid, Rape, and Kick questionnaire     Fear of Current or Ex-Partner: No     Emotionally Abused: No     Physically Abused: No     Sexually Abused: No   Housing Stability: Low Risk  (10/14/2024)    Housing Stability Vital Sign     Unable to Pay for Housing in the Last Year: No     Number of Times Moved in the Last Year: 0     Homeless in the Last Year: No       Family History:  Family History   Problem Relation Name Age of Onset    Anxiety disorder Mother      Heart disease Father      Diabetes Other           Allergies:  Amoxicillin-pot clavulanate and Levofloxacin    Outpatient Medications:  Current Outpatient Medications   Medication Sig Dispense Refill    acetaminophen (Tylenol) 325 mg tablet Take 2 tablets (650 mg) by mouth every 4 hours if needed for mild pain (1 - 3). 30 tablet 0    amLODIPine (Norvasc) 10 mg tablet Take 1 tablet (10 mg) by mouth once daily. 30 tablet 0    aspirin 81 mg chewable tablet Chew 1 tablet (81 mg) once daily.      atorvastatin (Lipitor) 40 mg tablet Take 1 tablet (40 mg) by mouth once daily. 90 tablet 3    BIOTIN ORAL Take 1 each by mouth once daily.       cholecalciferol (Vitamin D3) 50 mcg (2,000 unit) capsule Take 1 capsule (50 mcg) by mouth once daily.      clopidogrel (Plavix) 75 mg tablet Take 1 tablet (75 mg) by mouth once daily. 90 tablet 3    fluticasone (Flonase) 50 mcg/actuation nasal spray Administer 2 sprays into each nostril once daily.      gabapentin (Neurontin) 300 mg capsule Take 1 capsule (300 mg) by mouth 2 times a day. 180 capsule 1    glipiZIDE (Glucotrol) 5 mg tablet Take 1 tablet (5 mg) by mouth once daily. 90 tablet 1    Lactobacillus acidophilus (PROBIOTIC ORAL) Take 1 each by mouth once daily.      lancets (Microlet Lancet) misc Use to test 2-3 daily. 100 each 3    loratadine (Claritin) 10 mg tablet Take 1 tablet (10 mg) by mouth once daily.      losartan (Cozaar) 100 mg tablet Take 1 tablet (100 mg) by mouth once daily. 90 tablet 3    melatonin 3 mg tablet Take 1 tablet (3 mg) by mouth once daily at bedtime.      metoprolol succinate XL (Toprol-XL) 100 mg 24 hr tablet Take 1 tablet (100 mg) by mouth 2 times a day. 180 tablet 3    multivit with minerals/lutein (VISION PLUS LUTEIN ORAL) Take 1 each by mouth once daily. Take as directed      ondansetron ODT (Zofran-ODT) 4 mg disintegrating tablet Take 1 tablet (4 mg) by mouth every 8 hours if needed for nausea. 40 tablet 0    pantoprazole (ProtoNix) 40 mg EC tablet Take 1 tablet (40 mg) by mouth once daily in the morning. Take before meals. Do not crush, chew, or split. 90 tablet 1    ursodiol (Actigall) 300 mg capsule Take 1 capsule (300 mg) by mouth 2 times a day. 180 capsule 1    lidocaine HCL 4 % adhesive patch,medicated Apply 1 patch topically every 12 hours. (Patient not taking: Reported on 11/8/2024) 30 patch 0    loratadine (Claritin) 10 mg tablet Take 1 tablet (10 mg) by mouth once daily. 30 tablet 2    mirtazapine (Remeron) 15 mg tablet Take 1 tablet (15 mg) by mouth once daily at bedtime. (Patient not taking: Reported on 11/8/2024) 90 tablet 0    nitroglycerin (Nitrostat) 0.4 mg  SL tablet Place 1 tablet (0.4 mg) under the tongue every 5 minutes if needed for chest pain. (Patient not taking: Reported on 11/8/2024)       No current facility-administered medications for this visit.        Physical Exam:  Cardiovascular:      PMI at left midclavicular line. Normal rate. Regular rhythm. Normal S1. Normal S2.       Murmurs: There is no murmur.      No gallop.  No click. No rub.   Pulses:     Intact distal pulses.   Edema:     Peripheral edema absent.         ROS:  Review of Systems   Constitutional: Negative.   Cardiovascular: Negative.    Respiratory: Negative.     Skin: Negative.    Musculoskeletal: Negative.    Gastrointestinal: Negative.    Genitourinary: Negative.    Neurological: Negative.           Last Labs:  CBC -  Lab Results   Component Value Date    WBC 9.0 10/16/2024    HGB 11.9 (L) 10/16/2024    HCT 36.3 10/16/2024    MCV 95 10/16/2024     10/16/2024       CMP -  Lab Results   Component Value Date    CALCIUM 9.6 10/19/2024    PHOS 3.5 03/03/2024    PROT 6.6 10/14/2024    ALBUMIN 3.7 10/14/2024    AST 20 10/14/2024    ALT 10 10/14/2024    ALKPHOS 118 10/14/2024    BILITOT 0.4 10/14/2024       LIPID PANEL -   Lab Results   Component Value Date    CHOL 141 10/15/2024    TRIG 109 10/15/2024    HDL 52.8 10/15/2024    CHHDL 2.7 10/15/2024    LDLF 70 06/28/2023    VLDL 22 10/15/2024    NHDL 88 10/15/2024       RENAL FUNCTION PANEL -   Lab Results   Component Value Date    GLUCOSE 144 (H) 10/19/2024     10/19/2024    K 3.8 10/19/2024     10/19/2024    CO2 24 10/19/2024    ANIONGAP 15 10/19/2024    BUN 34 (H) 10/19/2024    CREATININE 1.56 (H) 10/19/2024    CALCIUM 9.6 10/19/2024    PHOS 3.5 03/03/2024    ALBUMIN 3.7 10/14/2024        Lab Results   Component Value Date     (H) 10/14/2024    HGBA1C 6.4 (H) 10/14/2023         Assessment/Plan   Problem List Items Addressed This Visit    None    1 CAD with anteroseptal MI 01/19/1995.     2. Status post PCI to high-grade  mid LAD stenosis 04/19/1995. Patient is without any anginal symptoms. She did have an IV pharmacological nuclear stress test on 04/19/2013. That was negative for evidence of ischemia. The patient did have a repeat cardiac catheter performed parenthetically in 12/ 2008, with results as noted. More recently the patient was admitted to [facility] on 6/6/2014 with recurrent diverticulitis along with some intermittent chest tightness/discomfort. As such she did have a pharmacological nuclear stress test performed on 6/9/2014 which was negative for evidence of ischemia. Patient was admitted to [facility] on 11/3/2014 for chest pain and hypertension. She has not had any recurrent chest pain. ECG done prior shows NSR. Patient had negative lexiscan done 10/2024.     3. Status post a right groin exploration and repair of right femoral artery occlusion following cardiac cath 12/2008.     4. Hyperlipidemia. The patient will continue atorvastatin 40 mg daily. Recent lipid panel from 12/9/2020 includes cholesterol 158 LDL 77 HDL 52 triglyceride 143. The SMA panel was normal except creatinine 1.37. Glycohemoglobin was 6.1%.     5. Hypertension. The patient's blood pressure is slightly elevated today. The patient has a considerable degree of anxiety with respect to her blood pressure. Will continue amlodipine 10 mg daily, losartan 100 mg daily and metoprolol  mg daily unchanged for now.  Blood pressure is acceptable at present current dosages of the amlodipine and losartan with the metoprolol ER having been increased to 100 mg twice daily during recent admission in 3/2024 for COVID-19 at which time she was somewhat tachycardic.  The systolic blood pressure is actually in the lower range of normal and will reduce the dose of amlodipine from 10 to 5 mg daily.     6. Type 2 diabetes. Diabetic control has been satisfactory. Her recent glycohemoglobin was 6.1% on 12/9/2020.  Patient remains on usual glipizide 5 mg  daily.  She did receive insulin temporarily when hospitalized.     7. Laparoscopic cholecystectomy with multiple postoperative ERCPs 7/2006.     8. History of pancreas divisum.     9. Hyperplastic colonic polyps.     10. Lumbosacral spinal DJD with lumbar radiculopathy.     11. History of DJD.     12. History of Gamble's neuroma, left foot.     13. History of pulmonary embolism, left lower lobe.     14, former Coumadin anticoagulation.     15. Gastroparesis.     16. GERD.     17. History of splenic artery aneurysm. The patient had a repeat CT angiogram on 10/29/2013 which demonstrated that the splenic artery aneurysm was unchanged in size.     18. History of reactive depression.     19. History of syncope with head trauma and tiny intracranial subarachnoid hemorrhage. Please see previous office notes for review of the day of 5/31. The patient had gotten up from the couch to use the bathroom when she became woozy lightheaded and fell, striking her head. She was taken [facility]. Head CT showed a left parietal scalp hematoma, along with a small 2 x 7 mm focus of increased attenuation within the left frontal sulcus consistent with a small amount of subarachnoid hemorrhage or petechial hemorrhage of adjacent cortex with no mass effect. The patient was transferred to [facility] observed for 2 days. Repeat CT scanning was done during that admission. It was thought that she might have had orthostatic hypotension as the cause of her fall and as noted. Her previously prescribed imdur was stopped. Patient has not had any recurrent syncopal episodes.      20. Status post partial colectomy for diverticulitis 6/16/2014 with postoperative C. difficile colitis. This patient was admitted to [facility] on 6/6/2014 with acute diverticulitis. She ultimately underwent a partial colectomy on 6/16. She was hospitalized for a period of one week afterwards and then sent to [facility] for 2 weeks  rehabilitation. She was then readmitted to [facility] because of C. difficile colitis and was hospitalized for another 2 weeks. She was sent to [place] for rehabilitation for one week.      21. Hernia repair 2/2015 with Dr. Rodriguez at [facility].      22. Carotid vascular disease.     23. Palpitations. Hospital stay 09/2018 at [facility]. Was on metoprolol succinate 200 mg, but has decreased it to 100 mg without return of symptoms.      24. TIA. Had hospital stay at [facility] 01/17/2020 for suspected TIA. Had vision changes and buzzing in the head which have almost completely resolved. CT of brain was negative. MRI of brain was negative, but showed mastoiditis. She will schedule to see ENT. Will continue plavix. Is off asa.      25. Hx of covid-19 vaccine #1 and #2.  The patient was admitted to [facility] on 3/3/2024 - 3/8/2024 with COVID-19.  During that admission she was noted to have a sinus tachycardia.  Echocardiogram demonstrated an LV ejection fraction 60% 1-2+ mitral valve regurgitation 1+ tricuspid valve regurgitation aortic valve sclerosis.  Her metoprolol was increased to 100 mg twice daily.            Kasandra Huizar, APRN-CNP

## 2024-12-06 DIAGNOSIS — E11.9 TYPE 2 DIABETES MELLITUS WITHOUT COMPLICATION, WITHOUT LONG-TERM CURRENT USE OF INSULIN (MULTI): ICD-10-CM

## 2024-12-08 RX ORDER — BLOOD SUGAR DIAGNOSTIC
STRIP MISCELLANEOUS
Qty: 200 STRIP | Refills: 0 | Status: SHIPPED | OUTPATIENT
Start: 2024-12-08

## 2024-12-08 RX ORDER — LANCETS
EACH MISCELLANEOUS
Qty: 100 EACH | Refills: 3 | Status: SHIPPED | OUTPATIENT
Start: 2024-12-08

## 2024-12-12 RX ORDER — IBUPROFEN 200 MG
CAPSULE ORAL
Qty: 200 STRIP | Refills: 1 | Status: SHIPPED | OUTPATIENT
Start: 2024-12-12

## 2025-01-18 ENCOUNTER — LAB (OUTPATIENT)
Dept: LAB | Facility: LAB | Age: OVER 89
End: 2025-01-18
Payer: MEDICARE

## 2025-01-18 DIAGNOSIS — Z13.29 THYROID DISORDER SCREEN: ICD-10-CM

## 2025-01-18 DIAGNOSIS — K80.50 CHOLEDOCHOLITHIASIS: ICD-10-CM

## 2025-01-18 DIAGNOSIS — I10 BENIGN ESSENTIAL HTN: ICD-10-CM

## 2025-01-18 DIAGNOSIS — E11.9 TYPE 2 DIABETES MELLITUS WITHOUT COMPLICATION, WITHOUT LONG-TERM CURRENT USE OF INSULIN (MULTI): ICD-10-CM

## 2025-01-18 DIAGNOSIS — R74.8 ELEVATED LIPASE: ICD-10-CM

## 2025-01-18 DIAGNOSIS — E78.5 HYPERLIPIDEMIA, UNSPECIFIED HYPERLIPIDEMIA TYPE: ICD-10-CM

## 2025-01-18 DIAGNOSIS — E78.5 DYSLIPIDEMIA: ICD-10-CM

## 2025-01-18 DIAGNOSIS — E11.9 CONTROLLED TYPE 2 DIABETES MELLITUS WITHOUT COMPLICATION, WITHOUT LONG-TERM CURRENT USE OF INSULIN (MULTI): ICD-10-CM

## 2025-01-18 LAB
ALBUMIN SERPL BCP-MCNC: 4.2 G/DL (ref 3.4–5)
ALP SERPL-CCNC: 162 U/L (ref 33–136)
ALT SERPL W P-5'-P-CCNC: 12 U/L (ref 7–45)
ANION GAP SERPL CALCULATED.3IONS-SCNC: 12 MMOL/L (ref 10–20)
AST SERPL W P-5'-P-CCNC: 22 U/L (ref 9–39)
BILIRUB SERPL-MCNC: 0.5 MG/DL (ref 0–1.2)
BUN SERPL-MCNC: 31 MG/DL (ref 6–23)
CALCIUM SERPL-MCNC: 9.4 MG/DL (ref 8.6–10.3)
CHLORIDE SERPL-SCNC: 108 MMOL/L (ref 98–107)
CHOLEST SERPL-MCNC: 145 MG/DL (ref 0–199)
CHOLEST/HDLC SERPL: 2.6 {RATIO}
CO2 SERPL-SCNC: 23 MMOL/L (ref 21–32)
CREAT SERPL-MCNC: 1.26 MG/DL (ref 0.5–1.05)
EGFRCR SERPLBLD CKD-EPI 2021: 39 ML/MIN/1.73M*2
ERYTHROCYTE [DISTWIDTH] IN BLOOD BY AUTOMATED COUNT: 12.4 % (ref 11.5–14.5)
GLUCOSE SERPL-MCNC: 114 MG/DL (ref 74–99)
HCT VFR BLD AUTO: 39.2 % (ref 36–46)
HDLC SERPL-MCNC: 56.3 MG/DL
HGB BLD-MCNC: 12.6 G/DL (ref 12–16)
LDLC SERPL CALC-MCNC: 63 MG/DL
LIPASE SERPL-CCNC: 91 U/L (ref 9–82)
MCH RBC QN AUTO: 31 PG (ref 26–34)
MCHC RBC AUTO-ENTMCNC: 32.1 G/DL (ref 32–36)
MCV RBC AUTO: 96 FL (ref 80–100)
NON HDL CHOLESTEROL: 89 MG/DL (ref 0–149)
NRBC BLD-RTO: 0 /100 WBCS (ref 0–0)
PLATELET # BLD AUTO: 241 X10*3/UL (ref 150–450)
POTASSIUM SERPL-SCNC: 4.9 MMOL/L (ref 3.5–5.3)
PROT SERPL-MCNC: 6.7 G/DL (ref 6.4–8.2)
RBC # BLD AUTO: 4.07 X10*6/UL (ref 4–5.2)
SODIUM SERPL-SCNC: 138 MMOL/L (ref 136–145)
TRIGL SERPL-MCNC: 128 MG/DL (ref 0–149)
TSH SERPL-ACNC: 2.54 MIU/L (ref 0.44–3.98)
VLDL: 26 MG/DL (ref 0–40)
WBC # BLD AUTO: 9.2 X10*3/UL (ref 4.4–11.3)

## 2025-01-18 PROCEDURE — 85027 COMPLETE CBC AUTOMATED: CPT

## 2025-01-18 PROCEDURE — 83690 ASSAY OF LIPASE: CPT

## 2025-01-18 PROCEDURE — 83036 HEMOGLOBIN GLYCOSYLATED A1C: CPT

## 2025-01-18 PROCEDURE — 80053 COMPREHEN METABOLIC PANEL: CPT

## 2025-01-18 PROCEDURE — 80061 LIPID PANEL: CPT

## 2025-01-18 PROCEDURE — 84443 ASSAY THYROID STIM HORMONE: CPT

## 2025-01-18 PROCEDURE — 82306 VITAMIN D 25 HYDROXY: CPT

## 2025-01-19 LAB
25(OH)D3 SERPL-MCNC: 34 NG/ML (ref 30–100)
EST. AVERAGE GLUCOSE BLD GHB EST-MCNC: 131 MG/DL
HBA1C MFR BLD: 6.2 %

## 2025-01-24 ENCOUNTER — APPOINTMENT (OUTPATIENT)
Dept: PRIMARY CARE | Facility: CLINIC | Age: OVER 89
End: 2025-01-24
Payer: MEDICARE

## 2025-01-24 ENCOUNTER — OFFICE VISIT (OUTPATIENT)
Dept: CARDIOLOGY | Facility: CLINIC | Age: OVER 89
End: 2025-01-24
Payer: MEDICARE

## 2025-01-24 VITALS
BODY MASS INDEX: 24.66 KG/M2 | SYSTOLIC BLOOD PRESSURE: 136 MMHG | WEIGHT: 134 LBS | HEIGHT: 62 IN | DIASTOLIC BLOOD PRESSURE: 78 MMHG

## 2025-01-24 VITALS
HEIGHT: 64 IN | HEART RATE: 66 BPM | SYSTOLIC BLOOD PRESSURE: 162 MMHG | OXYGEN SATURATION: 97 % | BODY MASS INDEX: 22.93 KG/M2 | WEIGHT: 134.3 LBS | DIASTOLIC BLOOD PRESSURE: 74 MMHG

## 2025-01-24 DIAGNOSIS — E11.9 CONTROLLED TYPE 2 DIABETES MELLITUS WITHOUT COMPLICATION, WITHOUT LONG-TERM CURRENT USE OF INSULIN (MULTI): ICD-10-CM

## 2025-01-24 DIAGNOSIS — I10 BENIGN ESSENTIAL HTN: ICD-10-CM

## 2025-01-24 DIAGNOSIS — E78.5 DYSLIPIDEMIA: ICD-10-CM

## 2025-01-24 DIAGNOSIS — R00.2 PALPITATIONS: Primary | ICD-10-CM

## 2025-01-24 DIAGNOSIS — F41.9 ANXIETY: ICD-10-CM

## 2025-01-24 PROCEDURE — 1036F TOBACCO NON-USER: CPT | Performed by: INTERNAL MEDICINE

## 2025-01-24 PROCEDURE — 3075F SYST BP GE 130 - 139MM HG: CPT | Performed by: INTERNAL MEDICINE

## 2025-01-24 PROCEDURE — 1160F RVW MEDS BY RX/DR IN RCRD: CPT | Performed by: NURSE PRACTITIONER

## 2025-01-24 PROCEDURE — 3077F SYST BP >= 140 MM HG: CPT | Performed by: NURSE PRACTITIONER

## 2025-01-24 PROCEDURE — 1126F AMNT PAIN NOTED NONE PRSNT: CPT | Performed by: NURSE PRACTITIONER

## 2025-01-24 PROCEDURE — 1036F TOBACCO NON-USER: CPT | Performed by: NURSE PRACTITIONER

## 2025-01-24 PROCEDURE — G2211 COMPLEX E/M VISIT ADD ON: HCPCS | Performed by: NURSE PRACTITIONER

## 2025-01-24 PROCEDURE — 1124F ACP DISCUSS-NO DSCNMKR DOCD: CPT | Performed by: INTERNAL MEDICINE

## 2025-01-24 PROCEDURE — 99214 OFFICE O/P EST MOD 30 MIN: CPT | Performed by: NURSE PRACTITIONER

## 2025-01-24 PROCEDURE — 1159F MED LIST DOCD IN RCRD: CPT | Performed by: NURSE PRACTITIONER

## 2025-01-24 PROCEDURE — 99214 OFFICE O/P EST MOD 30 MIN: CPT | Performed by: INTERNAL MEDICINE

## 2025-01-24 PROCEDURE — 3078F DIAST BP <80 MM HG: CPT | Performed by: NURSE PRACTITIONER

## 2025-01-24 PROCEDURE — 3078F DIAST BP <80 MM HG: CPT | Performed by: INTERNAL MEDICINE

## 2025-01-24 RX ORDER — AMLODIPINE BESYLATE 10 MG/1
10 TABLET ORAL DAILY
Qty: 90 TABLET | Refills: 3 | Status: SHIPPED | OUTPATIENT
Start: 2025-01-24 | End: 2026-01-24

## 2025-01-24 RX ORDER — AMLODIPINE BESYLATE 10 MG/1
10 TABLET ORAL DAILY
Qty: 30 TABLET | Refills: 0 | Status: SHIPPED
Start: 2025-01-24 | End: 2025-01-24

## 2025-01-24 RX ORDER — MIRTAZAPINE 15 MG/1
15 TABLET, FILM COATED ORAL NIGHTLY
Qty: 90 TABLET | Refills: 0 | Status: SHIPPED | OUTPATIENT
Start: 2025-01-24

## 2025-01-24 ASSESSMENT — ENCOUNTER SYMPTOMS
MUSCULOSKELETAL NEGATIVE: 1
NEUROLOGICAL NEGATIVE: 1
CARDIOVASCULAR NEGATIVE: 1
RESPIRATORY NEGATIVE: 1
GASTROINTESTINAL NEGATIVE: 1
CONSTITUTIONAL NEGATIVE: 1

## 2025-01-24 ASSESSMENT — PAIN SCALES - GENERAL: PAINLEVEL_OUTOF10: 0-NO PAIN

## 2025-01-24 NOTE — PROGRESS NOTES
"Chief Complaint:   Follow-up    History Of Present Illness:    .Ms Groves returns in follow up.  Denies chest pain, sob, palpitations or pedal edema.  Her bp at home is 150/90 prior to meds and  130-140 with lower diastolic after meds.              Last Recorded Vitals:  Blood pressure 162/74, pulse 66, height 1.626 m (5' 4\"), weight 60.9 kg (134 lb 4.8 oz), SpO2 97%.     Past Medical History:  Past Medical History:   Diagnosis Date    AMI (acute myocardial infarction) (Multi)     Anxiety     ASHD (arteriosclerotic heart disease)     COVID-19     Depression     Diabetes mellitus (Multi)     Gastroparesis     Gastroparesis    GERD (gastroesophageal reflux disease)     Hyperlipidemia     Hypertension     Neuropathy     Other conditions influencing health status     Acute Myocardial Infarction    Other conditions influencing health status     Pulmonary Embolism    Other conditions influencing health status     Total Occlusion Of The Femoral Artery    Other conditions influencing health status     Pulmonary Embolism    Palpitations     Peripheral vascular angioplasty status     Status post angioplasty    Personal history of other diseases of the circulatory system     History of hypertension    Personal history of other diseases of the circulatory system     History of peripheral vascular disease    Personal history of other diseases of the digestive system     History of esophageal reflux    Personal history of other diseases of the nervous system and sense organs     History of cataract    Personal history of other endocrine, nutritional and metabolic disease 01/27/2017    History of diabetic neuropathy    Personal history of other endocrine, nutritional and metabolic disease     History of type 2 diabetes mellitus    Personal history of other specified conditions 06/17/2019    History of urinary frequency    Pneumonia     Pulmonary embolism     PVD (peripheral vascular disease) (CMS-[facility])     Sinusitis     Spinal " stenosis     Tachycardia     Unspecified secondary cataract     Secondary cataract    Urinary tract infection         Past Surgical History:  Past Surgical History:   Procedure Laterality Date    CHOLECYSTECTOMY  04/01/2013    Cholecystectomy    CORONARY ANGIOPLASTY  04/01/2013    PTCA    MR HEAD ANGIO WO IV CONTRAST  10/15/2014    MR HEAD ANGIO WO IV CONTRAST [facility]    MR HEAD ANGIO WO IV CONTRAST  1/18/2020    MR HEAD ANGIO WO IV CONTRAST [facility]    MR NECK ANGIO WO IV CONTRAST  1/18/2020    MR NECK ANGIO WO IV CONTRAST [facility]    OTHER SURGICAL HISTORY  04/01/2013    Femoral Artery Exploration    OTHER SURGICAL HISTORY  04/01/2013    Endoscopic Retrograde Cholangiopancreatography (ERCP)    OTHER SURGICAL HISTORY  10/30/2018    Cataract Extraction With Insertion Of Intraocular Lens    OTHER SURGICAL HISTORY  10/30/2018    Iridotomy By YAG Laser    OTHER SURGICAL HISTORY  06/24/2020    YAG laser capsulotomy       Social History:  Social History     Socioeconomic History    Marital status:    Tobacco Use    Smoking status: Never     Passive exposure: Never    Smokeless tobacco: Never   Substance and Sexual Activity    Alcohol use: Never    Drug use: Never     Social Drivers of Health     Financial Resource Strain: Low Risk  (10/14/2024)    Overall Financial Resource Strain (CARDIA)     Difficulty of Paying Living Expenses: Not hard at all   Food Insecurity: No Food Insecurity (10/14/2024)    Hunger Vital Sign     Worried About Running Out of Food in the Last Year: Never true     Ran Out of Food in the Last Year: Never true   Transportation Needs: No Transportation Needs (10/14/2024)    PRAPARE - Transportation     Lack of Transportation (Medical): No     Lack of Transportation (Non-Medical): No   Physical Activity: Inactive (10/15/2024)    Exercise Vital Sign     Days of Exercise per Week: 0 days     Minutes of Exercise per Session: 0 min   Stress: No Stress Concern  Present (10/15/2024)    [facility] - Occupational Stress Questionnaire     Feeling of Stress : Not at all   Social Connections: Moderately Integrated (10/15/2024)    Social Connection and Isolation Panel [NHANES]     Frequency of Communication with Friends and Family: More than three times a week     Frequency of Social Gatherings with Friends and Family: More than three times a week     Attends [employer] Services: More than 4 times per year     Active Member of Clubs or Organizations: Yes     Attends Club or Organization Meetings: More than 4 times per year     Marital Status:    Intimate Partner Violence: Not At Risk (10/14/2024)    Humiliation, Afraid, Rape, and Kick questionnaire     Fear of Current or Ex-Partner: No     Emotionally Abused: No     Physically Abused: No     Sexually Abused: No   Housing Stability: Low Risk  (10/14/2024)    Housing Stability Vital Sign     Unable to Pay for Housing in the Last Year: No     Number of Times Moved in the Last Year: 0     Homeless in the Last Year: No       Family History:  Family History   Problem Relation Name Age of Onset    Anxiety disorder Mother      Heart disease Father      Diabetes Other           Allergies:  Amoxicillin-pot clavulanate and Levofloxacin    Outpatient Medications:  Current Outpatient Medications   Medication Sig Dispense Refill    acetaminophen (Tylenol) 325 mg tablet Take 2 tablets (650 mg) by mouth every 4 hours if needed for mild pain (1 - 3). 30 tablet 0    amLODIPine (Norvasc) 10 mg tablet Take 1 tablet (10 mg) by mouth once daily. 30 tablet 0    aspirin 81 mg chewable tablet Chew 1 tablet (81 mg) once daily.      atorvastatin (Lipitor) 40 mg tablet Take 1 tablet (40 mg) by mouth once daily. 90 tablet 3    BIOTIN ORAL Take 1 each by mouth once daily.      blood sugar diagnostic (Blood Glucose Test) strip Test 2-3 time daily for diabetes (E11.9), non-insulin dependent, fill for contour test strips, last  A1c 6.4 200 strip 1    blood sugar diagnostic (Contour Next Test Strips) strip Test 2-3 times daily 200 strip 0    cholecalciferol (Vitamin D3) 50 mcg (2,000 unit) capsule Take 1 capsule (50 mcg) by mouth once daily.      clopidogrel (Plavix) 75 mg tablet Take 1 tablet (75 mg) by mouth once daily. 90 tablet 3    fluticasone (Flonase) 50 mcg/actuation nasal spray Administer 2 sprays into each nostril once daily.      gabapentin (Neurontin) 300 mg capsule Take 1 capsule (300 mg) by mouth 2 times a day. 180 capsule 1    glipiZIDE (Glucotrol) 5 mg tablet Take 1 tablet (5 mg) by mouth once daily. 90 tablet 1    Lactobacillus acidophilus (PROBIOTIC ORAL) Take 1 each by mouth once daily.      lancets (Microlet Lancet) misc Use to test 2-3 daily. 100 each 3    losartan (Cozaar) 100 mg tablet Take 1 tablet (100 mg) by mouth once daily. 90 tablet 3    melatonin 3 mg tablet Take 1 tablet (3 mg) by mouth once daily at bedtime.      metoprolol succinate XL (Toprol-XL) 100 mg 24 hr tablet Take 1 tablet (100 mg) by mouth 2 times a day. 180 tablet 3    multivit with minerals/lutein (VISION PLUS LUTEIN ORAL) Take 1 each by mouth once daily. Take as directed      ondansetron ODT (Zofran-ODT) 4 mg disintegrating tablet Take 1 tablet (4 mg) by mouth every 8 hours if needed for nausea. 40 tablet 0    pantoprazole (ProtoNix) 40 mg EC tablet Take 1 tablet (40 mg) by mouth once daily in the morning. Take before meals. Do not crush, chew, or split. 90 tablet 1    ursodiol (Actigall) 300 mg capsule Take 1 capsule (300 mg) by mouth 2 times a day. 180 capsule 1    loratadine (Claritin) 10 mg tablet Take 1 tablet (10 mg) by mouth once daily. 30 tablet 2     No current facility-administered medications for this visit.        Physical Exam:  Cardiovascular:      PMI at left midclavicular line. Normal rate. Regular rhythm. Normal S1. Normal S2.       Murmurs: There is no murmur.      No gallop.  No click. No rub.   Pulses:     Intact distal  pulses.   Edema:     Peripheral edema absent.         ROS:  Review of Systems   Constitutional: Negative.   Cardiovascular: Negative.    Respiratory: Negative.     Skin: Negative.    Musculoskeletal: Negative.    Gastrointestinal: Negative.    Genitourinary: Negative.    Neurological: Negative.           Last Labs:  CBC -  Lab Results   Component Value Date    WBC 9.2 01/18/2025    HGB 12.6 01/18/2025    HCT 39.2 01/18/2025    MCV 96 01/18/2025     01/18/2025       CMP -  Lab Results   Component Value Date    CALCIUM 9.4 01/18/2025    PHOS 3.5 03/03/2024    PROT 6.7 01/18/2025    ALBUMIN 4.2 01/18/2025    AST 22 01/18/2025    ALT 12 01/18/2025    ALKPHOS 162 (H) 01/18/2025    BILITOT 0.5 01/18/2025       LIPID PANEL -   Lab Results   Component Value Date    CHOL 145 01/18/2025    TRIG 128 01/18/2025    HDL 56.3 01/18/2025    CHHDL 2.6 01/18/2025    LDLF 70 06/28/2023    VLDL 26 01/18/2025    NHDL 89 01/18/2025       RENAL FUNCTION PANEL -   Lab Results   Component Value Date    GLUCOSE 114 (H) 01/18/2025     01/18/2025    K 4.9 01/18/2025     (H) 01/18/2025    CO2 23 01/18/2025    ANIONGAP 12 01/18/2025    BUN 31 (H) 01/18/2025    CREATININE 1.26 (H) 01/18/2025    CALCIUM 9.4 01/18/2025    PHOS 3.5 03/03/2024    ALBUMIN 4.2 01/18/2025        Lab Results   Component Value Date     (H) 10/14/2024    HGBA1C 6.2 (H) 01/18/2025         Assessment/Plan   Problem List Items Addressed This Visit    None      1 CAD with anteroseptal MI 01/19/1995.     2. Status post PCI to high-grade mid LAD stenosis 04/19/1995. Patient is without any anginal symptoms. She did have an IV pharmacological nuclear stress test on 04/19/2013. That was negative for evidence of ischemia. The patient did have a repeat cardiac catheter performed parenthetically in 12/ 2008, with results as noted. More recently the patient was admitted to [facility] on 6/6/2014 with recurrent diverticulitis along with some  intermittent chest tightness/discomfort. As such she did have a pharmacological nuclear stress test performed on 6/9/2014 which was negative for evidence of ischemia. Patient was admitted to [facility] on 11/3/2014 for chest pain and hypertension. She has not had any recurrent chest pain. ECG done prior shows NSR. Patient had negative lexiscan done 10/2024.     3. Status post a right groin exploration and repair of right femoral artery occlusion following cardiac cath 12/2008.     4. Hyperlipidemia. The patient will continue atorvastatin 40 mg daily. Recent lipid panel from 12/9/2020 includes cholesterol 158 LDL 77 HDL 52 triglyceride 143. The SMA panel was normal except creatinine 1.37. Glycohemoglobin was 6.1%.     5. Hypertension. The patient's blood pressure is slightly elevated today. The patient has a considerable degree of anxiety with respect to her blood pressure. Will continue amlodipine 10 mg daily, losartan 100 mg daily and metoprolol  mg daily unchanged for now.  Blood pressure is acceptable at present current dosages of the amlodipine and losartan with the metoprolol ER having been increased to 100 mg twice daily during recent admission in 3/2024 for COVID-19 at which time she was somewhat tachycardic.  The systolic blood pressure is actually in the lower range of normal and will reduce the dose of amlodipine from 10 to 5 mg daily.     6. Type 2 diabetes. Diabetic control has been satisfactory. Her recent glycohemoglobin was 6.1% on 12/9/2020.  Patient remains on usual glipizide 5 mg daily.  She did receive insulin temporarily when hospitalized.     7. Laparoscopic cholecystectomy with multiple postoperative ERCPs 7/2006.     8. History of pancreas divisum.     9. Hyperplastic colonic polyps.     10. Lumbosacral spinal DJD with lumbar radiculopathy.     11. History of DJD.     12. History of Gamble's neuroma, left foot.     13. History of pulmonary embolism, left lower lobe.     14,  former Coumadin anticoagulation.     15. Gastroparesis.     16. GERD.     17. History of splenic artery aneurysm. The patient had a repeat CT angiogram on 10/29/2013 which demonstrated that the splenic artery aneurysm was unchanged in size.     18. History of reactive depression.     19. History of syncope with head trauma and tiny intracranial subarachnoid hemorrhage. Please see previous office notes for review of the day of 5/31. The patient had gotten up from the couch to use the bathroom when she became woozy lightheaded and fell, striking her head. She was taken [facility]. Head CT showed a left parietal scalp hematoma, along with a small 2 x 7 mm focus of increased attenuation within the left frontal sulcus consistent with a small amount of subarachnoid hemorrhage or petechial hemorrhage of adjacent cortex with no mass effect. The patient was transferred to [facility] observed for 2 days. Repeat CT scanning was done during that admission. It was thought that she might have had orthostatic hypotension as the cause of her fall and as noted. Her previously prescribed imdur was stopped. Patient has not had any recurrent syncopal episodes.      20. Status post partial colectomy for diverticulitis 6/16/2014 with postoperative C. difficile colitis. This patient was admitted to [facility] on 6/6/2014 with acute diverticulitis. She ultimately underwent a partial colectomy on 6/16. She was hospitalized for a period of one week afterwards and then sent to [facility] for 2 weeks rehabilitation. She was then readmitted to [facility] because of C. difficile colitis and was hospitalized for another 2 weeks. She was sent to [place] for rehabilitation for one week.      21. Hernia repair 2/2015 with Dr. Rodriguez at [facility].      22. Carotid vascular disease.     23. Palpitations. Hospital stay 09/2018 at [facility]. Was on metoprolol succinate 200 mg, but has decreased it to  100 mg without return of symptoms.      24. TIA. Had hospital stay at [facility] 01/17/2020 for suspected TIA. Had vision changes and buzzing in the head which have almost completely resolved. CT of brain was negative. MRI of brain was negative, but showed mastoiditis. She will schedule to see ENT. Will continue plavix. Is off asa.      25. Hx of covid-19 vaccine #1 and #2.  The patient was admitted to [facility] on 3/3/2024 - 3/8/2024 with COVID-19.  During that admission she was noted to have a sinus tachycardia.  Echocardiogram demonstrated an LV ejection fraction 60% 1-2+ mitral valve regurgitation 1+ tricuspid valve regurgitation aortic valve sclerosis.  Her metoprolol was increased to 100 mg twice daily.    26.  CKD       Kasandra Huizar, ADDIE-CNP

## 2025-01-24 NOTE — PROGRESS NOTES
Subjective   Patient ID: Catie Groves is a 89 y.o. female who presents for Follow-up and Med Refill.    Patient is here for follow-up.  She is overall feeling better.  She is drinking more water.    Wants to go back on Remeron having little anxiety issues  Denies any abdominal pain      Past recap  Patient is here for hospital follow-up.  Admitted for chest pain and shortness of breath and elevated blood pressure  Her stress test was negative.  Dose of Norvasc increased follow-up elevated blood pressure  Patient is just feeling little headache and he is little anxious  She does not have abdominal pain but she still feels a fullness in the abdomen  Follow-up blood work on diabetes cholesterol  Her knees were hurting in the hospital but they are doing better since she is moving around more    Patient presents with complaints of having urinary burning and frequency and dark urine in the morning for last couple of days.  Denies any fever or chills  Denies any back pain, feeling tired    Patient is here with complaints of having shoulder pain back pain generalized achiness not feeling good.  Not sure if she is having anxiety or having something wrong     patient is here for follow-up  Follow-up on hypertension high cholesterol diabetes coronary artery disease  Needs refills  Last week right knee started hurting    Past recap   patient is here for hospital follow-up and rehab follow-up.  She was admitted for COVID and dehydration  She is still feeling tired and has no ambition  Home care is still active and she is getting therapy   patient is here because she continues to have pain and funny sensation in the left lower quadrant   Patient is wants her kidneys checked she is worried about the kidneys.  She is also having pain in the right knee having discomfort and having discomfort when walking  She got nerve block from Dr. Jacome for lumbar radiculopathy      Past recap  patient is here for routine follow-up  Her  abdominal pain is doing better  She went to the GI and had colonoscopy done which was unremarkable.  Her pain went away by itself   Needs medication refill  Follow-up on hypertension diabetes high cholesterol did blood work     patient is here still having abdominal bloating still having left lower quadrant discomfort and very worried that something is wrong   She is drinking enough water  If you are in the morning but during the day does not pee as much     patient is here because she is still having off-and-on abdominal pain and feels very distended not having good bowel movement in spite of taking lactulose     Patient is here with complaints of having abdominal pain.  Last Wednesday went to the ER urine grew Klebsiella pneumonia treated for UTI  2 weeks ago was very constipated stomach was hurting all over no still gets distended and not having good bowel movement  She is on gabapentin by Dr. quesada  Her back also hurts quite a bit      patient is here for follow-up  Follow-up on coronary artery disease hypertension high cholesterol anxiety  Did blood work  Needs medications  Patient got II nerve blocks back pain is doing little better but she has really suffered from the back pain.  Steroid only helped temporarily      Blood pressure  Needs medication refill  Shingles pain is doing better  Follow-up on coronary artery disease hypertension high cholesterol  Anxiety is doing better sometimes she takes tramadol  Wants nausea medication for occasional use  Wants handicap sticker  Shingles vaccine she is due  Overall doing well        Patient is complaining of having right upper quadrant pain and soreness having loose stools cholestyramine is not agreeing  She is worried that her GI issues are flaring up  She has history of common bile duct stones and irritable bowel disease  She also has history of diverticulitis C. difficile colitis     patient went to see the GI for the abdominal discomfort she was having she  "had MRI done CAT scans and ultrasound done everything came back normal  She did blood work for cholesterol diabetes  Here for follow-up and needs refills on medications  Overall she is doing great  Wants prescription for Zofran for occasional use      patient fell and broke her nose has a lot of bruises. On Wednesday she was walking in the parking lot to get groceries and she fell on her face  Now she is having pain in the right elbow she did not get x-ray of the elbow done concerned if she has broken the bone  She has sutures on the chin     Patient here for follow-up on hospital stay  Was admitted for TIA symptoms started on Plavix  Her blood pressure was very high last evening she is concerned if Plavix is making her blood pressure goal  She called me early morning hours around 5 spoke to her and after that she felt comfortable and slept  blood pressure is better now   She is also concerned about findings on the mastoid in MRI  Still feels sinuses to be congested       Objective   /78   Ht 1.575 m (5' 2\")   Wt 60.8 kg (134 lb)   BMI 24.51 kg/m²     Physical Exam  Vitals reviewed.   Constitutional:       Appearance: Normal appearance.   HENT:      Head: Normocephalic and atraumatic.      Right Ear: Tympanic membrane, ear canal and external ear normal.      Left Ear: Tympanic membrane, ear canal and external ear normal.      Mouth/Throat:      Pharynx: Oropharynx is clear.   Eyes:      Extraocular Movements: Extraocular movements intact.      Conjunctiva/sclera: Conjunctivae normal.      Pupils: Pupils are equal, round, and reactive to light.   Cardiovascular:      Rate and Rhythm: Normal rate and regular rhythm.      Pulses: Normal pulses.      Heart sounds: Normal heart sounds.   Pulmonary:      Effort: Pulmonary effort is normal.      Breath sounds: Normal breath sounds.   Abdominal:      General: Abdomen is flat. Bowel sounds are normal.      Palpations: Abdomen is soft.   Musculoskeletal:      " Cervical back: Normal range of motion and neck supple.   Skin:     General: Skin is warm and dry.   Neurological:      General: No focal deficit present.      Mental Status: She is alert and oriented to person, place, and time.   Psychiatric:         Mood and Affect: Mood normal.         Assessment/Plan   Problem List Items Addressed This Visit          Cardiac and Vasculature    Benign essential HTN    Relevant Orders    CBC    Comprehensive Metabolic Panel    Hemoglobin A1C    Lipid Panel       Mental Health    Anxiety     Other Visit Diagnoses       Controlled type 2 diabetes mellitus without complication, without long-term current use of insulin (Multi)        Relevant Orders    CBC    Comprehensive Metabolic Panel    Hemoglobin A1C    Lipid Panel    Dyslipidemia        Relevant Orders    CBC    Comprehensive Metabolic Panel    Hemoglobin A1C    Lipid Panel            past recap  Patient symptoms could be related to gastroparesis  Patient quit taking Reglan  We we will try Reglan again  Try sucralfate to help with the acid reflux  She needs Plavix for her coronary artery disease and I do not think symptoms are caused by Plavix  Increase fiber in the diet  Follow-up if not better     10/21/22   Blood work results reviewed  Cholesterol well controlled  Sugar well controlled  Blood pressure stable  Patient had follow-up with a cardiologist coronary artery disease stable  Advised patient to take Metamucil to help with the loose stool  Explained that not safe to give standing order for antibiotics  Refer to physical therapy for lower back pain and hip pain  Medications refilled  Follow-up in 6 months     4/21/2023  Blood pressure stable  Creatinine has gone up to 1.69 BUN 42  Encourage patient to drink more water  Cut down salt intake if not better will do ultrasound kidneys and recheck blood work in 3 months  A1c has gone up to 7.6 patient has been really well controlled on diabetes all along  Steroids must be doing  it  We will recheck and see if needs medication adjustment  Patient wants to wait until next blood work  Cholesterol is okay  Follow-up blood work in 3 months     7/31/2023  ER records reviewed  BUN 23 creatinine 1.5 potassium 5.1  Last blood work here showed elevated creatinine and potassium was high but at that time patient says she was getting nerve blocks which made her kidneys work well  We will check UA C&S  Try lactulose for constipation  Medrol Dosepak for the back pain  Follow-up if not better     8/7/2023  Patient has distended abdomen  Stat CT of the abdomen pelvis done without contrast because of compromised kidney function  No acute pathology found  Advised patient to take lactulose twice a day or 3 times a day to see if it helps to improve and regularize her bowel movements  Follow-up if not better            8/24/2023  All blood work has been negative  CAT scan has been negative  We will do the ultrasound of the abdomen to make sure there is no urinary retention causing the symptoms  Refer to GI if ultrasound is negative    10/20/2023  Blood pressure stable  Cholesterol well controlled  Diabetes under control  Ursodiol given for bile duct/  Coronary artery disease stable patient to care of cardiologist  Medications refilled  Follow-up in 6 months      3/29/2024  Hospital records reviewed  Patient's blood sugars were running low  Also she was little anemic  Because of COVID probably she is still feeling tired needs to recover  Will check blood work again CBC CMP for B12 and iron  Advised to hold off diabetic medication if blood sugars are running low  Medications refilled  Home care orders done  Routine follow-up and follow-up as needed if not feeling better  Will call her with the results     7/19/2024  Patient does have arthritis in the knee  Lidoderm patch  Voltaren gel  Gabapentin to help with the pain  Refills given on ursodiol and glipizide  Blood work reviewed A1c and cholesterol is not  done  Will do blood work in 3 months    8/23/2024  EKG done shows normal sinus rhythm chest x-ray done which is unremarkable  UA done which is unremarkable  Patient could be having symptoms from anxiety  Advised to take anxiety medication  Patient has a follow-up with the cardiologist  Advised to go to the emergency room if symptoms gets worse    9/10/2024  Urine analysis shows UTI  Will send it for culture  Keflex for 10 days  Increase water intake  Follow-up if not better    10/19/2024  Hospital chart reviewed again  Stress test normal  Patient's blood pressure is fine  She is taking Norvasc 10 mg a day.  She was on it this dose in the past  She has follow-up with the cardiologist  She got the dye and her creatinine was slightly elevated  Will check her blood work  Diabetes well-controlled  Cholesterol well-controlled  Lipase was elevated  But it came down  Wondering if symptoms were related to her issues with sludge  Clinically patient is doing much better now  Will recheck in 3 months    1/24/2025  Blood work looks very good kidney function improving  Cholesterol well-controlled  Blood pressure stable  Alk phos slightly elevated but patient is asymptomatic  Restart Remeron  Follow-up blood work in 3 months

## 2025-02-26 ENCOUNTER — OFFICE VISIT (OUTPATIENT)
Dept: PRIMARY CARE | Facility: CLINIC | Age: OVER 89
End: 2025-02-26
Payer: MEDICARE

## 2025-02-26 VITALS
DIASTOLIC BLOOD PRESSURE: 76 MMHG | WEIGHT: 134 LBS | HEIGHT: 62 IN | BODY MASS INDEX: 24.66 KG/M2 | SYSTOLIC BLOOD PRESSURE: 134 MMHG

## 2025-02-26 DIAGNOSIS — N64.4 BREAST PAIN: ICD-10-CM

## 2025-02-26 DIAGNOSIS — R30.9 URINARY PAIN: ICD-10-CM

## 2025-02-26 DIAGNOSIS — R07.9 CHEST PAIN, UNSPECIFIED TYPE: ICD-10-CM

## 2025-02-26 LAB
POC APPEARANCE, URINE: CLEAR
POC BILIRUBIN, URINE: NEGATIVE
POC BLOOD, URINE: NEGATIVE
POC COLOR, URINE: YELLOW
POC GLUCOSE, URINE: NEGATIVE MG/DL
POC KETONES, URINE: NEGATIVE MG/DL
POC LEUKOCYTES, URINE: ABNORMAL
POC NITRITE,URINE: NEGATIVE
POC PH, URINE: 6.5 PH
POC PROTEIN, URINE: ABNORMAL MG/DL
POC SPECIFIC GRAVITY, URINE: >=1.03
POC UROBILINOGEN, URINE: 0.2 EU/DL

## 2025-02-26 PROCEDURE — 3075F SYST BP GE 130 - 139MM HG: CPT | Performed by: INTERNAL MEDICINE

## 2025-02-26 PROCEDURE — 3078F DIAST BP <80 MM HG: CPT | Performed by: INTERNAL MEDICINE

## 2025-02-26 PROCEDURE — 1123F ACP DISCUSS/DSCN MKR DOCD: CPT | Performed by: INTERNAL MEDICINE

## 2025-02-26 PROCEDURE — 93000 ELECTROCARDIOGRAM COMPLETE: CPT | Performed by: INTERNAL MEDICINE

## 2025-02-26 PROCEDURE — 99214 OFFICE O/P EST MOD 30 MIN: CPT | Performed by: INTERNAL MEDICINE

## 2025-02-26 PROCEDURE — 81003 URINALYSIS AUTO W/O SCOPE: CPT | Performed by: INTERNAL MEDICINE

## 2025-02-26 PROCEDURE — 1159F MED LIST DOCD IN RCRD: CPT | Performed by: INTERNAL MEDICINE

## 2025-03-01 LAB
BACTERIA UR CULT: ABNORMAL
QUEST CLIENT EDUCATION TRACKING: NORMAL
QUEST TRACKING HOUSE ACCOUNT: NORMAL

## 2025-03-13 ENCOUNTER — HOSPITAL ENCOUNTER (OUTPATIENT)
Dept: RADIOLOGY | Facility: HOSPITAL | Age: OVER 89
Discharge: HOME | End: 2025-03-13
Payer: MEDICARE

## 2025-03-13 VITALS — BODY MASS INDEX: 24.66 KG/M2 | HEIGHT: 62 IN | WEIGHT: 134 LBS

## 2025-03-13 DIAGNOSIS — N64.4 BREAST PAIN: ICD-10-CM

## 2025-03-13 DIAGNOSIS — N64.4 MASTODYNIA: ICD-10-CM

## 2025-03-13 PROCEDURE — 77062 BREAST TOMOSYNTHESIS BI: CPT | Performed by: RADIOLOGY

## 2025-03-13 PROCEDURE — 77066 DX MAMMO INCL CAD BI: CPT

## 2025-03-13 PROCEDURE — 77066 DX MAMMO INCL CAD BI: CPT | Performed by: RADIOLOGY

## 2025-03-17 NOTE — PROGRESS NOTES
Subjective   Patient ID: Catie Groves is a 89 y.o. female who presents for Breast Pain.    Patient presents with complaints of having left breast pain.  She is concerned about her heart  Also concerned if she is having UTI    Past recap  Patient is here for follow-up.  She is overall feeling better.  She is drinking more water.    Wants to go back on Remeron having little anxiety issues  Denies any abdominal pain      Past recap  Patient is here for hospital follow-up.  Admitted for chest pain and shortness of breath and elevated blood pressure  Her stress test was negative.  Dose of Norvasc increased follow-up elevated blood pressure  Patient is just feeling little headache and he is little anxious  She does not have abdominal pain but she still feels a fullness in the abdomen  Follow-up blood work on diabetes cholesterol  Her knees were hurting in the hospital but they are doing better since she is moving around more    Patient presents with complaints of having urinary burning and frequency and dark urine in the morning for last couple of days.  Denies any fever or chills  Denies any back pain, feeling tired    Patient is here with complaints of having shoulder pain back pain generalized achiness not feeling good.  Not sure if she is having anxiety or having something wrong     patient is here for follow-up  Follow-up on hypertension high cholesterol diabetes coronary artery disease  Needs refills  Last week right knee started hurting    Past recap   patient is here for hospital follow-up and rehab follow-up.  She was admitted for COVID and dehydration  She is still feeling tired and has no ambition  Home care is still active and she is getting therapy   patient is here because she continues to have pain and funny sensation in the left lower quadrant   Patient is wants her kidneys checked she is worried about the kidneys.  She is also having pain in the right knee having discomfort and having discomfort when  walking  She got nerve block from Dr. Quesada for lumbar radiculopathy      Past recap  patient is here for routine follow-up  Her abdominal pain is doing better  She went to the GI and had colonoscopy done which was unremarkable.  Her pain went away by itself   Needs medication refill  Follow-up on hypertension diabetes high cholesterol did blood work     patient is here still having abdominal bloating still having left lower quadrant discomfort and very worried that something is wrong   She is drinking enough water  If you are in the morning but during the day does not pee as much     patient is here because she is still having off-and-on abdominal pain and feels very distended not having good bowel movement in spite of taking lactulose     Patient is here with complaints of having abdominal pain.  Last Wednesday went to the ER urine grew Klebsiella pneumonia treated for UTI  2 weeks ago was very constipated stomach was hurting all over no still gets distended and not having good bowel movement  She is on gabapentin by Dr. quesada  Her back also hurts quite a bit      patient is here for follow-up  Follow-up on coronary artery disease hypertension high cholesterol anxiety  Did blood work  Needs medications  Patient got II nerve blocks back pain is doing little better but she has really suffered from the back pain.  Steroid only helped temporarily      Blood pressure  Needs medication refill  Shingles pain is doing better  Follow-up on coronary artery disease hypertension high cholesterol  Anxiety is doing better sometimes she takes tramadol  Wants nausea medication for occasional use  Wants handicap sticker  Shingles vaccine she is due  Overall doing well        Patient is complaining of having right upper quadrant pain and soreness having loose stools cholestyramine is not agreeing  She is worried that her GI issues are flaring up  She has history of common bile duct stones and irritable bowel disease  She also  "has history of diverticulitis C. difficile colitis     patient went to see the GI for the abdominal discomfort she was having she had MRI done CAT scans and ultrasound done everything came back normal  She did blood work for cholesterol diabetes  Here for follow-up and needs refills on medications  Overall she is doing great  Wants prescription for Zofran for occasional use      patient fell and broke her nose has a lot of bruises. On Wednesday she was walking in the parking lot to get groceries and she fell on her face  Now she is having pain in the right elbow she did not get x-ray of the elbow done concerned if she has broken the bone  She has sutures on the chin     Patient here for follow-up on hospital stay  Was admitted for TIA symptoms started on Plavix  Her blood pressure was very high last evening she is concerned if Plavix is making her blood pressure goal  She called me early morning hours around 5 spoke to her and after that she felt comfortable and slept  blood pressure is better now   She is also concerned about findings on the mastoid in MRI  Still feels sinuses to be congested       Objective   /76   Ht 1.575 m (5' 2\")   Wt 60.8 kg (134 lb)   BMI 24.51 kg/m²     Physical Exam  Vitals reviewed.   Constitutional:       Appearance: Normal appearance.   HENT:      Head: Normocephalic and atraumatic.      Right Ear: Tympanic membrane, ear canal and external ear normal.      Left Ear: Tympanic membrane, ear canal and external ear normal.      Mouth/Throat:      Pharynx: Oropharynx is clear.   Eyes:      Extraocular Movements: Extraocular movements intact.      Conjunctiva/sclera: Conjunctivae normal.      Pupils: Pupils are equal, round, and reactive to light.   Cardiovascular:      Rate and Rhythm: Normal rate and regular rhythm.      Pulses: Normal pulses.      Heart sounds: Normal heart sounds.   Pulmonary:      Effort: Pulmonary effort is normal.      Breath sounds: Normal breath sounds. "   Abdominal:      General: Abdomen is flat. Bowel sounds are normal.      Palpations: Abdomen is soft.   Musculoskeletal:      Cervical back: Normal range of motion and neck supple.   Skin:     General: Skin is warm and dry.   Neurological:      General: No focal deficit present.      Mental Status: She is alert and oriented to person, place, and time.   Psychiatric:         Mood and Affect: Mood normal.         Assessment/Plan   Problem List Items Addressed This Visit          Cardiac and Vasculature    Chest pain    Relevant Orders    ECG 12 lead (Clinic Performed)     Other Visit Diagnoses       Urinary pain        Relevant Orders    POCT UA Automated manually resulted (Completed)    Breast pain        Relevant Orders    BI mammo bilateral diagnostic tomosynthesis (Completed)            past recap  Patient symptoms could be related to gastroparesis  Patient quit taking Reglan  We we will try Reglan again  Try sucralfate to help with the acid reflux  She needs Plavix for her coronary artery disease and I do not think symptoms are caused by Plavix  Increase fiber in the diet  Follow-up if not better     10/21/22   Blood work results reviewed  Cholesterol well controlled  Sugar well controlled  Blood pressure stable  Patient had follow-up with a cardiologist coronary artery disease stable  Advised patient to take Metamucil to help with the loose stool  Explained that not safe to give standing order for antibiotics  Refer to physical therapy for lower back pain and hip pain  Medications refilled  Follow-up in 6 months     4/21/2023  Blood pressure stable  Creatinine has gone up to 1.69 BUN 42  Encourage patient to drink more water  Cut down salt intake if not better will do ultrasound kidneys and recheck blood work in 3 months  A1c has gone up to 7.6 patient has been really well controlled on diabetes all along  Steroids must be doing it  We will recheck and see if needs medication adjustment  Patient wants to wait  until next blood work  Cholesterol is okay  Follow-up blood work in 3 months     7/31/2023  ER records reviewed  BUN 23 creatinine 1.5 potassium 5.1  Last blood work here showed elevated creatinine and potassium was high but at that time patient says she was getting nerve blocks which made her kidneys work well  We will check UA C&S  Try lactulose for constipation  Medrol Dosepak for the back pain  Follow-up if not better     8/7/2023  Patient has distended abdomen  Stat CT of the abdomen pelvis done without contrast because of compromised kidney function  No acute pathology found  Advised patient to take lactulose twice a day or 3 times a day to see if it helps to improve and regularize her bowel movements  Follow-up if not better            8/24/2023  All blood work has been negative  CAT scan has been negative  We will do the ultrasound of the abdomen to make sure there is no urinary retention causing the symptoms  Refer to GI if ultrasound is negative    10/20/2023  Blood pressure stable  Cholesterol well controlled  Diabetes under control  Ursodiol given for bile duct/  Coronary artery disease stable patient to care of cardiologist  Medications refilled  Follow-up in 6 months      3/29/2024  Hospital records reviewed  Patient's blood sugars were running low  Also she was little anemic  Because of COVID probably she is still feeling tired needs to recover  Will check blood work again CBC CMP for B12 and iron  Advised to hold off diabetic medication if blood sugars are running low  Medications refilled  Home care orders done  Routine follow-up and follow-up as needed if not feeling better  Will call her with the results     7/19/2024  Patient does have arthritis in the knee  Lidoderm patch  Voltaren gel  Gabapentin to help with the pain  Refills given on ursodiol and glipizide  Blood work reviewed A1c and cholesterol is not done  Will do blood work in 3 months    8/23/2024  EKG done shows normal sinus rhythm  chest x-ray done which is unremarkable  UA done which is unremarkable  Patient could be having symptoms from anxiety  Advised to take anxiety medication  Patient has a follow-up with the cardiologist  Advised to go to the emergency room if symptoms gets worse    9/10/2024  Urine analysis shows UTI  Will send it for culture  Keflex for 10 days  Increase water intake  Follow-up if not better    10/19/2024  Hospital chart reviewed again  Stress test normal  Patient's blood pressure is fine  She is taking Norvasc 10 mg a day.  She was on it this dose in the past  She has follow-up with the cardiologist  She got the dye and her creatinine was slightly elevated  Will check her blood work  Diabetes well-controlled  Cholesterol well-controlled  Lipase was elevated  But it came down  Wondering if symptoms were related to her issues with sludge  Clinically patient is doing much better now  Will recheck in 3 months    1/24/2025  Blood work looks very good kidney function improving  Cholesterol well-controlled  Blood pressure stable  Alk phos slightly elevated but patient is asymptomatic  Restart Remeron  Follow-up blood work in 3 months    2/26/2025  EKG done shows normal sinus rhythm  Reviewed them negative  Will order diagnostic mammogram  Patient appears to be quite anxious

## 2025-03-18 ENCOUNTER — OFFICE VISIT (OUTPATIENT)
Dept: PRIMARY CARE | Facility: CLINIC | Age: OVER 89
End: 2025-03-18
Payer: MEDICARE

## 2025-03-18 VITALS
DIASTOLIC BLOOD PRESSURE: 64 MMHG | BODY MASS INDEX: 25.03 KG/M2 | WEIGHT: 136 LBS | HEIGHT: 62 IN | SYSTOLIC BLOOD PRESSURE: 122 MMHG

## 2025-03-18 DIAGNOSIS — J32.9 SINUSITIS, UNSPECIFIED CHRONICITY, UNSPECIFIED LOCATION: ICD-10-CM

## 2025-03-18 PROCEDURE — 1123F ACP DISCUSS/DSCN MKR DOCD: CPT | Performed by: INTERNAL MEDICINE

## 2025-03-18 PROCEDURE — 1159F MED LIST DOCD IN RCRD: CPT | Performed by: INTERNAL MEDICINE

## 2025-03-18 PROCEDURE — 3078F DIAST BP <80 MM HG: CPT | Performed by: INTERNAL MEDICINE

## 2025-03-18 PROCEDURE — 99213 OFFICE O/P EST LOW 20 MIN: CPT | Performed by: INTERNAL MEDICINE

## 2025-03-18 PROCEDURE — 3074F SYST BP LT 130 MM HG: CPT | Performed by: INTERNAL MEDICINE

## 2025-03-18 RX ORDER — AZITHROMYCIN 250 MG/1
TABLET, FILM COATED ORAL
Qty: 6 TABLET | Refills: 0 | Status: SHIPPED | OUTPATIENT
Start: 2025-03-18 | End: 2025-03-23

## 2025-03-18 RX ORDER — LORATADINE 10 MG/1
10 TABLET ORAL DAILY
Qty: 30 TABLET | Refills: 2 | Status: SHIPPED | OUTPATIENT
Start: 2025-03-18 | End: 2025-06-16

## 2025-03-22 NOTE — PROGRESS NOTES
Subjective   Patient ID: Catie Groves is a 89 y.o. female who presents for Sick Visit.    Patient is here with complaints of feeling sick.  Having sinus congestion postnasal drainage chest congestion     Past recap  Patient presents with complaints of having left breast pain.  She is concerned about her heart  Also concerned if she is having UTI    Patient is here for follow-up.  She is overall feeling better.  She is drinking more water.    Wants to go back on Remeron having little anxiety issues  Denies any abdominal pain    Patient is here for hospital follow-up.  Admitted for chest pain and shortness of breath and elevated blood pressure  Her stress test was negative.  Dose of Norvasc increased follow-up elevated blood pressure  Patient is just feeling little headache and he is little anxious  She does not have abdominal pain but she still feels a fullness in the abdomen  Follow-up blood work on diabetes cholesterol  Her knees were hurting in the hospital but they are doing better since she is moving around more    Patient presents with complaints of having urinary burning and frequency and dark urine in the morning for last couple of days.  Denies any fever or chills  Denies any back pain, feeling tired    Patient is here with complaints of having shoulder pain back pain generalized achiness not feeling good.  Not sure if she is having anxiety or having something wrong     patient is here for follow-up  Follow-up on hypertension high cholesterol diabetes coronary artery disease  Needs refills  Last week right knee started hurting    Past recap   patient is here for hospital follow-up and rehab follow-up.  She was admitted for COVID and dehydration  She is still feeling tired and has no ambition  Home care is still active and she is getting therapy   patient is here because she continues to have pain and funny sensation in the left lower quadrant   Patient is wants her kidneys checked she is worried about the  kidneys.  She is also having pain in the right knee having discomfort and having discomfort when walking  She got nerve block from Dr. Quesada for lumbar radiculopathy      Past recap  patient is here for routine follow-up  Her abdominal pain is doing better  She went to the GI and had colonoscopy done which was unremarkable.  Her pain went away by itself   Needs medication refill  Follow-up on hypertension diabetes high cholesterol did blood work     patient is here still having abdominal bloating still having left lower quadrant discomfort and very worried that something is wrong   She is drinking enough water  If you are in the morning but during the day does not pee as much     patient is here because she is still having off-and-on abdominal pain and feels very distended not having good bowel movement in spite of taking lactulose     Patient is here with complaints of having abdominal pain.  Last Wednesday went to the ER urine grew Klebsiella pneumonia treated for UTI  2 weeks ago was very constipated stomach was hurting all over no still gets distended and not having good bowel movement  She is on gabapentin by Dr. quesada  Her back also hurts quite a bit      patient is here for follow-up  Follow-up on coronary artery disease hypertension high cholesterol anxiety  Did blood work  Needs medications  Patient got II nerve blocks back pain is doing little better but she has really suffered from the back pain.  Steroid only helped temporarily      Blood pressure  Needs medication refill  Shingles pain is doing better  Follow-up on coronary artery disease hypertension high cholesterol  Anxiety is doing better sometimes she takes tramadol  Wants nausea medication for occasional use  Wants handicap sticker  Shingles vaccine she is due  Overall doing well        Patient is complaining of having right upper quadrant pain and soreness having loose stools cholestyramine is not agreeing  She is worried that her GI issues  "are flaring up  She has history of common bile duct stones and irritable bowel disease  She also has history of diverticulitis C. difficile colitis     patient went to see the GI for the abdominal discomfort she was having she had MRI done CAT scans and ultrasound done everything came back normal  She did blood work for cholesterol diabetes  Here for follow-up and needs refills on medications  Overall she is doing great  Wants prescription for Zofran for occasional use      patient fell and broke her nose has a lot of bruises. On Wednesday she was walking in the parking lot to get groceries and she fell on her face  Now she is having pain in the right elbow she did not get x-ray of the elbow done concerned if she has broken the bone  She has sutures on the chin     Patient here for follow-up on hospital stay  Was admitted for TIA symptoms started on Plavix  Her blood pressure was very high last evening she is concerned if Plavix is making her blood pressure goal  She called me early morning hours around 5 spoke to her and after that she felt comfortable and slept  blood pressure is better now   She is also concerned about findings on the mastoid in MRI  Still feels sinuses to be congested       Objective   /64   Ht 1.575 m (5' 2\")   Wt 61.7 kg (136 lb)   BMI 24.87 kg/m²     Physical Exam  Vitals reviewed.   Constitutional:       Appearance: Normal appearance.   HENT:      Head: Normocephalic and atraumatic.      Right Ear: Tympanic membrane, ear canal and external ear normal.      Left Ear: Tympanic membrane, ear canal and external ear normal.      Nose: Congestion and rhinorrhea present.      Mouth/Throat:      Pharynx: Oropharynx is clear.   Eyes:      Extraocular Movements: Extraocular movements intact.      Conjunctiva/sclera: Conjunctivae normal.      Pupils: Pupils are equal, round, and reactive to light.   Cardiovascular:      Rate and Rhythm: Normal rate and regular rhythm.      Pulses: Normal " pulses.      Heart sounds: Normal heart sounds.   Pulmonary:      Effort: Pulmonary effort is normal.      Breath sounds: Normal breath sounds.   Abdominal:      General: Abdomen is flat. Bowel sounds are normal.      Palpations: Abdomen is soft.   Musculoskeletal:      Cervical back: Normal range of motion and neck supple.   Skin:     General: Skin is warm and dry.   Neurological:      General: No focal deficit present.      Mental Status: She is alert and oriented to person, place, and time.   Psychiatric:         Mood and Affect: Mood normal.         Assessment/Plan   Problem List Items Addressed This Visit          ENT    Sinusitis    Relevant Medications    loratadine (Claritin) 10 mg tablet    azithromycin (Zithromax) 250 mg tablet       past recap  Patient symptoms could be related to gastroparesis  Patient quit taking Reglan  We we will try Reglan again  Try sucralfate to help with the acid reflux  She needs Plavix for her coronary artery disease and I do not think symptoms are caused by Plavix  Increase fiber in the diet  Follow-up if not better     10/21/22   Blood work results reviewed  Cholesterol well controlled  Sugar well controlled  Blood pressure stable  Patient had follow-up with a cardiologist coronary artery disease stable  Advised patient to take Metamucil to help with the loose stool  Explained that not safe to give standing order for antibiotics  Refer to physical therapy for lower back pain and hip pain  Medications refilled  Follow-up in 6 months     4/21/2023  Blood pressure stable  Creatinine has gone up to 1.69 BUN 42  Encourage patient to drink more water  Cut down salt intake if not better will do ultrasound kidneys and recheck blood work in 3 months  A1c has gone up to 7.6 patient has been really well controlled on diabetes all along  Steroids must be doing it  We will recheck and see if needs medication adjustment  Patient wants to wait until next blood work  Cholesterol is  okay  Follow-up blood work in 3 months     7/31/2023  ER records reviewed  BUN 23 creatinine 1.5 potassium 5.1  Last blood work here showed elevated creatinine and potassium was high but at that time patient says she was getting nerve blocks which made her kidneys work well  We will check UA C&S  Try lactulose for constipation  Medrol Dosepak for the back pain  Follow-up if not better     8/7/2023  Patient has distended abdomen  Stat CT of the abdomen pelvis done without contrast because of compromised kidney function  No acute pathology found  Advised patient to take lactulose twice a day or 3 times a day to see if it helps to improve and regularize her bowel movements  Follow-up if not better            8/24/2023  All blood work has been negative  CAT scan has been negative  We will do the ultrasound of the abdomen to make sure there is no urinary retention causing the symptoms  Refer to GI if ultrasound is negative    10/20/2023  Blood pressure stable  Cholesterol well controlled  Diabetes under control  Ursodiol given for bile duct/  Coronary artery disease stable patient to care of cardiologist  Medications refilled  Follow-up in 6 months      3/29/2024  Hospital records reviewed  Patient's blood sugars were running low  Also she was little anemic  Because of COVID probably she is still feeling tired needs to recover  Will check blood work again CBC CMP for B12 and iron  Advised to hold off diabetic medication if blood sugars are running low  Medications refilled  Home care orders done  Routine follow-up and follow-up as needed if not feeling better  Will call her with the results     7/19/2024  Patient does have arthritis in the knee  Lidoderm patch  Voltaren gel  Gabapentin to help with the pain  Refills given on ursodiol and glipizide  Blood work reviewed A1c and cholesterol is not done  Will do blood work in 3 months    8/23/2024  EKG done shows normal sinus rhythm chest x-ray done which is unremarkable  UA  done which is unremarkable  Patient could be having symptoms from anxiety  Advised to take anxiety medication  Patient has a follow-up with the cardiologist  Advised to go to the emergency room if symptoms gets worse    9/10/2024  Urine analysis shows UTI  Will send it for culture  Keflex for 10 days  Increase water intake  Follow-up if not better    10/19/2024  Hospital chart reviewed again  Stress test normal  Patient's blood pressure is fine  She is taking Norvasc 10 mg a day.  She was on it this dose in the past  She has follow-up with the cardiologist  She got the dye and her creatinine was slightly elevated  Will check her blood work  Diabetes well-controlled  Cholesterol well-controlled  Lipase was elevated  But it came down  Wondering if symptoms were related to her issues with sludge  Clinically patient is doing much better now  Will recheck in 3 months    1/24/2025  Blood work looks very good kidney function improving  Cholesterol well-controlled  Blood pressure stable  Alk phos slightly elevated but patient is asymptomatic  Restart Remeron  Follow-up blood work in 3 months    2/26/2025  EKG done shows normal sinus rhythm  Reviewed them negative  Will order diagnostic mammogram  Patient appears to be quite anxious    3/18/2025  Treat with Z-Baldemar  Steam saline gargles rest fluids  Follow-up if not better

## 2025-04-22 ENCOUNTER — APPOINTMENT (OUTPATIENT)
Dept: OPHTHALMOLOGY | Facility: CLINIC | Age: OVER 89
End: 2025-04-22
Payer: MEDICARE

## 2025-04-22 DIAGNOSIS — E11.9 TYPE 2 DIABETES MELLITUS WITHOUT RETINOPATHY (MULTI): ICD-10-CM

## 2025-04-22 DIAGNOSIS — H04.123 DRY EYE SYNDROME OF LACRIMAL GLAND, BILATERAL: ICD-10-CM

## 2025-04-22 DIAGNOSIS — H53.002 AMBLYOPIA OF LEFT EYE: ICD-10-CM

## 2025-04-22 DIAGNOSIS — H35.3131 EARLY DRY STAGE NONEXUDATIVE AGE-RELATED MACULAR DEGENERATION OF BOTH EYES: Primary | ICD-10-CM

## 2025-04-22 PROCEDURE — 92134 CPTRZ OPH DX IMG PST SGM RTA: CPT | Performed by: STUDENT IN AN ORGANIZED HEALTH CARE EDUCATION/TRAINING PROGRAM

## 2025-04-22 PROCEDURE — 92014 COMPRE OPH EXAM EST PT 1/>: CPT | Performed by: STUDENT IN AN ORGANIZED HEALTH CARE EDUCATION/TRAINING PROGRAM

## 2025-04-22 PROCEDURE — 2023F DILAT RTA XM W/O RTNOPTHY: CPT | Performed by: STUDENT IN AN ORGANIZED HEALTH CARE EDUCATION/TRAINING PROGRAM

## 2025-04-22 ASSESSMENT — VISUAL ACUITY
OD_CC: 20/30
OS_CC: 20/60+1
OD_PH_CC: 20/25+1
METHOD: SNELLEN - LINEAR
CORRECTION_TYPE: GLASSES

## 2025-04-22 ASSESSMENT — CONF VISUAL FIELD
OS_INFERIOR_TEMPORAL_RESTRICTION: 0
OS_INFERIOR_NASAL_RESTRICTION: 0
OS_SUPERIOR_TEMPORAL_RESTRICTION: 0
OS_SUPERIOR_NASAL_RESTRICTION: 0
OD_INFERIOR_NASAL_RESTRICTION: 0
OD_SUPERIOR_NASAL_RESTRICTION: 0
OD_SUPERIOR_TEMPORAL_RESTRICTION: 0
OD_INFERIOR_TEMPORAL_RESTRICTION: 0
OD_NORMAL: 1
OS_NORMAL: 1

## 2025-04-22 ASSESSMENT — TONOMETRY
IOP_METHOD: GOLDMANN APPLANATION
OD_IOP_MMHG: 12
OS_IOP_MMHG: 14

## 2025-04-22 ASSESSMENT — ENCOUNTER SYMPTOMS
NEUROLOGICAL NEGATIVE: 0
RESPIRATORY NEGATIVE: 0
PSYCHIATRIC NEGATIVE: 0
CARDIOVASCULAR NEGATIVE: 0
MUSCULOSKELETAL NEGATIVE: 0
CONSTITUTIONAL NEGATIVE: 0
ALLERGIC/IMMUNOLOGIC NEGATIVE: 0
GASTROINTESTINAL NEGATIVE: 0
EYES NEGATIVE: 0
ENDOCRINE NEGATIVE: 0
HEMATOLOGIC/LYMPHATIC NEGATIVE: 0

## 2025-04-22 ASSESSMENT — CUP TO DISC RATIO
OD_RATIO: .30
OS_RATIO: .30

## 2025-04-22 ASSESSMENT — SLIT LAMP EXAM - LIDS
COMMENTS: MGD UL/LL C SCALLOPED LID MARGIN
COMMENTS: MGD UL/LL C SCALLOPED LID MARGIN

## 2025-04-22 ASSESSMENT — EXTERNAL EXAM - RIGHT EYE: OD_EXAM: DERMATOCHALASIS UL

## 2025-04-22 ASSESSMENT — EXTERNAL EXAM - LEFT EYE: OS_EXAM: DERMATOCHALASIS UL

## 2025-04-22 NOTE — PROGRESS NOTES
Assessment/Plan   Diagnoses and all orders for this visit:  Type 2 diabetes mellitus without retinopathy (CMS/HCC)  -No retinopathy observed on exam today od/os, pt ed to continue good BGlc, blood pressure and lipid control, rtc with any changes in vision, otherwise monitor 1 year  Early dry stage nonexudative age-related macular degeneration of both eyes  -Stable findings today. Ed on continued healthy diet/exercise/no smoking/sunglasses/ and monitoring visual acuity (VA) with amsler and RTC immediately for any vision changes.  -OCT MAC stable today  Amblyopia of left eye  Bilateral myopia  Regular astigmatism of both eyes  Pseudophakia  Dry Eye Syndrome Bilateral Lacrimal Glands  -BCVA stable today OD ph 20/25. Hx of amblyopia OS-stable VA.    Reduction in BCVA due to ocular surface dryness; patient has started using Tears every other day with improvement in vision and ocular surface  Continue baseline treatments OTC Refresh or Systane TID, warm compresses 5 min/day, and Systane PM or Genteal kishan at bedtime.    RTC 6 months for annual with MRX, DFE, and Oct MAC

## 2025-04-28 DIAGNOSIS — E11.9 TYPE 2 DIABETES MELLITUS WITHOUT RETINOPATHY (MULTI): ICD-10-CM

## 2025-04-28 DIAGNOSIS — E78.5 HYPERLIPIDEMIA, UNSPECIFIED HYPERLIPIDEMIA TYPE: ICD-10-CM

## 2025-04-28 DIAGNOSIS — I10 BENIGN ESSENTIAL HTN: ICD-10-CM

## 2025-05-03 LAB
ALBUMIN SERPL-MCNC: 4.3 G/DL (ref 3.6–5.1)
ALP SERPL-CCNC: 150 U/L (ref 37–153)
ALT SERPL-CCNC: 13 U/L (ref 6–29)
ANION GAP SERPL CALCULATED.4IONS-SCNC: 11 MMOL/L (CALC) (ref 7–17)
AST SERPL-CCNC: 26 U/L (ref 10–35)
BILIRUB SERPL-MCNC: 0.4 MG/DL (ref 0.2–1.2)
BUN SERPL-MCNC: 31 MG/DL (ref 7–25)
CALCIUM SERPL-MCNC: 9.8 MG/DL (ref 8.6–10.4)
CHLORIDE SERPL-SCNC: 109 MMOL/L (ref 98–110)
CHOLEST SERPL-MCNC: 144 MG/DL
CHOLEST/HDLC SERPL: 2.7 (CALC)
CO2 SERPL-SCNC: 20 MMOL/L (ref 20–32)
CREAT SERPL-MCNC: 1.62 MG/DL (ref 0.6–0.95)
EGFRCR SERPLBLD CKD-EPI 2021: 29 ML/MIN/1.73M2
ERYTHROCYTE [DISTWIDTH] IN BLOOD BY AUTOMATED COUNT: 11.8 % (ref 11–15)
EST. AVERAGE GLUCOSE BLD GHB EST-MCNC: 140 MG/DL
EST. AVERAGE GLUCOSE BLD GHB EST-SCNC: 7.7 MMOL/L
GLUCOSE SERPL-MCNC: 104 MG/DL (ref 65–99)
HBA1C MFR BLD: 6.5 %
HCT VFR BLD AUTO: 38.1 % (ref 35–45)
HDLC SERPL-MCNC: 53 MG/DL
HGB BLD-MCNC: 12.3 G/DL (ref 11.7–15.5)
LDLC SERPL CALC-MCNC: 70 MG/DL (CALC)
MCH RBC QN AUTO: 31.1 PG (ref 27–33)
MCHC RBC AUTO-ENTMCNC: 32.3 G/DL (ref 32–36)
MCV RBC AUTO: 96.5 FL (ref 80–100)
NONHDLC SERPL-MCNC: 91 MG/DL (CALC)
PLATELET # BLD AUTO: 262 THOUSAND/UL (ref 140–400)
PMV BLD REES-ECKER: 9.2 FL (ref 7.5–12.5)
POTASSIUM SERPL-SCNC: 5.6 MMOL/L (ref 3.5–5.3)
PROT SERPL-MCNC: 6.9 G/DL (ref 6.1–8.1)
RBC # BLD AUTO: 3.95 MILLION/UL (ref 3.8–5.1)
SODIUM SERPL-SCNC: 140 MMOL/L (ref 135–146)
TRIGL SERPL-MCNC: 129 MG/DL
WBC # BLD AUTO: 6.2 THOUSAND/UL (ref 3.8–10.8)

## 2025-05-08 ENCOUNTER — OFFICE VISIT (OUTPATIENT)
Dept: CARDIOLOGY | Facility: CLINIC | Age: OVER 89
End: 2025-05-08
Payer: MEDICARE

## 2025-05-08 VITALS
HEART RATE: 70 BPM | SYSTOLIC BLOOD PRESSURE: 148 MMHG | DIASTOLIC BLOOD PRESSURE: 70 MMHG | OXYGEN SATURATION: 96 % | HEIGHT: 62 IN | WEIGHT: 139 LBS | BODY MASS INDEX: 25.58 KG/M2

## 2025-05-08 DIAGNOSIS — U07.1 COVID-19: ICD-10-CM

## 2025-05-08 DIAGNOSIS — I25.10 ARTERIOSCLEROTIC CARDIOVASCULAR DISEASE: ICD-10-CM

## 2025-05-08 PROCEDURE — 99214 OFFICE O/P EST MOD 30 MIN: CPT | Performed by: INTERNAL MEDICINE

## 2025-05-08 PROCEDURE — 1036F TOBACCO NON-USER: CPT | Performed by: INTERNAL MEDICINE

## 2025-05-08 PROCEDURE — G2211 COMPLEX E/M VISIT ADD ON: HCPCS | Performed by: INTERNAL MEDICINE

## 2025-05-08 PROCEDURE — 1160F RVW MEDS BY RX/DR IN RCRD: CPT | Performed by: INTERNAL MEDICINE

## 2025-05-08 PROCEDURE — 1159F MED LIST DOCD IN RCRD: CPT | Performed by: INTERNAL MEDICINE

## 2025-05-08 PROCEDURE — 3078F DIAST BP <80 MM HG: CPT | Performed by: INTERNAL MEDICINE

## 2025-05-08 PROCEDURE — 3077F SYST BP >= 140 MM HG: CPT | Performed by: INTERNAL MEDICINE

## 2025-05-08 RX ORDER — METOPROLOL SUCCINATE 100 MG/1
100 TABLET, EXTENDED RELEASE ORAL 2 TIMES DAILY
Qty: 180 TABLET | Refills: 3 | Status: SHIPPED | OUTPATIENT
Start: 2025-05-08 | End: 2026-05-08

## 2025-05-08 RX ORDER — LOSARTAN POTASSIUM 100 MG/1
100 TABLET ORAL DAILY
Qty: 90 TABLET | Refills: 3 | Status: SHIPPED
Start: 2025-05-08 | End: 2025-05-08 | Stop reason: DRUGHIGH

## 2025-05-08 RX ORDER — LOSARTAN POTASSIUM 50 MG/1
50 TABLET ORAL DAILY
Qty: 90 TABLET | Refills: 3 | Status: SHIPPED | OUTPATIENT
Start: 2025-05-08 | End: 2026-05-08

## 2025-05-08 RX ORDER — CLOPIDOGREL BISULFATE 75 MG/1
75 TABLET ORAL DAILY
Qty: 90 TABLET | Refills: 3 | Status: SHIPPED | OUTPATIENT
Start: 2025-05-08 | End: 2026-05-08

## 2025-05-08 RX ORDER — ATORVASTATIN CALCIUM 40 MG/1
40 TABLET, FILM COATED ORAL DAILY
Qty: 90 TABLET | Refills: 3 | Status: SHIPPED | OUTPATIENT
Start: 2025-05-08 | End: 2026-05-08

## 2025-05-08 ASSESSMENT — ENCOUNTER SYMPTOMS
CARDIOVASCULAR NEGATIVE: 1
DEPRESSION: 0
MUSCULOSKELETAL NEGATIVE: 1
RESPIRATORY NEGATIVE: 1
GASTROINTESTINAL NEGATIVE: 1
NEUROLOGICAL NEGATIVE: 1
CONSTITUTIONAL NEGATIVE: 1

## 2025-05-08 ASSESSMENT — PATIENT HEALTH QUESTIONNAIRE - PHQ9
2. FEELING DOWN, DEPRESSED OR HOPELESS: NOT AT ALL
SUM OF ALL RESPONSES TO PHQ9 QUESTIONS 1 AND 2: 0
1. LITTLE INTEREST OR PLEASURE IN DOING THINGS: NOT AT ALL

## 2025-05-08 NOTE — PROGRESS NOTES
"Chief Complaint:   Follow-up    History Of Present Illness:    .Ms Groves returns in follow up.  Denies chest pain, sob, palpitations or pedal edema.  Her bp at home is 150/90 prior to meds and  130-140 with lower diastolic after meds.            Last Recorded Vitals:  Blood pressure 150/76, pulse 67, height 1.575 m (5' 2\"), weight 63 kg (139 lb), SpO2 96%.     Past Medical History:  Past Medical History:   Diagnosis Date    AMI (acute myocardial infarction) (Multi)     Anxiety     ASHD (arteriosclerotic heart disease)     COVID-19     Depression     Diabetes mellitus (Multi)     Gastroparesis     Gastroparesis    GERD (gastroesophageal reflux disease)     Hyperlipidemia     Hypertension     Neuropathy     Other conditions influencing health status     Acute Myocardial Infarction    Other conditions influencing health status     Pulmonary Embolism    Other conditions influencing health status     Total Occlusion Of The Femoral Artery    Other conditions influencing health status     Pulmonary Embolism    Palpitations     Peripheral vascular angioplasty status     Status post angioplasty    Personal history of other diseases of the circulatory system     History of hypertension    Personal history of other diseases of the circulatory system     History of peripheral vascular disease    Personal history of other diseases of the digestive system     History of esophageal reflux    Personal history of other diseases of the nervous system and sense organs     History of cataract    Personal history of other endocrine, nutritional and metabolic disease 01/27/2017    History of diabetic neuropathy    Personal history of other endocrine, nutritional and metabolic disease     History of type 2 diabetes mellitus    Personal history of other specified conditions 06/17/2019    History of urinary frequency    Pneumonia     Pulmonary embolism     PVD (peripheral vascular disease) ([facility])     Sinusitis     Spinal stenosis     " Tachycardia     Unspecified secondary cataract     Secondary cataract    Urinary tract infection         Past Surgical History:  Past Surgical History:   Procedure Laterality Date    CHOLECYSTECTOMY  04/01/2013    Cholecystectomy    CORONARY ANGIOPLASTY  04/01/2013    PTCA    HYSTERECTOMY  1965    MR HEAD ANGIO WO IV CONTRAST  10/15/2014    MR HEAD ANGIO WO IV CONTRAST [facility]    MR HEAD ANGIO WO IV CONTRAST  01/18/2020    MR HEAD ANGIO WO IV CONTRAST [facility]    MR NECK ANGIO WO IV CONTRAST  01/18/2020    MR NECK ANGIO WO IV CONTRAST [facility]    OTHER SURGICAL HISTORY  04/01/2013    Femoral Artery Exploration    OTHER SURGICAL HISTORY  04/01/2013    Endoscopic Retrograde Cholangiopancreatography (ERCP)    OTHER SURGICAL HISTORY  10/30/2018    Cataract Extraction With Insertion Of Intraocular Lens    OTHER SURGICAL HISTORY  10/30/2018    Iridotomy By YAG Laser    OTHER SURGICAL HISTORY  06/24/2020    YAG laser capsulotomy       Social History:  Social History     Socioeconomic History    Marital status:    Tobacco Use    Smoking status: Never     Passive exposure: Never    Smokeless tobacco: Never   Substance and Sexual Activity    Alcohol use: Never    Drug use: Never     Social Drivers of Health     Financial Resource Strain: Low Risk  (10/14/2024)    Overall Financial Resource Strain (CARDIA)     Difficulty of Paying Living Expenses: Not hard at all   Food Insecurity: No Food Insecurity (10/14/2024)    Hunger Vital Sign     Worried About Running Out of Food in the Last Year: Never true     Ran Out of Food in the Last Year: Never true   Transportation Needs: No Transportation Needs (10/14/2024)    PRAPARE - Transportation     Lack of Transportation (Medical): No     Lack of Transportation (Non-Medical): No   Physical Activity: Inactive (10/15/2024)    Exercise Vital Sign     Days of Exercise per Week: 0 days     Minutes of Exercise per Session: 0 min   Stress: No Stress  Concern Present (10/15/2024)    [employer] [place] [employer] - Occupational Stress Questionnaire     Feeling of Stress : Not at all   Social Connections: Moderately Integrated (10/15/2024)    Social Connection and Isolation Panel [NHANES]     Frequency of Communication with Friends and Family: More than three times a week     Frequency of Social Gatherings with Friends and Family: More than three times a week     Attends [employer] Services: More than 4 times per year     Active Member of Clubs or Organizations: Yes     Attends Club or Organization Meetings: More than 4 times per year     Marital Status:    Intimate Partner Violence: Not At Risk (10/14/2024)    Humiliation, Afraid, Rape, and Kick questionnaire     Fear of Current or Ex-Partner: No     Emotionally Abused: No     Physically Abused: No     Sexually Abused: No   Housing Stability: Low Risk  (10/14/2024)    Housing Stability Vital Sign     Unable to Pay for Housing in the Last Year: No     Number of Times Moved in the Last Year: 0     Homeless in the Last Year: No       Family History:  Family History   Problem Relation Name Age of Onset    Anxiety disorder Mother      Heart disease Father      Diabetes Other           Allergies:  Amoxicillin-pot clavulanate and Levofloxacin    Outpatient Medications:  Current Outpatient Medications   Medication Sig Dispense Refill    acetaminophen (Tylenol) 325 mg tablet Take 2 tablets (650 mg) by mouth every 4 hours if needed for mild pain (1 - 3). 30 tablet 0    amLODIPine (Norvasc) 10 mg tablet Take 1 tablet (10 mg) by mouth once daily. 90 tablet 3    aspirin 81 mg chewable tablet Chew 1 tablet (81 mg) once daily.      BIOTIN ORAL Take 1 each by mouth once daily.      blood sugar diagnostic (Blood Glucose Test) strip Test 2-3 time daily for diabetes (E11.9), non-insulin dependent, fill for contour test strips, last A1c 6.4 200 strip 1    blood sugar diagnostic (Contour Next Test Strips) strip Test  2-3 times daily 200 strip 0    cholecalciferol (Vitamin D3) 50 mcg (2,000 unit) capsule Take 1 capsule (50 mcg) by mouth once daily.      fluticasone (Flonase) 50 mcg/actuation nasal spray Administer 2 sprays into each nostril once daily. (Patient taking differently: Administer 2 sprays into each nostril if needed.)      gabapentin (Neurontin) 300 mg capsule Take 1 capsule (300 mg) by mouth 2 times a day. 180 capsule 1    glipiZIDE (Glucotrol) 5 mg tablet Take 1 tablet (5 mg) by mouth once daily. 90 tablet 1    Lactobacillus acidophilus (PROBIOTIC ORAL) Take 1 each by mouth once daily.      lancets (Microlet Lancet) misc Use to test 2-3 daily. 100 each 3    loratadine (Claritin) 10 mg tablet Take 1 tablet (10 mg) by mouth once daily. (Patient taking differently: Take 1 tablet (10 mg) by mouth if needed.) 30 tablet 2    multivit with minerals/lutein (VISION PLUS LUTEIN ORAL) Take 1 each by mouth once daily. Take as directed      ondansetron ODT (Zofran-ODT) 4 mg disintegrating tablet Take 1 tablet (4 mg) by mouth every 8 hours if needed for nausea. 40 tablet 0    pantoprazole (ProtoNix) 40 mg EC tablet Take 1 tablet (40 mg) by mouth once daily in the morning. Take before meals. Do not crush, chew, or split. 90 tablet 1    ursodiol (Actigall) 300 mg capsule Take 1 capsule (300 mg) by mouth 2 times a day. 180 capsule 1    atorvastatin (Lipitor) 40 mg tablet Take 1 tablet (40 mg) by mouth once daily. 90 tablet 3    clopidogrel (Plavix) 75 mg tablet Take 1 tablet (75 mg) by mouth once daily. 90 tablet 3    losartan (Cozaar) 100 mg tablet Take 1 tablet (100 mg) by mouth once daily. 90 tablet 3    melatonin 3 mg tablet Take 1 tablet (3 mg) by mouth once daily at bedtime. (Patient not taking: Reported on 5/8/2025)      metoprolol succinate XL (Toprol-XL) 100 mg 24 hr tablet Take 1 tablet (100 mg) by mouth 2 times a day. 180 tablet 3    mirtazapine (Remeron) 15 mg tablet Take 1 tablet (15 mg) by mouth once daily at bedtime.  (Patient not taking: Reported on 5/8/2025) 90 tablet 0     No current facility-administered medications for this visit.        Physical Exam:  Cardiovascular:      PMI at left midclavicular line. Normal rate. Regular rhythm. Normal S1. Normal S2.       Murmurs: There is no murmur.      No gallop.  No click. No rub.   Pulses:     Intact distal pulses.   Edema:     Peripheral edema absent.       ROS:  Review of Systems   Constitutional: Negative.   Cardiovascular: Negative.    Respiratory: Negative.     Skin: Negative.    Musculoskeletal: Negative.    Gastrointestinal: Negative.    Genitourinary: Negative.    Neurological: Negative.           Last Labs:  CBC -  Lab Results   Component Value Date    WBC 6.2 05/02/2025    HGB 12.3 05/02/2025    HCT 38.1 05/02/2025    MCV 96.5 05/02/2025     05/02/2025       CMP -  Lab Results   Component Value Date    CALCIUM 9.8 05/02/2025    PHOS 3.5 03/03/2024    PROT 6.9 05/02/2025    ALBUMIN 4.3 05/02/2025    AST 26 05/02/2025    ALT 13 05/02/2025    ALKPHOS 150 05/02/2025    BILITOT 0.4 05/02/2025       LIPID PANEL -   Lab Results   Component Value Date    CHOL 144 05/02/2025    TRIG 129 05/02/2025    HDL 53 05/02/2025    CHHDL 2.7 05/02/2025    LDLF 70 06/28/2023    VLDL 26 01/18/2025    NHDL 91 05/02/2025       RENAL FUNCTION PANEL -   Lab Results   Component Value Date    GLUCOSE 104 (H) 05/02/2025     05/02/2025    K 5.6 (H) 05/02/2025     05/02/2025    CO2 20 05/02/2025    ANIONGAP 11 05/02/2025    BUN 31 (H) 05/02/2025    CREATININE 1.62 (H) 05/02/2025    CALCIUM 9.8 05/02/2025    PHOS 3.5 03/03/2024    ALBUMIN 4.3 05/02/2025        Lab Results   Component Value Date     (H) 10/14/2024    HGBA1C 6.5 (H) 05/02/2025         Assessment/Plan   Problem List Items Addressed This Visit          Cardiac and Vasculature    Arteriosclerotic cardiovascular disease    Relevant Medications    losartan (Cozaar) 100 mg tablet    metoprolol succinate XL (Toprol-XL)  100 mg 24 hr tablet    atorvastatin (Lipitor) 40 mg tablet       Infectious Diseases    COVID-19    Relevant Medications    clopidogrel (Plavix) 75 mg tablet       1 CAD with anteroseptal MI 01/19/1995.     2. Status post PCI to high-grade mid LAD stenosis 04/19/1995. Patient is without any anginal symptoms. She did have an IV pharmacological nuclear stress test on 04/19/2013. That was negative for evidence of ischemia. The patient did have a repeat cardiac catheter performed parenthetically in 12/ 2008, with results as noted. More recently the patient was admitted to [facility] on 6/6/2014 with recurrent diverticulitis along with some intermittent chest tightness/discomfort. As such she did have a pharmacological nuclear stress test performed on 6/9/2014 which was negative for evidence of ischemia. Patient was admitted to [facility] [place] on 11/3/2014 for chest pain and hypertension. She has not had any recurrent chest pain. ECG done prior shows NSR. Patient had negative pharmacological nuclear stress test/lexiscan done 10/15/2024.     3. Status post a right groin exploration and repair of right femoral artery occlusion following cardiac cath 12/2008.     4. Hyperlipidemia. The patient will continue atorvastatin 40 mg daily. Recent lipid panel from 12/9/2020 includes cholesterol 158 LDL 77 HDL 52 triglyceride 143. The SMA panel was normal except creatinine 1.37. Glycohemoglobin was 6.1%.  Lipid panel from 5/2/2025 remains satisfactory cholesterol 144 LDL 70 HDL 53 triglyceride 129.     5. Hypertension. The patient's blood pressure is slightly elevated today. The patient has a considerable degree of anxiety with respect to her blood pressure. Will continue amlodipine 10 mg daily, losartan 100 mg daily and metoprolol  mg daily unchanged for now.  Blood pressure is acceptable at present current dosages of the amlodipine and losartan with the metoprolol ER having been increased to 100 mg twice daily  during recent admission in 3/2024 for COVID-19 at which time she was somewhat tachycardic.  The systolic blood pressure is actually in the lower range of normal and will reduce the dose of amlodipine from 10 to 5 mg daily.  Blood pressure at time of office visit 5/8/2025 is acceptable.  Lab work from 1/18/2025 included a CMP with creatinine 1.26 potassium of 4.9.  Repeat lab work from 5/2/2025 notable for normal CBC with SMA panel including potassium of 5.6 creatinine of 1.62.  Patient denies any lightheadedness.  Will decrease losartan dose from 100 mg daily to 50 mg daily and recheck BMP today to reassess her CKD and potassium readings.     6. Type 2 diabetes. Diabetic control has been satisfactory. Her recent glycohemoglobin was 6.1% on 12/9/2020.  Patient remains on usual glipizide 5 mg daily.  She did receive insulin temporarily when hospitalized.  Lab work from 5/2/2025 included a glycohemoglobin of 6.5%.     7. Laparoscopic cholecystectomy with multiple postoperative ERCPs 7/2006.     8. History of pancreas divisum.     9. Hyperplastic colonic polyps.     10. Lumbosacral spinal DJD with lumbar radiculopathy.     11. History of DJD.     12. History of Gamble's neuroma, left foot.     13. History of pulmonary embolism, left lower lobe.     14, former Coumadin anticoagulation.     15. Gastroparesis.     16. GERD.     17. History of splenic artery aneurysm. The patient had a repeat CT angiogram on 10/29/2013 which demonstrated that the splenic artery aneurysm was unchanged in size.     18. History of reactive depression.     19. History of syncope with head trauma and tiny intracranial subarachnoid hemorrhage. Please see previous office notes for review of the day of 5/31. The patient had gotten up from the couch to use the bathroom when she became woozy lightheaded and fell, striking her head. She was taken [facility]. Head CT showed a left parietal scalp hematoma, along with a small 2 x 7 mm focus of  increased attenuation within the left frontal sulcus consistent with a small amount of subarachnoid hemorrhage or petechial hemorrhage of adjacent cortex with no mass effect. The patient was transferred to [facility] observed for 2 days. Repeat CT scanning was done during that admission. It was thought that she might have had orthostatic hypotension as the cause of her fall and as noted. Her previously prescribed imdur was stopped. Patient has not had any recurrent syncopal episodes.      20. Status post partial colectomy for diverticulitis 6/16/2014 with postoperative C. difficile colitis. This patient was admitted to [facility] on 6/6/2014 with acute diverticulitis. She ultimately underwent a partial colectomy on 6/16. She was hospitalized for a period of one week afterwards and then sent to [facility] for 2 weeks rehabilitation. She was then readmitted to [facility] because of C. difficile colitis and was hospitalized for another 2 weeks. She was sent to [place] for rehabilitation for one week.      21. Hernia repair 2/2015 with Dr. Rodriguez at [facility].      22. Carotid vascular disease.     23. Palpitations. Hospital stay 09/2018 at [facility]. Was on metoprolol succinate 200 mg, but has decreased it to 100 mg without return of symptoms.      24. TIA. Had hospital stay at [facility] 01/17/2020 for suspected TIA. Had vision changes and buzzing in the head which have almost completely resolved. CT of brain was negative. MRI of brain was negative, but showed mastoiditis. She will schedule to see ENT. Will continue plavix. Is off asa.      25. Hx of covid-19 vaccine #1 and #2.  The patient was admitted to [facility] on 3/3/2024 - 3/8/2024 with COVID-19.  During that admission she was noted to have a sinus tachycardia.  Echocardiogram demonstrated an LV ejection fraction 60% 1-2+ mitral valve regurgitation 1+ tricuspid valve regurgitation aortic valve sclerosis.  Her  metoprolol was increased to 100 mg twice daily.    26.  CKD

## 2025-05-09 LAB
ANION GAP SERPL CALCULATED.4IONS-SCNC: 12 MMOL/L (CALC) (ref 7–17)
BUN SERPL-MCNC: 38 MG/DL (ref 7–25)
BUN/CREAT SERPL: 28 (CALC) (ref 6–22)
CALCIUM SERPL-MCNC: 9.6 MG/DL (ref 8.6–10.4)
CHLORIDE SERPL-SCNC: 107 MMOL/L (ref 98–110)
CO2 SERPL-SCNC: 19 MMOL/L (ref 20–32)
CREAT SERPL-MCNC: 1.34 MG/DL (ref 0.6–0.95)
EGFRCR SERPLBLD CKD-EPI 2021: 36 ML/MIN/1.73M2
GLUCOSE SERPL-MCNC: 113 MG/DL (ref 65–99)
POTASSIUM SERPL-SCNC: 5.4 MMOL/L (ref 3.5–5.3)
SODIUM SERPL-SCNC: 138 MMOL/L (ref 135–146)

## 2025-05-10 ENCOUNTER — APPOINTMENT (OUTPATIENT)
Dept: PRIMARY CARE | Facility: CLINIC | Age: OVER 89
End: 2025-05-10
Payer: MEDICARE

## 2025-05-10 VITALS
WEIGHT: 139 LBS | BODY MASS INDEX: 25.58 KG/M2 | SYSTOLIC BLOOD PRESSURE: 124 MMHG | DIASTOLIC BLOOD PRESSURE: 64 MMHG | HEIGHT: 62 IN

## 2025-05-10 DIAGNOSIS — M25.562 ACUTE PAIN OF BOTH KNEES: ICD-10-CM

## 2025-05-10 DIAGNOSIS — K80.50 CHOLEDOCHOLITHIASIS: ICD-10-CM

## 2025-05-10 DIAGNOSIS — N17.9 AKI (ACUTE KIDNEY INJURY): ICD-10-CM

## 2025-05-10 DIAGNOSIS — R30.9 URINARY PAIN: ICD-10-CM

## 2025-05-10 DIAGNOSIS — M25.561 ACUTE PAIN OF BOTH KNEES: ICD-10-CM

## 2025-05-10 DIAGNOSIS — Z00.00 MEDICARE ANNUAL WELLNESS VISIT, INITIAL: Primary | ICD-10-CM

## 2025-05-10 DIAGNOSIS — E11.9 CONTROLLED TYPE 2 DIABETES MELLITUS WITHOUT COMPLICATION, WITHOUT LONG-TERM CURRENT USE OF INSULIN: ICD-10-CM

## 2025-05-10 DIAGNOSIS — R11.0 NAUSEA: ICD-10-CM

## 2025-05-10 PROBLEM — J18.9 RECURRENT PNEUMONIA: Status: ACTIVE | Noted: 2024-03-08

## 2025-05-10 PROBLEM — M51.06 INTERVERTEBRAL DISC DISORDER OF LUMBAR REGION WITH MYELOPATHY: Status: ACTIVE | Noted: 2024-03-08

## 2025-05-10 PROBLEM — I73.9 PERIPHERAL VASCULAR DISEASE: Status: ACTIVE | Noted: 2024-03-08

## 2025-05-10 PROBLEM — Z90.89 ACQUIRED ABSENCE OF ORGAN: Status: ACTIVE | Noted: 2024-03-08

## 2025-05-10 LAB
POC BILIRUBIN, URINE: NEGATIVE
POC BLOOD, URINE: NEGATIVE
POC GLUCOSE, URINE: NEGATIVE MG/DL
POC KETONES, URINE: NEGATIVE MG/DL
POC LEUKOCYTES, URINE: ABNORMAL
POC NITRITE,URINE: NEGATIVE
POC PH, URINE: 6 PH
POC PROTEIN, URINE: ABNORMAL MG/DL
POC SPECIFIC GRAVITY, URINE: 1.02
POC UROBILINOGEN, URINE: 0.2 EU/DL

## 2025-05-10 RX ORDER — URSODIOL 300 MG/1
300 CAPSULE ORAL 2 TIMES DAILY
Qty: 180 CAPSULE | Refills: 1 | Status: SHIPPED | OUTPATIENT
Start: 2025-05-10

## 2025-05-10 RX ORDER — PANTOPRAZOLE SODIUM 40 MG/1
40 TABLET, DELAYED RELEASE ORAL
Qty: 90 TABLET | Refills: 0 | Status: SHIPPED | OUTPATIENT
Start: 2025-05-10

## 2025-05-10 RX ORDER — GLIPIZIDE 5 MG/1
5 TABLET ORAL DAILY
Qty: 90 TABLET | Refills: 0 | Status: SHIPPED | OUTPATIENT
Start: 2025-05-10

## 2025-05-10 RX ORDER — GABAPENTIN 300 MG/1
300 CAPSULE ORAL 2 TIMES DAILY
Qty: 180 CAPSULE | Refills: 0 | Status: SHIPPED | OUTPATIENT
Start: 2025-05-10

## 2025-05-10 ASSESSMENT — ENCOUNTER SYMPTOMS
DEPRESSION: 0
LOSS OF SENSATION IN FEET: 0
OCCASIONAL FEELINGS OF UNSTEADINESS: 0

## 2025-05-10 ASSESSMENT — ACTIVITIES OF DAILY LIVING (ADL)
MANAGING_FINANCES: INDEPENDENT
DOING_HOUSEWORK: INDEPENDENT
GROCERY_SHOPPING: INDEPENDENT
TAKING_MEDICATION: INDEPENDENT
BATHING: INDEPENDENT
DRESSING: INDEPENDENT

## 2025-05-10 NOTE — PROGRESS NOTES
Subjective   Patient ID: Catie Groves is a 89 y.o. female who presents for Medicare Annual Wellness Visit Initial.    Patient is here for Medicare wellness exam  Here for follow-up on blood work  Patient was taking Azo every day for past few months  Last week she stopped when she found her kidney function worse  Overall she is feeling fine otherwise  Follow-up on diabetes hypertension high cholesterol coronary artery disease  Needs medication refill     patient is here with complaints of feeling sick.  Having sinus congestion postnasal drainage chest congestion     Past recap  Patient presents with complaints of having left breast pain.  She is concerned about her heart  Also concerned if she is having UTI    Patient is here for follow-up.  She is overall feeling better.  She is drinking more water.    Wants to go back on Remeron having little anxiety issues  Denies any abdominal pain    Patient is here for hospital follow-up.  Admitted for chest pain and shortness of breath and elevated blood pressure  Her stress test was negative.  Dose of Norvasc increased follow-up elevated blood pressure  Patient is just feeling little headache and he is little anxious  She does not have abdominal pain but she still feels a fullness in the abdomen  Follow-up blood work on diabetes cholesterol  Her knees were hurting in the hospital but they are doing better since she is moving around more    Patient presents with complaints of having urinary burning and frequency and dark urine in the morning for last couple of days.  Denies any fever or chills  Denies any back pain, feeling tired    Patient is here with complaints of having shoulder pain back pain generalized achiness not feeling good.  Not sure if she is having anxiety or having something wrong     patient is here for follow-up  Follow-up on hypertension high cholesterol diabetes coronary artery disease  Needs refills  Last week right knee started hurting    Past recap    patient is here for hospital follow-up and rehab follow-up.  She was admitted for COVID and dehydration  She is still feeling tired and has no ambition  Home care is still active and she is getting therapy   patient is here because she continues to have pain and funny sensation in the left lower quadrant   Patient is wants her kidneys checked she is worried about the kidneys.  She is also having pain in the right knee having discomfort and having discomfort when walking  She got nerve block from Dr. Quesada for lumbar radiculopathy      Past recap  patient is here for routine follow-up  Her abdominal pain is doing better  She went to the GI and had colonoscopy done which was unremarkable.  Her pain went away by itself   Needs medication refill  Follow-up on hypertension diabetes high cholesterol did blood work     patient is here still having abdominal bloating still having left lower quadrant discomfort and very worried that something is wrong   She is drinking enough water  If you are in the morning but during the day does not pee as much     patient is here because she is still having off-and-on abdominal pain and feels very distended not having good bowel movement in spite of taking lactulose     Patient is here with complaints of having abdominal pain.  Last Wednesday went to the ER urine grew Klebsiella pneumonia treated for UTI  2 weeks ago was very constipated stomach was hurting all over no still gets distended and not having good bowel movement  She is on gabapentin by Dr. quesada  Her back also hurts quite a bit      patient is here for follow-up  Follow-up on coronary artery disease hypertension high cholesterol anxiety  Did blood work  Needs medications  Patient got II nerve blocks back pain is doing little better but she has really suffered from the back pain.  Steroid only helped temporarily      Blood pressure  Needs medication refill  Shingles pain is doing better  Follow-up on coronary artery  "disease hypertension high cholesterol  Anxiety is doing better sometimes she takes tramadol  Wants nausea medication for occasional use  Wants handicap sticker  Shingles vaccine she is due  Overall doing well        Patient is complaining of having right upper quadrant pain and soreness having loose stools cholestyramine is not agreeing  She is worried that her GI issues are flaring up  She has history of common bile duct stones and irritable bowel disease  She also has history of diverticulitis C. difficile colitis     patient went to see the GI for the abdominal discomfort she was having she had MRI done CAT scans and ultrasound done everything came back normal  She did blood work for cholesterol diabetes  Here for follow-up and needs refills on medications  Overall she is doing great  Wants prescription for Zofran for occasional use      patient fell and broke her nose has a lot of bruises. On Wednesday she was walking in the parking lot to get groceries and she fell on her face  Now she is having pain in the right elbow she did not get x-ray of the elbow done concerned if she has broken the bone  She has sutures on the chin     Patient here for follow-up on hospital stay  Was admitted for TIA symptoms started on Plavix  Her blood pressure was very high last evening she is concerned if Plavix is making her blood pressure goal  She called me early morning hours around 5 spoke to her and after that she felt comfortable and slept  blood pressure is better now   She is also concerned about findings on the mastoid in MRI  Still feels sinuses to be congested       Objective   /64   Ht 1.575 m (5' 2\")   Wt 63 kg (139 lb)   BMI 25.42 kg/m²     Physical Exam  Vitals reviewed.   Constitutional:       Appearance: Normal appearance.   HENT:      Head: Normocephalic and atraumatic.      Right Ear: Tympanic membrane, ear canal and external ear normal.      Left Ear: Tympanic membrane, ear canal and external ear " normal.      Nose: Congestion and rhinorrhea present.      Mouth/Throat:      Pharynx: Oropharynx is clear.   Eyes:      Extraocular Movements: Extraocular movements intact.      Conjunctiva/sclera: Conjunctivae normal.      Pupils: Pupils are equal, round, and reactive to light.   Cardiovascular:      Rate and Rhythm: Normal rate and regular rhythm.      Pulses: Normal pulses.      Heart sounds: Normal heart sounds.   Pulmonary:      Effort: Pulmonary effort is normal.      Breath sounds: Normal breath sounds.   Abdominal:      General: Abdomen is flat. Bowel sounds are normal.      Palpations: Abdomen is soft.   Musculoskeletal:      Cervical back: Normal range of motion and neck supple.   Skin:     General: Skin is warm and dry.   Neurological:      General: No focal deficit present.      Mental Status: She is alert and oriented to person, place, and time.   Psychiatric:         Mood and Affect: Mood normal.       Assessment/Plan   Problem List Items Addressed This Visit          Gastrointestinal and Abdominal    Choledocholithiasis       Musculoskeletal and Injuries    Knee pain     Other Visit Diagnoses         Controlled type 2 diabetes mellitus without complication, without long-term current use of insulin          Nausea                past recap  Patient symptoms could be related to gastroparesis  Patient quit taking Reglan  We we will try Reglan again  Try sucralfate to help with the acid reflux  She needs Plavix for her coronary artery disease and I do not think symptoms are caused by Plavix  Increase fiber in the diet  Follow-up if not better     10/21/22   Blood work results reviewed  Cholesterol well controlled  Sugar well controlled  Blood pressure stable  Patient had follow-up with a cardiologist coronary artery disease stable  Advised patient to take Metamucil to help with the loose stool  Explained that not safe to give standing order for antibiotics  Refer to physical therapy for lower back pain  and hip pain  Medications refilled  Follow-up in 6 months     4/21/2023  Blood pressure stable  Creatinine has gone up to 1.69 BUN 42  Encourage patient to drink more water  Cut down salt intake if not better will do ultrasound kidneys and recheck blood work in 3 months  A1c has gone up to 7.6 patient has been really well controlled on diabetes all along  Steroids must be doing it  We will recheck and see if needs medication adjustment  Patient wants to wait until next blood work  Cholesterol is okay  Follow-up blood work in 3 months     7/31/2023  ER records reviewed  BUN 23 creatinine 1.5 potassium 5.1  Last blood work here showed elevated creatinine and potassium was high but at that time patient says she was getting nerve blocks which made her kidneys work well  We will check UA C&S  Try lactulose for constipation  Medrol Dosepak for the back pain  Follow-up if not better     8/7/2023  Patient has distended abdomen  Stat CT of the abdomen pelvis done without contrast because of compromised kidney function  No acute pathology found  Advised patient to take lactulose twice a day or 3 times a day to see if it helps to improve and regularize her bowel movements  Follow-up if not better            8/24/2023  All blood work has been negative  CAT scan has been negative  We will do the ultrasound of the abdomen to make sure there is no urinary retention causing the symptoms  Refer to GI if ultrasound is negative    10/20/2023  Blood pressure stable  Cholesterol well controlled  Diabetes under control  Ursodiol given for bile duct/  Coronary artery disease stable patient to care of cardiologist  Medications refilled  Follow-up in 6 months      3/29/2024  Hospital records reviewed  Patient's blood sugars were running low  Also she was little anemic  Because of COVID probably she is still feeling tired needs to recover  Will check blood work again CBC CMP for B12 and iron  Advised to hold off diabetic medication if blood  sugars are running low  Medications refilled  Home care orders done  Routine follow-up and follow-up as needed if not feeling better  Will call her with the results     7/19/2024  Patient does have arthritis in the knee  Lidoderm patch  Voltaren gel  Gabapentin to help with the pain  Refills given on ursodiol and glipizide  Blood work reviewed A1c and cholesterol is not done  Will do blood work in 3 months    8/23/2024  EKG done shows normal sinus rhythm chest x-ray done which is unremarkable  UA done which is unremarkable  Patient could be having symptoms from anxiety  Advised to take anxiety medication  Patient has a follow-up with the cardiologist  Advised to go to the emergency room if symptoms gets worse    9/10/2024  Urine analysis shows UTI  Will send it for culture  Keflex for 10 days  Increase water intake  Follow-up if not better    10/19/2024  Hospital chart reviewed again  Stress test normal  Patient's blood pressure is fine  She is taking Norvasc 10 mg a day.  She was on it this dose in the past  She has follow-up with the cardiologist  She got the dye and her creatinine was slightly elevated  Will check her blood work  Diabetes well-controlled  Cholesterol well-controlled  Lipase was elevated  But it came down  Wondering if symptoms were related to her issues with sludge  Clinically patient is doing much better now  Will recheck in 3 months    1/24/2025  Blood work looks very good kidney function improving  Cholesterol well-controlled  Blood pressure stable  Alk phos slightly elevated but patient is asymptomatic  Restart Remeron  Follow-up blood work in 3 months    2/26/2025  EKG done shows normal sinus rhythm  Reviewed them negative  Will order diagnostic mammogram  Patient appears to be quite anxious    3/18/2025  Treat with Z-Baldemar  Steam saline gargles rest fluids  Follow-up if not better    5/20/2025  Medicare wellness exam done  Mini-Mental status examination done  Recall 2 out of 3  Serial 7  perfect spelling backwards perfect  Patient is excellent for her age  Blood work reviewed  Patient has SARA  BMP repeated by cardiologist and losartan dose reduced in half  Potassium has improved to 5.4 BUN and creatinine is also improving  Patient was taking Azo every day for past few months.  Stop Azo it can cause kidney damage  Increase water intake  Check BMP in 1 week  Ultrasound kidney  Check UA does not show infection but will send it for culture  Refer to nephrologist  Blood sugar okay  Cholesterol okay  Continue current medications  Will do routine blood work this time in 3 months

## 2025-05-13 LAB — BACTERIA UR CULT: ABNORMAL

## 2025-05-14 ENCOUNTER — HOSPITAL ENCOUNTER (OUTPATIENT)
Dept: RADIOLOGY | Facility: HOSPITAL | Age: OVER 89
Discharge: HOME | End: 2025-05-14
Payer: MEDICARE

## 2025-05-14 DIAGNOSIS — R30.9 URINARY PAIN: ICD-10-CM

## 2025-05-14 DIAGNOSIS — N17.9 AKI (ACUTE KIDNEY INJURY): ICD-10-CM

## 2025-05-14 LAB
ANION GAP SERPL CALCULATED.4IONS-SCNC: 11 MMOL/L (CALC) (ref 7–17)
BUN SERPL-MCNC: 35 MG/DL (ref 7–25)
BUN/CREAT SERPL: 26 (CALC) (ref 6–22)
CALCIUM SERPL-MCNC: 9.9 MG/DL (ref 8.6–10.4)
CHLORIDE SERPL-SCNC: 111 MMOL/L (ref 98–110)
CO2 SERPL-SCNC: 20 MMOL/L (ref 20–32)
CREAT SERPL-MCNC: 1.34 MG/DL (ref 0.6–0.95)
EGFRCR SERPLBLD CKD-EPI 2021: 36 ML/MIN/1.73M2
GLUCOSE SERPL-MCNC: 114 MG/DL (ref 65–99)
POTASSIUM SERPL-SCNC: 4.9 MMOL/L (ref 3.5–5.3)
SODIUM SERPL-SCNC: 142 MMOL/L (ref 135–146)

## 2025-05-14 PROCEDURE — 76770 US EXAM ABDO BACK WALL COMP: CPT | Performed by: RADIOLOGY

## 2025-05-14 PROCEDURE — 76770 US EXAM ABDO BACK WALL COMP: CPT

## 2025-05-17 DIAGNOSIS — N17.9 AKI (ACUTE KIDNEY INJURY): ICD-10-CM

## 2025-05-19 DIAGNOSIS — R30.9 URINARY PAIN: ICD-10-CM

## 2025-05-19 DIAGNOSIS — E78.00 HYPERCHOLESTEROLEMIA: ICD-10-CM

## 2025-05-19 DIAGNOSIS — E78.5 HYPERLIPIDEMIA, UNSPECIFIED HYPERLIPIDEMIA TYPE: ICD-10-CM

## 2025-05-28 LAB
ANION GAP SERPL CALCULATED.4IONS-SCNC: 9 MMOL/L (CALC) (ref 7–17)
BUN SERPL-MCNC: 33 MG/DL (ref 7–25)
BUN/CREAT SERPL: 26 (CALC) (ref 6–22)
CALCIUM SERPL-MCNC: 9.7 MG/DL (ref 8.6–10.4)
CHLORIDE SERPL-SCNC: 108 MMOL/L (ref 98–110)
CO2 SERPL-SCNC: 22 MMOL/L (ref 20–32)
CREAT SERPL-MCNC: 1.29 MG/DL (ref 0.6–0.95)
EGFRCR SERPLBLD CKD-EPI 2021: 38 ML/MIN/1.73M2
GLUCOSE SERPL-MCNC: 112 MG/DL (ref 65–99)
POTASSIUM SERPL-SCNC: 5.2 MMOL/L (ref 3.5–5.3)
SODIUM SERPL-SCNC: 139 MMOL/L (ref 135–146)

## 2025-05-30 LAB
APPEARANCE UR: CLEAR
BACTERIA #/AREA URNS HPF: ABNORMAL /HPF
BACTERIA UR CULT: ABNORMAL
BACTERIA UR CULT: ABNORMAL
BILIRUB UR QL STRIP: NEGATIVE
COLOR UR: YELLOW
GLUCOSE UR QL STRIP: NEGATIVE
HGB UR QL STRIP: NEGATIVE
HYALINE CASTS #/AREA URNS LPF: ABNORMAL /LPF
KETONES UR QL STRIP: NEGATIVE
LEUKOCYTE ESTERASE UR QL STRIP: ABNORMAL
NITRITE UR QL STRIP: NEGATIVE
PH UR STRIP: 5.5 [PH] (ref 5–8)
PROT UR QL STRIP: NEGATIVE
RBC #/AREA URNS HPF: ABNORMAL /HPF
SERVICE CMNT-IMP: ABNORMAL
SP GR UR STRIP: 1.01 (ref 1–1.03)
SQUAMOUS #/AREA URNS HPF: ABNORMAL /HPF
WBC #/AREA URNS HPF: ABNORMAL /HPF

## 2025-05-31 ENCOUNTER — APPOINTMENT (OUTPATIENT)
Dept: PRIMARY CARE | Facility: CLINIC | Age: OVER 89
End: 2025-05-31
Payer: MEDICARE

## 2025-05-31 VITALS
BODY MASS INDEX: 25.95 KG/M2 | WEIGHT: 141 LBS | HEIGHT: 62 IN | SYSTOLIC BLOOD PRESSURE: 138 MMHG | DIASTOLIC BLOOD PRESSURE: 68 MMHG

## 2025-05-31 DIAGNOSIS — E78.5 HYPERLIPIDEMIA, UNSPECIFIED HYPERLIPIDEMIA TYPE: ICD-10-CM

## 2025-05-31 DIAGNOSIS — E11.9 TYPE 2 DIABETES MELLITUS WITHOUT RETINOPATHY (MULTI): ICD-10-CM

## 2025-05-31 DIAGNOSIS — I10 BENIGN ESSENTIAL HTN: ICD-10-CM

## 2025-05-31 DIAGNOSIS — N39.0 ACUTE URINARY TRACT INFECTION: Primary | ICD-10-CM

## 2025-05-31 DIAGNOSIS — N28.1 RENAL CYST: ICD-10-CM

## 2025-05-31 PROCEDURE — 3078F DIAST BP <80 MM HG: CPT | Performed by: INTERNAL MEDICINE

## 2025-05-31 PROCEDURE — 1036F TOBACCO NON-USER: CPT | Performed by: INTERNAL MEDICINE

## 2025-05-31 PROCEDURE — 99214 OFFICE O/P EST MOD 30 MIN: CPT | Performed by: INTERNAL MEDICINE

## 2025-05-31 PROCEDURE — 1159F MED LIST DOCD IN RCRD: CPT | Performed by: INTERNAL MEDICINE

## 2025-05-31 PROCEDURE — 3075F SYST BP GE 130 - 139MM HG: CPT | Performed by: INTERNAL MEDICINE

## 2025-05-31 RX ORDER — CEFUROXIME AXETIL 500 MG/1
500 TABLET ORAL 2 TIMES DAILY
Qty: 20 TABLET | Refills: 0 | Status: SHIPPED | OUTPATIENT
Start: 2025-05-31 | End: 2025-06-10

## 2025-05-31 NOTE — PROGRESS NOTES
Subjective   Patient ID: TELMA Groves is a 89 y.o. female who presents for Follow-up.    Patient is here for follow-up on blood work follow-up on ultrasound of kidney and follow-up on urine test  Patient is drinking more water  She stopped taking Azo    Past recap   patient is here for Medicare wellness exam  Here for follow-up on blood work  Patient was taking Azo every day for past few months  Last week she stopped when she found her kidney function worse  Overall she is feeling fine otherwise  Follow-up on diabetes hypertension high cholesterol coronary artery disease  Needs medication refill     patient is here with complaints of feeling sick.  Having sinus congestion postnasal drainage chest congestion     Past recap  Patient presents with complaints of having left breast pain.  She is concerned about her heart  Also concerned if she is having UTI    Patient is here for follow-up.  She is overall feeling better.  She is drinking more water.    Wants to go back on Remeron having little anxiety issues  Denies any abdominal pain    Patient is here for hospital follow-up.  Admitted for chest pain and shortness of breath and elevated blood pressure  Her stress test was negative.  Dose of Norvasc increased follow-up elevated blood pressure  Patient is just feeling little headache and he is little anxious  She does not have abdominal pain but she still feels a fullness in the abdomen  Follow-up blood work on diabetes cholesterol  Her knees were hurting in the hospital but they are doing better since she is moving around more    Patient presents with complaints of having urinary burning and frequency and dark urine in the morning for last couple of days.  Denies any fever or chills  Denies any back pain, feeling tired    Patient is here with complaints of having shoulder pain back pain generalized achiness not feeling good.  Not sure if she is having anxiety or having something wrong     patient is here for  follow-up  Follow-up on hypertension high cholesterol diabetes coronary artery disease  Needs refills  Last week right knee started hurting    Past recap   patient is here for hospital follow-up and rehab follow-up.  She was admitted for COVID and dehydration  She is still feeling tired and has no ambition  Home care is still active and she is getting therapy   patient is here because she continues to have pain and funny sensation in the left lower quadrant   Patient is wants her kidneys checked she is worried about the kidneys.  She is also having pain in the right knee having discomfort and having discomfort when walking  She got nerve block from Dr. Quesada for lumbar radiculopathy      Past recap  patient is here for routine follow-up  Her abdominal pain is doing better  She went to the GI and had colonoscopy done which was unremarkable.  Her pain went away by itself   Needs medication refill  Follow-up on hypertension diabetes high cholesterol did blood work     patient is here still having abdominal bloating still having left lower quadrant discomfort and very worried that something is wrong   She is drinking enough water  If you are in the morning but during the day does not pee as much     patient is here because she is still having off-and-on abdominal pain and feels very distended not having good bowel movement in spite of taking lactulose     Patient is here with complaints of having abdominal pain.  Last Wednesday went to the ER urine grew Klebsiella pneumonia treated for UTI  2 weeks ago was very constipated stomach was hurting all over no still gets distended and not having good bowel movement  She is on gabapentin by Dr. quesada  Her back also hurts quite a bit      patient is here for follow-up  Follow-up on coronary artery disease hypertension high cholesterol anxiety  Did blood work  Needs medications  Patient got II nerve blocks back pain is doing little better but she has really suffered from the  "back pain.  Steroid only helped temporarily      Blood pressure  Needs medication refill  Shingles pain is doing better  Follow-up on coronary artery disease hypertension high cholesterol  Anxiety is doing better sometimes she takes tramadol  Wants nausea medication for occasional use  Wants handicap sticker  Shingles vaccine she is due  Overall doing well        Patient is complaining of having right upper quadrant pain and soreness having loose stools cholestyramine is not agreeing  She is worried that her GI issues are flaring up  She has history of common bile duct stones and irritable bowel disease  She also has history of diverticulitis C. difficile colitis     patient went to see the GI for the abdominal discomfort she was having she had MRI done CAT scans and ultrasound done everything came back normal  She did blood work for cholesterol diabetes  Here for follow-up and needs refills on medications  Overall she is doing great  Wants prescription for Zofran for occasional use      patient fell and broke her nose has a lot of bruises. On Wednesday she was walking in the parking lot to get groceries and she fell on her face  Now she is having pain in the right elbow she did not get x-ray of the elbow done concerned if she has broken the bone  She has sutures on the chin     Patient here for follow-up on hospital stay  Was admitted for TIA symptoms started on Plavix  Her blood pressure was very high last evening she is concerned if Plavix is making her blood pressure goal  She called me early morning hours around 5 spoke to her and after that she felt comfortable and slept  blood pressure is better now   She is also concerned about findings on the mastoid in MRI  Still feels sinuses to be congested       Objective   /68   Ht 1.575 m (5' 2\")   Wt 64 kg (141 lb)   BMI 25.79 kg/m²     Physical Exam  Vitals reviewed.   Constitutional:       Appearance: Normal appearance.   HENT:      Head: Normocephalic and " atraumatic.      Right Ear: Tympanic membrane, ear canal and external ear normal.      Left Ear: Tympanic membrane, ear canal and external ear normal.      Nose: Congestion and rhinorrhea present.      Mouth/Throat:      Pharynx: Oropharynx is clear.   Eyes:      Extraocular Movements: Extraocular movements intact.      Conjunctiva/sclera: Conjunctivae normal.      Pupils: Pupils are equal, round, and reactive to light.   Cardiovascular:      Rate and Rhythm: Normal rate and regular rhythm.      Pulses: Normal pulses.      Heart sounds: Normal heart sounds.   Pulmonary:      Effort: Pulmonary effort is normal.      Breath sounds: Normal breath sounds.   Abdominal:      General: Abdomen is flat. Bowel sounds are normal.      Palpations: Abdomen is soft.   Musculoskeletal:      Cervical back: Normal range of motion and neck supple.   Skin:     General: Skin is warm and dry.   Neurological:      General: No focal deficit present.      Mental Status: She is alert and oriented to person, place, and time.   Psychiatric:         Mood and Affect: Mood normal.       Assessment/Plan   Problem List Items Addressed This Visit    None        past recap  Patient symptoms could be related to gastroparesis  Patient quit taking Reglan  We we will try Reglan again  Try sucralfate to help with the acid reflux  She needs Plavix for her coronary artery disease and I do not think symptoms are caused by Plavix  Increase fiber in the diet  Follow-up if not better     10/21/22   Blood work results reviewed  Cholesterol well controlled  Sugar well controlled  Blood pressure stable  Patient had follow-up with a cardiologist coronary artery disease stable  Advised patient to take Metamucil to help with the loose stool  Explained that not safe to give standing order for antibiotics  Refer to physical therapy for lower back pain and hip pain  Medications refilled  Follow-up in 6 months     4/21/2023  Blood pressure stable  Creatinine has gone up  to 1.69 BUN 42  Encourage patient to drink more water  Cut down salt intake if not better will do ultrasound kidneys and recheck blood work in 3 months  A1c has gone up to 7.6 patient has been really well controlled on diabetes all along  Steroids must be doing it  We will recheck and see if needs medication adjustment  Patient wants to wait until next blood work  Cholesterol is okay  Follow-up blood work in 3 months     7/31/2023  ER records reviewed  BUN 23 creatinine 1.5 potassium 5.1  Last blood work here showed elevated creatinine and potassium was high but at that time patient says she was getting nerve blocks which made her kidneys work well  We will check UA C&S  Try lactulose for constipation  Medrol Dosepak for the back pain  Follow-up if not better     8/7/2023  Patient has distended abdomen  Stat CT of the abdomen pelvis done without contrast because of compromised kidney function  No acute pathology found  Advised patient to take lactulose twice a day or 3 times a day to see if it helps to improve and regularize her bowel movements  Follow-up if not better            8/24/2023  All blood work has been negative  CAT scan has been negative  We will do the ultrasound of the abdomen to make sure there is no urinary retention causing the symptoms  Refer to GI if ultrasound is negative    10/20/2023  Blood pressure stable  Cholesterol well controlled  Diabetes under control  Ursodiol given for bile duct/  Coronary artery disease stable patient to care of cardiologist  Medications refilled  Follow-up in 6 months      3/29/2024  Hospital records reviewed  Patient's blood sugars were running low  Also she was little anemic  Because of COVID probably she is still feeling tired needs to recover  Will check blood work again CBC CMP for B12 and iron  Advised to hold off diabetic medication if blood sugars are running low  Medications refilled  Home care orders done  Routine follow-up and follow-up as needed if not  feeling better  Will call her with the results     7/19/2024  Patient does have arthritis in the knee  Lidoderm patch  Voltaren gel  Gabapentin to help with the pain  Refills given on ursodiol and glipizide  Blood work reviewed A1c and cholesterol is not done  Will do blood work in 3 months    8/23/2024  EKG done shows normal sinus rhythm chest x-ray done which is unremarkable  UA done which is unremarkable  Patient could be having symptoms from anxiety  Advised to take anxiety medication  Patient has a follow-up with the cardiologist  Advised to go to the emergency room if symptoms gets worse    9/10/2024  Urine analysis shows UTI  Will send it for culture  Keflex for 10 days  Increase water intake  Follow-up if not better    10/19/2024  Hospital chart reviewed again  Stress test normal  Patient's blood pressure is fine  She is taking Norvasc 10 mg a day.  She was on it this dose in the past  She has follow-up with the cardiologist  She got the dye and her creatinine was slightly elevated  Will check her blood work  Diabetes well-controlled  Cholesterol well-controlled  Lipase was elevated  But it came down  Wondering if symptoms were related to her issues with sludge  Clinically patient is doing much better now  Will recheck in 3 months    1/24/2025  Blood work looks very good kidney function improving  Cholesterol well-controlled  Blood pressure stable  Alk phos slightly elevated but patient is asymptomatic  Restart Remeron  Follow-up blood work in 3 months    2/26/2025  EKG done shows normal sinus rhythm  Reviewed them negative  Will order diagnostic mammogram  Patient appears to be quite anxious    3/18/2025  Treat with Z-Baldemar  Steam saline gargles rest fluids  Follow-up if not better    5/20/2025  Medicare wellness exam done  Mini-Mental status examination done  Recall 2 out of 3  Serial 7 perfect spelling backwards perfect  Patient is excellent for her age  Blood work reviewed  Patient has SARA  BMP repeated by  cardiologist and losartan dose reduced in half  Potassium has improved to 5.4 BUN and creatinine is also improving  Patient was taking Azo every day for past few months.  Stop Azo it can cause kidney damage  Increase water intake  Check BMP in 1 week  Ultrasound kidney  Check UA does not show infection but will send it for culture  Refer to nephrologist  Blood sugar okay  Cholesterol okay  Continue current medications  Will do routine blood work this time in 3 months    5/31/2025  Ultrasound kidney shows 1 complex cyst and many benign cyst.  Repeat ultrasound in 6 months  Kidney function is improving nicely  Urine shows UTI  Treat with Ceftin  Continue to increase water intake  Follow-up blood work in 3 months

## 2025-07-16 ENCOUNTER — OFFICE VISIT (OUTPATIENT)
Dept: PRIMARY CARE | Facility: CLINIC | Age: OVER 89
End: 2025-07-16
Payer: MEDICARE

## 2025-07-16 VITALS
SYSTOLIC BLOOD PRESSURE: 126 MMHG | HEIGHT: 62 IN | BODY MASS INDEX: 25.58 KG/M2 | DIASTOLIC BLOOD PRESSURE: 64 MMHG | WEIGHT: 139 LBS

## 2025-07-16 DIAGNOSIS — R30.9 URINARY PAIN: ICD-10-CM

## 2025-07-16 DIAGNOSIS — R10.30 LOWER ABDOMINAL PAIN: Primary | ICD-10-CM

## 2025-07-16 DIAGNOSIS — K59.00 CONSTIPATION, UNSPECIFIED CONSTIPATION TYPE: ICD-10-CM

## 2025-07-16 LAB
POC BILIRUBIN, URINE: NEGATIVE
POC BLOOD, URINE: NEGATIVE
POC GLUCOSE, URINE: NEGATIVE MG/DL
POC KETONES, URINE: NEGATIVE MG/DL
POC LEUKOCYTES, URINE: NEGATIVE
POC NITRITE,URINE: NEGATIVE
POC PH, URINE: 6 PH
POC PROTEIN, URINE: ABNORMAL MG/DL
POC SPECIFIC GRAVITY, URINE: 1.02
POC UROBILINOGEN, URINE: 0.2 EU/DL

## 2025-07-16 PROCEDURE — 99213 OFFICE O/P EST LOW 20 MIN: CPT | Performed by: INTERNAL MEDICINE

## 2025-07-16 PROCEDURE — 3078F DIAST BP <80 MM HG: CPT | Performed by: INTERNAL MEDICINE

## 2025-07-16 PROCEDURE — 1159F MED LIST DOCD IN RCRD: CPT | Performed by: INTERNAL MEDICINE

## 2025-07-16 PROCEDURE — 81003 URINALYSIS AUTO W/O SCOPE: CPT | Performed by: INTERNAL MEDICINE

## 2025-07-16 PROCEDURE — 3074F SYST BP LT 130 MM HG: CPT | Performed by: INTERNAL MEDICINE

## 2025-07-16 PROCEDURE — 1036F TOBACCO NON-USER: CPT | Performed by: INTERNAL MEDICINE

## 2025-07-16 RX ORDER — CIPROFLOXACIN 250 MG/1
250 TABLET, FILM COATED ORAL 2 TIMES DAILY
Qty: 14 TABLET | Refills: 0 | Status: SHIPPED | OUTPATIENT
Start: 2025-07-16 | End: 2025-07-23

## 2025-07-16 RX ORDER — METRONIDAZOLE 500 MG/1
500 TABLET ORAL 3 TIMES DAILY
Qty: 21 TABLET | Refills: 0 | Status: SHIPPED | OUTPATIENT
Start: 2025-07-16 | End: 2025-07-23

## 2025-07-16 NOTE — PROGRESS NOTES
Subjective   Patient ID: TELMA Groves is a 89 y.o. female who presents for Follow-up (Follow up /Feeling of constipation and sore in the lower abdomen).    Patient is here with complaints of having lower abdominal pain.  She is feeling constipated.  She wants antibiotic because that makes her feel better    Past recap  Patient is here for follow-up on blood work follow-up on ultrasound of kidney and follow-up on urine test  Patient is drinking more water  She stopped taking Azo    Past recap   patient is here for Medicare wellness exam  Here for follow-up on blood work  Patient was taking Azo every day for past few months  Last week she stopped when she found her kidney function worse  Overall she is feeling fine otherwise  Follow-up on diabetes hypertension high cholesterol coronary artery disease  Needs medication refill     patient is here with complaints of feeling sick.  Having sinus congestion postnasal drainage chest congestion     Past recap  Patient presents with complaints of having left breast pain.  She is concerned about her heart  Also concerned if she is having UTI    Patient is here for follow-up.  She is overall feeling better.  She is drinking more water.    Wants to go back on Remeron having little anxiety issues  Denies any abdominal pain    Patient is here for hospital follow-up.  Admitted for chest pain and shortness of breath and elevated blood pressure  Her stress test was negative.  Dose of Norvasc increased follow-up elevated blood pressure  Patient is just feeling little headache and he is little anxious  She does not have abdominal pain but she still feels a fullness in the abdomen  Follow-up blood work on diabetes cholesterol  Her knees were hurting in the hospital but they are doing better since she is moving around more    Patient presents with complaints of having urinary burning and frequency and dark urine in the morning for last couple of days.  Denies any fever or  chills  Denies any back pain, feeling tired    Patient is here with complaints of having shoulder pain back pain generalized achiness not feeling good.  Not sure if she is having anxiety or having something wrong     patient is here for follow-up  Follow-up on hypertension high cholesterol diabetes coronary artery disease  Needs refills  Last week right knee started hurting    Past recap   patient is here for hospital follow-up and rehab follow-up.  She was admitted for COVID and dehydration  She is still feeling tired and has no ambition  Home care is still active and she is getting therapy   patient is here because she continues to have pain and funny sensation in the left lower quadrant   Patient is wants her kidneys checked she is worried about the kidneys.  She is also having pain in the right knee having discomfort and having discomfort when walking  She got nerve block from Dr. Quesada for lumbar radiculopathy      Past recap  patient is here for routine follow-up  Her abdominal pain is doing better  She went to the GI and had colonoscopy done which was unremarkable.  Her pain went away by itself   Needs medication refill  Follow-up on hypertension diabetes high cholesterol did blood work     patient is here still having abdominal bloating still having left lower quadrant discomfort and very worried that something is wrong   She is drinking enough water  If you are in the morning but during the day does not pee as much     patient is here because she is still having off-and-on abdominal pain and feels very distended not having good bowel movement in spite of taking lactulose     Patient is here with complaints of having abdominal pain.  Last Wednesday went to the ER urine grew Klebsiella pneumonia treated for UTI  2 weeks ago was very constipated stomach was hurting all over no still gets distended and not having good bowel movement  She is on gabapentin by Dr. quesada  Her back also hurts quite a bit       patient is here for follow-up  Follow-up on coronary artery disease hypertension high cholesterol anxiety  Did blood work  Needs medications  Patient got II nerve blocks back pain is doing little better but she has really suffered from the back pain.  Steroid only helped temporarily      Blood pressure  Needs medication refill  Shingles pain is doing better  Follow-up on coronary artery disease hypertension high cholesterol  Anxiety is doing better sometimes she takes tramadol  Wants nausea medication for occasional use  Wants handicap sticker  Shingles vaccine she is due  Overall doing well        Patient is complaining of having right upper quadrant pain and soreness having loose stools cholestyramine is not agreeing  She is worried that her GI issues are flaring up  She has history of common bile duct stones and irritable bowel disease  She also has history of diverticulitis C. difficile colitis     patient went to see the GI for the abdominal discomfort she was having she had MRI done CAT scans and ultrasound done everything came back normal  She did blood work for cholesterol diabetes  Here for follow-up and needs refills on medications  Overall she is doing great  Wants prescription for Zofran for occasional use      patient fell and broke her nose has a lot of bruises. On Wednesday she was walking in the parking lot to get groceries and she fell on her face  Now she is having pain in the right elbow she did not get x-ray of the elbow done concerned if she has broken the bone  She has sutures on the chin     Patient here for follow-up on hospital stay  Was admitted for TIA symptoms started on Plavix  Her blood pressure was very high last evening she is concerned if Plavix is making her blood pressure goal  She called me early morning hours around 5 spoke to her and after that she felt comfortable and slept  blood pressure is better now   She is also concerned about findings on the mastoid in MRI  Still feels  "sinuses to be congested       Objective   /64   Ht 1.575 m (5' 2\")   Wt 63 kg (139 lb)   BMI 25.42 kg/m²     Physical Exam  Vitals reviewed.   Constitutional:       Appearance: Normal appearance.   HENT:      Head: Normocephalic and atraumatic.      Right Ear: Tympanic membrane, ear canal and external ear normal.      Left Ear: Tympanic membrane, ear canal and external ear normal.      Nose: Congestion and rhinorrhea present.      Mouth/Throat:      Pharynx: Oropharynx is clear.     Eyes:      Extraocular Movements: Extraocular movements intact.      Conjunctiva/sclera: Conjunctivae normal.      Pupils: Pupils are equal, round, and reactive to light.       Cardiovascular:      Rate and Rhythm: Normal rate and regular rhythm.      Pulses: Normal pulses.      Heart sounds: Normal heart sounds.   Pulmonary:      Effort: Pulmonary effort is normal.      Breath sounds: Normal breath sounds.   Abdominal:      General: Abdomen is flat. Bowel sounds are normal.      Palpations: Abdomen is soft.     Musculoskeletal:      Cervical back: Normal range of motion and neck supple.     Skin:     General: Skin is warm and dry.     Neurological:      General: No focal deficit present.      Mental Status: She is alert and oriented to person, place, and time.     Psychiatric:         Mood and Affect: Mood normal.         Assessment/Plan   Problem List Items Addressed This Visit          Gastrointestinal and Abdominal    Abdominal pain - Primary    Constipation     Other Visit Diagnoses         Urinary pain        Relevant Medications    metroNIDAZOLE (Flagyl) 500 mg tablet    ciprofloxacin (Cipro) 250 mg tablet    Other Relevant Orders    POCT UA Automated manually resulted (Completed)    Urine Culture              past recap  Patient symptoms could be related to gastroparesis  Patient quit taking Reglan  We we will try Reglan again  Try sucralfate to help with the acid reflux  She needs Plavix for her coronary artery disease " and I do not think symptoms are caused by Plavix  Increase fiber in the diet  Follow-up if not better     10/21/22   Blood work results reviewed  Cholesterol well controlled  Sugar well controlled  Blood pressure stable  Patient had follow-up with a cardiologist coronary artery disease stable  Advised patient to take Metamucil to help with the loose stool  Explained that not safe to give standing order for antibiotics  Refer to physical therapy for lower back pain and hip pain  Medications refilled  Follow-up in 6 months     4/21/2023  Blood pressure stable  Creatinine has gone up to 1.69 BUN 42  Encourage patient to drink more water  Cut down salt intake if not better will do ultrasound kidneys and recheck blood work in 3 months  A1c has gone up to 7.6 patient has been really well controlled on diabetes all along  Steroids must be doing it  We will recheck and see if needs medication adjustment  Patient wants to wait until next blood work  Cholesterol is okay  Follow-up blood work in 3 months     7/31/2023  ER records reviewed  BUN 23 creatinine 1.5 potassium 5.1  Last blood work here showed elevated creatinine and potassium was high but at that time patient says she was getting nerve blocks which made her kidneys work well  We will check UA C&S  Try lactulose for constipation  Medrol Dosepak for the back pain  Follow-up if not better     8/7/2023  Patient has distended abdomen  Stat CT of the abdomen pelvis done without contrast because of compromised kidney function  No acute pathology found  Advised patient to take lactulose twice a day or 3 times a day to see if it helps to improve and regularize her bowel movements  Follow-up if not better            8/24/2023  All blood work has been negative  CAT scan has been negative  We will do the ultrasound of the abdomen to make sure there is no urinary retention causing the symptoms  Refer to GI if ultrasound is negative    10/20/2023  Blood pressure  stable  Cholesterol well controlled  Diabetes under control  Ursodiol given for bile duct/  Coronary artery disease stable patient to care of cardiologist  Medications refilled  Follow-up in 6 months      3/29/2024  Hospital records reviewed  Patient's blood sugars were running low  Also she was little anemic  Because of COVID probably she is still feeling tired needs to recover  Will check blood work again CBC CMP for B12 and iron  Advised to hold off diabetic medication if blood sugars are running low  Medications refilled  Home care orders done  Routine follow-up and follow-up as needed if not feeling better  Will call her with the results     7/19/2024  Patient does have arthritis in the knee  Lidoderm patch  Voltaren gel  Gabapentin to help with the pain  Refills given on ursodiol and glipizide  Blood work reviewed A1c and cholesterol is not done  Will do blood work in 3 months    8/23/2024  EKG done shows normal sinus rhythm chest x-ray done which is unremarkable  UA done which is unremarkable  Patient could be having symptoms from anxiety  Advised to take anxiety medication  Patient has a follow-up with the cardiologist  Advised to go to the emergency room if symptoms gets worse    9/10/2024  Urine analysis shows UTI  Will send it for culture  Keflex for 10 days  Increase water intake  Follow-up if not better    10/19/2024  Hospital chart reviewed again  Stress test normal  Patient's blood pressure is fine  She is taking Norvasc 10 mg a day.  She was on it this dose in the past  She has follow-up with the cardiologist  She got the dye and her creatinine was slightly elevated  Will check her blood work  Diabetes well-controlled  Cholesterol well-controlled  Lipase was elevated  But it came down  Wondering if symptoms were related to her issues with sludge  Clinically patient is doing much better now  Will recheck in 3 months    1/24/2025  Blood work looks very good kidney function improving  Cholesterol  well-controlled  Blood pressure stable  Alk phos slightly elevated but patient is asymptomatic  Restart Remeron  Follow-up blood work in 3 months    2/26/2025  EKG done shows normal sinus rhythm  Reviewed them negative  Will order diagnostic mammogram  Patient appears to be quite anxious    3/18/2025  Treat with Z-Baldemar  Steam saline gargles rest fluids  Follow-up if not better    5/20/2025  Medicare wellness exam done  Mini-Mental status examination done  Recall 2 out of 3  Serial 7 perfect spelling backwards perfect  Patient is excellent for her age  Blood work reviewed  Patient has SARA  BMP repeated by cardiologist and losartan dose reduced in half  Potassium has improved to 5.4 BUN and creatinine is also improving  Patient was taking Azo every day for past few months.  Stop Azo it can cause kidney damage  Increase water intake  Check BMP in 1 week  Ultrasound kidney  Check UA does not show infection but will send it for culture  Refer to nephrologist  Blood sugar okay  Cholesterol okay  Continue current medications  Will do routine blood work this time in 3 months    5/31/2025  Ultrasound kidney shows 1 complex cyst and many benign cyst.  Repeat ultrasound in 6 months  Kidney function is improving nicely  Urine shows UTI  Treat with Ceftin  Continue to increase water intake  Follow-up blood work in 3 months    7/16/2025  Urinalysis done does not show infection  Advised patient to take lactulose first.  If it relieves the constipation and pain no need for antibiotic  Prescription for Cipro and Flagyl given in case pains persist

## 2025-07-18 LAB — BACTERIA UR CULT: NORMAL

## 2025-08-25 DIAGNOSIS — M25.562 ACUTE PAIN OF BOTH KNEES: ICD-10-CM

## 2025-08-25 DIAGNOSIS — M25.561 ACUTE PAIN OF BOTH KNEES: ICD-10-CM

## 2025-08-25 RX ORDER — GABAPENTIN 300 MG/1
300 CAPSULE ORAL 2 TIMES DAILY
Qty: 180 CAPSULE | Refills: 0 | Status: SHIPPED | OUTPATIENT
Start: 2025-08-25

## 2025-09-13 ENCOUNTER — APPOINTMENT (OUTPATIENT)
Dept: PRIMARY CARE | Facility: CLINIC | Age: OVER 89
End: 2025-09-13
Payer: MEDICARE

## 2025-10-21 ENCOUNTER — APPOINTMENT (OUTPATIENT)
Dept: OPHTHALMOLOGY | Facility: CLINIC | Age: OVER 89
End: 2025-10-21
Payer: MEDICARE